# Patient Record
Sex: MALE | Race: WHITE | NOT HISPANIC OR LATINO | Employment: OTHER | URBAN - METROPOLITAN AREA
[De-identification: names, ages, dates, MRNs, and addresses within clinical notes are randomized per-mention and may not be internally consistent; named-entity substitution may affect disease eponyms.]

---

## 2017-05-31 ENCOUNTER — TELEPHONE (OUTPATIENT)
Dept: ORTHOPEDICS | Facility: CLINIC | Age: 72
End: 2017-05-31

## 2017-05-31 NOTE — TELEPHONE ENCOUNTER
----- Message from Anh Nelson sent at 5/31/2017  3:13 PM CDT -----  Contact: self  Patient states it is time for his Orthovisc injections and would like for you to order then call to make appt

## 2017-06-26 ENCOUNTER — OFFICE VISIT (OUTPATIENT)
Dept: ORTHOPEDICS | Facility: CLINIC | Age: 72
End: 2017-06-26
Payer: MEDICARE

## 2017-06-26 DIAGNOSIS — M17.0 PRIMARY OSTEOARTHRITIS OF BOTH KNEES: Primary | ICD-10-CM

## 2017-06-26 PROCEDURE — 99212 OFFICE O/P EST SF 10 MIN: CPT | Mod: PBBFAC,PN,25 | Performed by: ORTHOPAEDIC SURGERY

## 2017-06-26 PROCEDURE — 20610 DRAIN/INJ JOINT/BURSA W/O US: CPT | Mod: 50,PBBFAC,PN | Performed by: ORTHOPAEDIC SURGERY

## 2017-06-26 PROCEDURE — 99499 UNLISTED E&M SERVICE: CPT | Mod: S$PBB,,, | Performed by: ORTHOPAEDIC SURGERY

## 2017-06-26 PROCEDURE — 99999 PR PBB SHADOW E&M-EST. PATIENT-LVL II: CPT | Mod: PBBFAC,,, | Performed by: ORTHOPAEDIC SURGERY

## 2017-06-26 RX ADMIN — Medication 30 MG: at 10:06

## 2017-06-26 NOTE — PROCEDURES
Large Joint Aspiration/Injection  Date/Time: 6/26/2017 10:27 AM  Performed by: YUMIKO UGALDE  Authorized by: YUMIKO UGALDE     Consent Done?:  Yes (Verbal)  Indications:  Pain  Procedure site marked: Yes    Timeout: Prior to procedure the correct patient, procedure, and site was verified      Location:  Knee  Site:  R knee and L knee  Prep: Patient was prepped and draped in usual sterile fashion    Needle size:  20 G  Approach:  Anterolateral  Medications:  30 mg sodium hyaluronate (viscosup) 30 mg/2 mL; 30 mg sodium hyaluronate (viscosup) 30 mg/2 mL  Patient tolerance:  Patient tolerated the procedure well with no immediate complications

## 2017-06-26 NOTE — PROGRESS NOTES
This note was created using Dragon dictation software. The program will occasionally misinterpret certain words or phrases.   Past medical history, surgical history, medications, allergies, family history and social history are all reviewed and in their proper sections.     Chief complaint: Bilateral knee pain    History: This is a 71-year-old male with a long history of bilateral knee pain.  Patient has had periods treatment options in the past.  He had a Synvisc series, but had a pseudo-septic reaction.  He also had an Orthovisc series with good success. He rates the pain as a 0/10.  Left is mildly worse than the right.  Symptoms are worsening and moderate.  Pain is worse with walking running or prolonged sitting    Present:  Here to start a new round of Orthovisc.    Physical examination:   Vital signs are entered in their proper section.   This is a well-developed, well-nourished patient in no acute distress. They are alert and oriented and cooperative to examination. Pt walks without an antalgic gait.     Examination of bilateral knees shows no rashes or erythema. There are no masses ecchymosis or effusion. Patient has full range of motion from 0-140°. Patient is nontender to palpation over lateral joint line and moderately tender to palpation over the medial joint line.  Knee is stable to varus and valgus stress. 5 out of 5 motor strength. Palpable distal pulses. Intact light touch sensation.  Mild Patellofemoral crepitus.  Mild varus knee deformity.    X-rays: 4 views of bilateral knees are reviewed which show severe medial joint space narrowing of the left and moderate to severe medial joint space narrowing on the right with patellofemoral spurring bilaterally    Assessment: Bilateral knee arthritis    Plan: Injected both knees with Orthovisc 1 of 3.  Follow-up in one week to continue.

## 2017-07-06 ENCOUNTER — OFFICE VISIT (OUTPATIENT)
Dept: ORTHOPEDICS | Facility: CLINIC | Age: 72
End: 2017-07-06
Payer: MEDICARE

## 2017-07-06 DIAGNOSIS — M17.0 PRIMARY OSTEOARTHRITIS OF BOTH KNEES: Primary | ICD-10-CM

## 2017-07-06 PROCEDURE — 99212 OFFICE O/P EST SF 10 MIN: CPT | Mod: PBBFAC,PN | Performed by: ORTHOPAEDIC SURGERY

## 2017-07-06 PROCEDURE — 99499 UNLISTED E&M SERVICE: CPT | Mod: S$PBB,,, | Performed by: ORTHOPAEDIC SURGERY

## 2017-07-06 PROCEDURE — 99999 PR PBB SHADOW E&M-EST. PATIENT-LVL II: CPT | Mod: PBBFAC,,, | Performed by: ORTHOPAEDIC SURGERY

## 2017-07-06 PROCEDURE — 20610 DRAIN/INJ JOINT/BURSA W/O US: CPT | Mod: 50,PBBFAC,PN | Performed by: ORTHOPAEDIC SURGERY

## 2017-07-06 RX ADMIN — Medication 30 MG: at 02:07

## 2017-07-06 NOTE — PROCEDURES
Large Joint Aspiration/Injection  Date/Time: 7/6/2017 2:23 PM  Performed by: YUMIKO UGALDE  Authorized by: YUMIKO GUALDE     Consent Done?:  Yes (Verbal)  Indications:  Pain  Procedure site marked: Yes    Timeout: Prior to procedure the correct patient, procedure, and site was verified      Location:  Knee  Site:  R knee and L knee  Prep: Patient was prepped and draped in usual sterile fashion    Needle size:  20 G  Approach:  Anterolateral  Medications:  30 mg sodium hyaluronate (viscosup) 30 mg/2 mL; 30 mg sodium hyaluronate (viscosup) 30 mg/2 mL  Patient tolerance:  Patient tolerated the procedure well with no immediate complications

## 2017-07-06 NOTE — PROGRESS NOTES
This note was created using Dragon dictation software. The program will occasionally misinterpret certain words or phrases.   Past medical history, surgical history, medications, allergies, family history and social history are all reviewed and in their proper sections.     Chief complaint: Bilateral knee pain    History: This is a 71-year-old male with a long history of bilateral knee pain.  Patient has had periods treatment options in the past.  He had a Synvisc series, but had a pseudo-septic reaction.  He also had an Orthovisc series with good success. He rates the pain as a 0/10.  Left is mildly worse than the right.  Symptoms are worsening and moderate.  Pain is worse with walking running or prolonged sitting    Present:  Here to get Orthovisc.    Physical examination:   Vital signs are entered in their proper section.   This is a well-developed, well-nourished patient in no acute distress. They are alert and oriented and cooperative to examination. Pt walks without an antalgic gait.     Examination of bilateral knees shows no rashes or erythema. There are no masses ecchymosis or effusion. Patient has full range of motion from 0-140°. Patient is nontender to palpation over lateral joint line and moderately tender to palpation over the medial joint line.  Knee is stable to varus and valgus stress. 5 out of 5 motor strength. Palpable distal pulses. Intact light touch sensation.  Mild Patellofemoral crepitus.  Mild varus knee deformity.    X-rays: 4 views of bilateral knees are reviewed which show severe medial joint space narrowing of the left and moderate to severe medial joint space narrowing on the right with patellofemoral spurring bilaterally    Assessment: Bilateral knee arthritis    Plan: Injected both knees with Orthovisc 2 of 3.  Follow-up in one week to continue.

## 2017-07-10 ENCOUNTER — TELEPHONE (OUTPATIENT)
Dept: ORTHOPEDICS | Facility: CLINIC | Age: 72
End: 2017-07-10

## 2017-07-10 NOTE — TELEPHONE ENCOUNTER
----- Message from Roxanna Chandler sent at 7/10/2017  8:11 AM CDT -----  Contact: Manpreet  Patient needs to reschedule injection today. His pump is out on his steering wheel. Would like to move to Thursday July 13 around 3 pm. Call cell [phone] thanks!

## 2017-07-13 ENCOUNTER — OFFICE VISIT (OUTPATIENT)
Dept: ORTHOPEDICS | Facility: CLINIC | Age: 72
End: 2017-07-13
Payer: MEDICARE

## 2017-07-13 DIAGNOSIS — M17.0 PRIMARY OSTEOARTHRITIS OF BOTH KNEES: Primary | ICD-10-CM

## 2017-07-13 PROCEDURE — 99499 UNLISTED E&M SERVICE: CPT | Mod: S$PBB,,, | Performed by: ORTHOPAEDIC SURGERY

## 2017-07-13 PROCEDURE — 99999 PR PBB SHADOW E&M-EST. PATIENT-LVL II: CPT | Mod: PBBFAC,,, | Performed by: ORTHOPAEDIC SURGERY

## 2017-07-13 PROCEDURE — 20610 DRAIN/INJ JOINT/BURSA W/O US: CPT | Mod: 50,PBBFAC,PN | Performed by: ORTHOPAEDIC SURGERY

## 2017-07-13 PROCEDURE — 99212 OFFICE O/P EST SF 10 MIN: CPT | Mod: PBBFAC,PN,25 | Performed by: ORTHOPAEDIC SURGERY

## 2017-07-13 RX ADMIN — Medication 30 MG: at 03:07

## 2017-07-15 NOTE — PROCEDURES
Large Joint Aspiration/Injection  Date/Time: 7/13/2017 3:30 PM  Performed by: YUMIKO UGALDE  Authorized by: YUMIKO UGALDE     Consent Done?:  Yes (Verbal)  Indications:  Pain  Procedure site marked: Yes    Timeout: Prior to procedure the correct patient, procedure, and site was verified      Location:  Knee  Site:  R knee and L knee  Prep: Patient was prepped and draped in usual sterile fashion    Needle size:  20 G  Approach:  Anterolateral  Medications:  30 mg sodium hyaluronate (viscosup) 30 mg/2 mL; 30 mg sodium hyaluronate (viscosup) 30 mg/2 mL  Patient tolerance:  Patient tolerated the procedure well with no immediate complications

## 2017-07-15 NOTE — PROGRESS NOTES
This note was created using Dragon dictation software. The program will occasionally misinterpret certain words or phrases.   Past medical history, surgical history, medications, allergies, family history and social history are all reviewed and in their proper sections.     Chief complaint: Bilateral knee pain    History: This is a 71-year-old male with a long history of bilateral knee pain.  Patient has had periods treatment options in the past.  He had a Synvisc series, but had a pseudo-septic reaction.  He also had an Orthovisc series with good success. He rates the pain as a 0/10.  Left is mildly worse than the right.  Symptoms are worsening and moderate.  Pain is worse with walking running or prolonged sitting    Present:  Here to get Orthovisc.    Physical examination:   Vital signs are entered in their proper section.   This is a well-developed, well-nourished patient in no acute distress. They are alert and oriented and cooperative to examination. Pt walks without an antalgic gait.     Examination of bilateral knees shows no rashes or erythema. There are no masses ecchymosis or effusion. Patient has full range of motion from 0-140°. Patient is nontender to palpation over lateral joint line and moderately tender to palpation over the medial joint line.  Knee is stable to varus and valgus stress. 5 out of 5 motor strength. Palpable distal pulses. Intact light touch sensation.  Mild Patellofemoral crepitus.  Mild varus knee deformity.    X-rays: 4 views of bilateral knees are reviewed which show severe medial joint space narrowing of the left and moderate to severe medial joint space narrowing on the right with patellofemoral spurring bilaterally    Assessment: Bilateral knee arthritis    Plan: Injected both knees with Orthovisc 3 of 3.  Follow-up in 6 months.

## 2018-01-08 ENCOUNTER — TELEPHONE (OUTPATIENT)
Dept: ORTHOPEDICS | Facility: CLINIC | Age: 73
End: 2018-01-08

## 2018-01-08 NOTE — TELEPHONE ENCOUNTER
Called and advised patient that I will get the Orthovisc ordered for him and call him once received. He verbalized understanding.

## 2018-01-08 NOTE — TELEPHONE ENCOUNTER
----- Message from Shayy Mclain sent at 1/8/2018  4:13 PM CST -----  Contact: Self/[phone]  Pt is requesting a call back re: ordering the Orthovisc.

## 2018-01-29 ENCOUNTER — OFFICE VISIT (OUTPATIENT)
Dept: ORTHOPEDICS | Facility: CLINIC | Age: 73
End: 2018-01-29
Payer: MEDICARE

## 2018-01-29 DIAGNOSIS — M17.0 PRIMARY OSTEOARTHRITIS OF BOTH KNEES: Primary | ICD-10-CM

## 2018-01-29 PROCEDURE — 99499 UNLISTED E&M SERVICE: CPT | Mod: S$PBB,,, | Performed by: ORTHOPAEDIC SURGERY

## 2018-01-29 PROCEDURE — 99999 PR PBB SHADOW E&M-EST. PATIENT-LVL II: CPT | Mod: PBBFAC,,, | Performed by: ORTHOPAEDIC SURGERY

## 2018-01-29 PROCEDURE — 20610 DRAIN/INJ JOINT/BURSA W/O US: CPT | Mod: 50,PBBFAC,PN | Performed by: ORTHOPAEDIC SURGERY

## 2018-01-29 PROCEDURE — 99212 OFFICE O/P EST SF 10 MIN: CPT | Mod: PBBFAC,PN | Performed by: ORTHOPAEDIC SURGERY

## 2018-01-29 RX ADMIN — Medication 30 MG: at 09:01

## 2018-01-29 NOTE — PROCEDURES
Large Joint Aspiration/Injection  Date/Time: 1/29/2018 9:06 AM  Performed by: YUMIKO UGALDE  Authorized by: YUMIKO UGALDE     Consent Done?:  Yes (Verbal)  Indications:  Pain  Procedure site marked: Yes    Timeout: Prior to procedure the correct patient, procedure, and site was verified      Location:  Knee  Site:  R knee and L knee  Prep: Patient was prepped and draped in usual sterile fashion    Needle size:  20 G  Approach:  Anterolateral  Medications:  30 mg sodium hyaluronate (viscosup) 30 mg/2 mL; 30 mg sodium hyaluronate (viscosup) 30 mg/2 mL  Patient tolerance:  Patient tolerated the procedure well with no immediate complications

## 2018-01-29 NOTE — PROGRESS NOTES
This note was created using Dragon dictation software. The program will occasionally misinterpret certain words or phrases.   Past medical history, surgical history, medications, allergies, family history and social history are all reviewed and in their proper sections.     Chief complaint: Bilateral knee pain    History: This is a 71-year-old male with a long history of bilateral knee pain.  Patient has had periods treatment options in the past.  He had a Synvisc series, but had a pseudo-septic reaction.  He also had an Orthovisc series with good success. He rates the pain as a 0/10.  Left is mildly worse than the right.  Symptoms are worsening and moderate.  Pain is worse with walking running or prolonged sitting    Present:  Here to get Orthovisc.    Physical examination:   Vital signs are entered in their proper section.   This is a well-developed, well-nourished patient in no acute distress. They are alert and oriented and cooperative to examination. Pt walks without an antalgic gait.     Examination of bilateral knees shows no rashes or erythema. There are no masses ecchymosis or effusion. Patient has full range of motion from 0-140°. Patient is nontender to palpation over lateral joint line and moderately tender to palpation over the medial joint line.  Knee is stable to varus and valgus stress. 5 out of 5 motor strength. Palpable distal pulses. Intact light touch sensation.  Mild Patellofemoral crepitus.  Mild varus knee deformity.    X-rays: 4 views of bilateral knees are reviewed which show severe medial joint space narrowing of the left and moderate to severe medial joint space narrowing on the right with patellofemoral spurring bilaterally    Assessment: Bilateral knee arthritis    Plan: Injected both knees with Orthovisc 1 of 3.  Follow-up in one week.

## 2018-02-05 ENCOUNTER — OFFICE VISIT (OUTPATIENT)
Dept: ORTHOPEDICS | Facility: CLINIC | Age: 73
End: 2018-02-05
Payer: MEDICARE

## 2018-02-05 DIAGNOSIS — M17.0 PRIMARY OSTEOARTHRITIS OF BOTH KNEES: Primary | ICD-10-CM

## 2018-02-05 PROCEDURE — 99212 OFFICE O/P EST SF 10 MIN: CPT | Mod: PBBFAC,PN,25 | Performed by: ORTHOPAEDIC SURGERY

## 2018-02-05 PROCEDURE — 99499 UNLISTED E&M SERVICE: CPT | Mod: S$PBB,,, | Performed by: ORTHOPAEDIC SURGERY

## 2018-02-05 PROCEDURE — 20610 DRAIN/INJ JOINT/BURSA W/O US: CPT | Mod: 50,PBBFAC,PN | Performed by: ORTHOPAEDIC SURGERY

## 2018-02-05 PROCEDURE — 99999 PR PBB SHADOW E&M-EST. PATIENT-LVL II: CPT | Mod: PBBFAC,,, | Performed by: ORTHOPAEDIC SURGERY

## 2018-02-05 RX ADMIN — Medication 30 MG: at 12:02

## 2018-02-05 NOTE — PROCEDURES
Large Joint Aspiration/Injection  Date/Time: 2/5/2018 12:15 PM  Performed by: YUMIKO UGALDE  Authorized by: YUMIKO UGALDE     Consent Done?:  Yes (Verbal)  Indications:  Pain  Procedure site marked: Yes    Timeout: Prior to procedure the correct patient, procedure, and site was verified      Location:  Knee  Site:  R knee and L knee  Prep: Patient was prepped and draped in usual sterile fashion    Needle size:  20 G  Approach:  Anterolateral  Medications:  30 mg sodium hyaluronate (viscosup) 30 mg/2 mL; 30 mg sodium hyaluronate (viscosup) 30 mg/2 mL  Patient tolerance:  Patient tolerated the procedure well with no immediate complications

## 2018-02-12 ENCOUNTER — OFFICE VISIT (OUTPATIENT)
Dept: ORTHOPEDICS | Facility: CLINIC | Age: 73
End: 2018-02-12
Payer: MEDICARE

## 2018-02-12 DIAGNOSIS — M17.0 PRIMARY OSTEOARTHRITIS OF BOTH KNEES: Primary | ICD-10-CM

## 2018-02-12 PROCEDURE — 99499 UNLISTED E&M SERVICE: CPT | Mod: S$PBB,,, | Performed by: ORTHOPAEDIC SURGERY

## 2018-02-12 PROCEDURE — 99999 PR PBB SHADOW E&M-EST. PATIENT-LVL II: CPT | Mod: PBBFAC,,, | Performed by: ORTHOPAEDIC SURGERY

## 2018-02-12 PROCEDURE — 20610 DRAIN/INJ JOINT/BURSA W/O US: CPT | Mod: 50,PBBFAC,PN | Performed by: ORTHOPAEDIC SURGERY

## 2018-02-12 PROCEDURE — 99212 OFFICE O/P EST SF 10 MIN: CPT | Mod: PBBFAC,PN | Performed by: ORTHOPAEDIC SURGERY

## 2018-02-12 RX ADMIN — Medication 30 MG: at 08:02

## 2018-02-12 NOTE — PROGRESS NOTES
This note was created using Dragon dictation software. The program will occasionally misinterpret certain words or phrases.   Past medical history, surgical history, medications, allergies, family history and social history are all reviewed and in their proper sections.     Chief complaint: Bilateral knee pain    History: This is a 72-year-old male with a long history of bilateral knee pain.  Patient has had periods treatment options in the past.  He had a Synvisc series, but had a pseudo-septic reaction.  He also had an Orthovisc series with good success. He rates the pain as a 0/10.  Left is mildly worse than the right.  Symptoms are worsening and moderate.  Pain is worse with walking running or prolonged sitting    Present:  Here to get Orthovisc.    Physical examination:   Vital signs are entered in their proper section.   This is a well-developed, well-nourished patient in no acute distress. They are alert and oriented and cooperative to examination. Pt walks without an antalgic gait.     Examination of bilateral knees shows no rashes or erythema. There are no masses ecchymosis or effusion. Patient has full range of motion from 0-140°. Patient is nontender to palpation over lateral joint line and moderately tender to palpation over the medial joint line.  Knee is stable to varus and valgus stress. 5 out of 5 motor strength. Palpable distal pulses. Intact light touch sensation.  Mild Patellofemoral crepitus.  Mild varus knee deformity.    X-rays: 4 views of bilateral knees are reviewed which show severe medial joint space narrowing of the left and moderate to severe medial joint space narrowing on the right with patellofemoral spurring bilaterally    Assessment: Bilateral knee arthritis    Plan: Injected both knees with Orthovisc 3  of 3.  Follow-up in 6 months

## 2018-02-12 NOTE — PROCEDURES
Large Joint Aspiration/Injection  Date/Time: 2/12/2018 8:44 AM  Performed by: YUMIKO UGALDE  Authorized by: YUMIKO UGALDE     Consent Done?:  Yes (Verbal)  Indications:  Pain  Procedure site marked: Yes    Timeout: Prior to procedure the correct patient, procedure, and site was verified      Location:  Knee  Site:  L knee and R knee  Prep: Patient was prepped and draped in usual sterile fashion    Needle size:  20 G  Approach:  Anterolateral  Medications:  30 mg sodium hyaluronate (viscosup) 30 mg/2 mL; 30 mg sodium hyaluronate (viscosup) 30 mg/2 mL  Patient tolerance:  Patient tolerated the procedure well with no immediate complications

## 2018-07-20 ENCOUNTER — TELEPHONE (OUTPATIENT)
Dept: ORTHOPEDICS | Facility: CLINIC | Age: 73
End: 2018-07-20

## 2018-07-20 NOTE — TELEPHONE ENCOUNTER
Called pt and advised we do have injections in clinic ready for his appt on 8/13/18. Pt verbalized understanding.

## 2018-07-20 NOTE — TELEPHONE ENCOUNTER
----- Message from Rose Donnelly MA sent at 7/20/2018 11:53 AM CDT -----  Contact: Self  Patient wants to make sure his orthovisc is ordered for his August 13th appointment

## 2018-07-24 DIAGNOSIS — M17.0 BILATERAL PRIMARY OSTEOARTHRITIS OF KNEE: Primary | ICD-10-CM

## 2018-08-13 ENCOUNTER — OFFICE VISIT (OUTPATIENT)
Dept: ORTHOPEDICS | Facility: CLINIC | Age: 73
End: 2018-08-13
Payer: MEDICARE

## 2018-08-13 DIAGNOSIS — M17.0 PRIMARY OSTEOARTHRITIS OF BOTH KNEES: Primary | ICD-10-CM

## 2018-08-13 PROCEDURE — 99999 PR PBB SHADOW E&M-EST. PATIENT-LVL III: CPT | Mod: PBBFAC,,, | Performed by: ORTHOPAEDIC SURGERY

## 2018-08-13 PROCEDURE — 99213 OFFICE O/P EST LOW 20 MIN: CPT | Mod: 25,S$PBB,, | Performed by: ORTHOPAEDIC SURGERY

## 2018-08-13 PROCEDURE — 99213 OFFICE O/P EST LOW 20 MIN: CPT | Mod: PBBFAC,PN,25 | Performed by: ORTHOPAEDIC SURGERY

## 2018-08-13 PROCEDURE — 20610 DRAIN/INJ JOINT/BURSA W/O US: CPT | Mod: 50,PBBFAC,PN | Performed by: ORTHOPAEDIC SURGERY

## 2018-08-13 RX ADMIN — Medication 15 MG: at 08:08

## 2018-08-13 NOTE — PROCEDURES
Large Joint Aspiration/Injection: R knee, L knee  Date/Time: 8/13/2018 8:13 AM  Performed by: Max Garcia MD  Authorized by: Max Garcia MD     Consent Done?:  Yes (Verbal)  Indications:  Pain  Procedure site marked: Yes    Timeout: Prior to procedure the correct patient, procedure, and site was verified      Location:  Knee  Site:  R knee and L knee  Prep: Patient was prepped and draped in usual sterile fashion    Needle size:  20 G  Approach:  Anterolateral  Medications:  15 mg sodium hyaluronate (orthovisc) 30 mg/2 mL; 15 mg sodium hyaluronate (orthovisc) 30 mg/2 mL  Patient tolerance:  Patient tolerated the procedure well with no immediate complications

## 2018-08-13 NOTE — PROGRESS NOTES
This note was created using Dragon dictation software. The program will occasionally misinterpret certain words or phrases.   Past medical history, surgical history, medications, allergies, family history and social history are all reviewed and in their proper sections.     Chief complaint: Bilateral knee pain    History: This is a 72-year-old male with a long history of bilateral knee pain.  Patient has had periods treatment options in the past.  He had a Synvisc series, but had a pseudo-septic reaction.  He also had an Orthovisc series with good success. He rates the pain as a 0/10.  Left is mildly worse than the right.  Symptoms are worsening and moderate.  Pain is worse with walking running or prolonged sitting    Present:  Here to get Orthovisc.  Each series seems to lasts less than last.  Only got about 5 months from the last 1.  Rates his pain today is a 2/10.    Physical examination:   Vital signs are entered in their proper section.   This is a well-developed, well-nourished patient in no acute distress. They are alert and oriented and cooperative to examination. Pt walks without an antalgic gait.     Examination of bilateral knees shows no rashes or erythema. There are no masses ecchymosis or effusion. Patient has full range of motion from 0-140°. Patient is nontender to palpation over lateral joint line and moderately tender to palpation over the medial joint line.  Knee is stable to varus and valgus stress. 5 out of 5 motor strength. Palpable distal pulses. Intact light touch sensation.  Mild Patellofemoral crepitus.  Mild varus knee deformity.    X-rays: 4 views of bilateral knees are reviewed which show severe medial joint space narrowing of the left and moderate to severe medial joint space narrowing on the right with patellofemoral spurring bilaterally    Assessment: Bilateral knee arthritis    Plan: Injected both knees with Orthovisc 1  of 3.  Follow-up next week.

## 2018-08-20 ENCOUNTER — OFFICE VISIT (OUTPATIENT)
Dept: ORTHOPEDICS | Facility: CLINIC | Age: 73
End: 2018-08-20
Payer: MEDICARE

## 2018-08-20 VITALS — WEIGHT: 241 LBS | BODY MASS INDEX: 34.5 KG/M2 | HEIGHT: 70 IN

## 2018-08-20 DIAGNOSIS — M17.0 PRIMARY OSTEOARTHRITIS OF BOTH KNEES: Primary | ICD-10-CM

## 2018-08-20 PROCEDURE — 99999 PR PBB SHADOW E&M-EST. PATIENT-LVL II: CPT | Mod: PBBFAC,,, | Performed by: ORTHOPAEDIC SURGERY

## 2018-08-20 PROCEDURE — 99212 OFFICE O/P EST SF 10 MIN: CPT | Mod: PBBFAC,PN | Performed by: ORTHOPAEDIC SURGERY

## 2018-08-20 PROCEDURE — 99499 UNLISTED E&M SERVICE: CPT | Mod: S$PBB,,, | Performed by: ORTHOPAEDIC SURGERY

## 2018-08-20 PROCEDURE — 20610 DRAIN/INJ JOINT/BURSA W/O US: CPT | Mod: 50,PBBFAC,PN | Performed by: ORTHOPAEDIC SURGERY

## 2018-08-20 RX ADMIN — Medication 15 MG: at 10:08

## 2018-08-20 NOTE — PROGRESS NOTES
This note was created using Dragon dictation software. The program will occasionally misinterpret certain words or phrases.   Past medical history, surgical history, medications, allergies, family history and social history are all reviewed and in their proper sections.     Chief complaint: Bilateral knee pain    History: This is a 72-year-old male with a long history of bilateral knee pain.  Patient has had periods treatment options in the past.  He had a Synvisc series, but had a pseudo-septic reaction.  He also had an Orthovisc series with good success. He rates the pain as a 0/10.  Left is mildly worse than the right.  Symptoms are worsening and moderate.  Pain is worse with walking running or prolonged sitting    Present:  Here to get Orthovisc.  Each series seems to lasts less than last.  Only got about 5 months from the last 1.  Rates his pain today is a 2/10.    Physical examination:   Vital signs are entered in their proper section.   This is a well-developed, well-nourished patient in no acute distress. They are alert and oriented and cooperative to examination. Pt walks without an antalgic gait.     Examination of bilateral knees shows no rashes or erythema. There are no masses ecchymosis or effusion. Patient has full range of motion from 0-140°. Patient is nontender to palpation over lateral joint line and moderately tender to palpation over the medial joint line.  Knee is stable to varus and valgus stress. 5 out of 5 motor strength. Palpable distal pulses. Intact light touch sensation.  Mild Patellofemoral crepitus.  Mild varus knee deformity.    X-rays: 4 views of bilateral knees are reviewed which show severe medial joint space narrowing of the left and moderate to severe medial joint space narrowing on the right with patellofemoral spurring bilaterally    Assessment: Bilateral knee arthritis    Plan: Injected both knees with Orthovisc 2  of 3.  Follow-up next week.

## 2018-08-20 NOTE — PROCEDURES
Large Joint Aspiration/Injection: R knee, L knee  Date/Time: 8/20/2018 10:33 AM  Performed by: Max Garcia MD  Authorized by: Max Garcia MD     Consent Done?:  Yes (Verbal)  Indications:  Pain  Procedure site marked: Yes    Timeout: Prior to procedure the correct patient, procedure, and site was verified      Location:  Knee  Site:  R knee and L knee  Prep: Patient was prepped and draped in usual sterile fashion    Needle size:  20 G  Approach:  Anterolateral  Medications:  15 mg sodium hyaluronate (orthovisc) 30 mg/2 mL; 15 mg sodium hyaluronate (orthovisc) 30 mg/2 mL  Patient tolerance:  Patient tolerated the procedure well with no immediate complications

## 2018-08-27 ENCOUNTER — OFFICE VISIT (OUTPATIENT)
Dept: ORTHOPEDICS | Facility: CLINIC | Age: 73
End: 2018-08-27
Payer: MEDICARE

## 2018-08-27 VITALS — WEIGHT: 241 LBS | HEIGHT: 70 IN | BODY MASS INDEX: 34.5 KG/M2

## 2018-08-27 DIAGNOSIS — M17.0 PRIMARY OSTEOARTHRITIS OF BOTH KNEES: Primary | ICD-10-CM

## 2018-08-27 PROCEDURE — 99999 PR PBB SHADOW E&M-EST. PATIENT-LVL II: CPT | Mod: PBBFAC,,, | Performed by: ORTHOPAEDIC SURGERY

## 2018-08-27 PROCEDURE — 20610 DRAIN/INJ JOINT/BURSA W/O US: CPT | Mod: 50,PBBFAC,PN | Performed by: ORTHOPAEDIC SURGERY

## 2018-08-27 PROCEDURE — 99212 OFFICE O/P EST SF 10 MIN: CPT | Mod: PBBFAC,PN | Performed by: ORTHOPAEDIC SURGERY

## 2018-08-27 PROCEDURE — 99499 UNLISTED E&M SERVICE: CPT | Mod: S$PBB,,, | Performed by: ORTHOPAEDIC SURGERY

## 2018-08-27 RX ADMIN — Medication 15 MG: at 09:08

## 2018-08-27 NOTE — PROCEDURES
Large Joint Aspiration/Injection: R knee, L knee  Date/Time: 8/27/2018 9:52 AM  Performed by: Max Garcia MD  Authorized by: Max Garcia MD     Consent Done?:  Yes (Verbal)  Indications:  Pain  Procedure site marked: Yes    Timeout: Prior to procedure the correct patient, procedure, and site was verified      Location:  Knee  Site:  R knee and L knee  Prep: Patient was prepped and draped in usual sterile fashion    Needle size:  20 G  Approach:  Anterolateral  Medications:  15 mg sodium hyaluronate (orthovisc) 30 mg/2 mL; 15 mg sodium hyaluronate (orthovisc) 30 mg/2 mL  Patient tolerance:  Patient tolerated the procedure well with no immediate complications

## 2018-08-27 NOTE — PROGRESS NOTES
This note was created using Dragon dictation software. The program will occasionally misinterpret certain words or phrases.   Past medical history, surgical history, medications, allergies, family history and social history are all reviewed and in their proper sections.     Chief complaint: Bilateral knee pain    History: This is a 72-year-old male with a long history of bilateral knee pain.  Patient has had periods treatment options in the past.  He had a Synvisc series, but had a pseudo-septic reaction.  He also had an Orthovisc series with good success. He rates the pain as a 0/10.  Left is mildly worse than the right.  Symptoms are worsening and moderate.  Pain is worse with walking running or prolonged sitting    Present:  Here to get Orthovisc.  Each series seems to lasts less than last.  Only got about 5 months from the last 1.  Rates his pain today is a 2/10.    Physical examination:   Vital signs are entered in their proper section.   This is a well-developed, well-nourished patient in no acute distress. They are alert and oriented and cooperative to examination. Pt walks without an antalgic gait.     Examination of bilateral knees shows no rashes or erythema. There are no masses ecchymosis or effusion. Patient has full range of motion from 0-140°. Patient is nontender to palpation over lateral joint line and moderately tender to palpation over the medial joint line.  Knee is stable to varus and valgus stress. 5 out of 5 motor strength. Palpable distal pulses. Intact light touch sensation.  Mild Patellofemoral crepitus.  Mild varus knee deformity.    X-rays: 4 views of bilateral knees are reviewed which show severe medial joint space narrowing of the left and moderate to severe medial joint space narrowing on the right with patellofemoral spurring bilaterally    Assessment: Bilateral knee arthritis    Plan: Injected both knees with Orthovisc 3  of 3.  Follow-up as needed.

## 2019-01-31 ENCOUNTER — TELEPHONE (OUTPATIENT)
Dept: ORTHOPEDICS | Facility: CLINIC | Age: 74
End: 2019-01-31

## 2019-01-31 NOTE — TELEPHONE ENCOUNTER
----- Message from Eddi Dial sent at 1/31/2019  9:00 AM CST -----  Type: Needs Medical Advice    Who Called:  Patient    Best Call Back Number: [phone]  Additional Information: Caller states that he would like a callback regarding his Ortho This injection

## 2019-02-22 ENCOUNTER — TELEPHONE (OUTPATIENT)
Dept: ORTHOPEDICS | Facility: CLINIC | Age: 74
End: 2019-02-22

## 2019-02-22 NOTE — TELEPHONE ENCOUNTER
----- Message from Lucrecia Rodrigez MA sent at 2/22/2019  1:36 PM CST -----  Contact: paty   Needs to schedule when we get the injection fluid  Allergic to one kind   Call back

## 2019-02-22 NOTE — TELEPHONE ENCOUNTER
----- Message from Lucrecia Rodrigez MA sent at 2/22/2019  4:13 PM CST -----  Contact: paty   Missed your call   Regarding ordering and scheduling injection soon   Call back

## 2019-02-22 NOTE — TELEPHONE ENCOUNTER
Patient contacted. Informed that medication for injection(s) will be ordered. Please send message through Patient Portal when medication has arrived, as his phone voicemail is not working at this time. Elba Mckinney LPN

## 2019-02-22 NOTE — TELEPHONE ENCOUNTER
Called pt and left message advising we will order gel injections and call to schedule his appointment once medication is received. Advised to return call with any questions.

## 2019-03-13 ENCOUNTER — TELEPHONE (OUTPATIENT)
Dept: ORTHOPEDICS | Facility: CLINIC | Age: 74
End: 2019-03-13

## 2019-03-13 NOTE — TELEPHONE ENCOUNTER
Called pt and scheduled appointment to receive injections in knees per request. Pt verbalized understanding.

## 2019-03-13 NOTE — TELEPHONE ENCOUNTER
----- Message from Lucrecia Rodrigez MA sent at 3/13/2019  9:47 AM CDT -----  Contact: paty   Wants to know if medication for knee injection as come in.   Needs to schedule   Call back

## 2019-03-21 ENCOUNTER — OFFICE VISIT (OUTPATIENT)
Dept: ORTHOPEDICS | Facility: CLINIC | Age: 74
End: 2019-03-21
Payer: MEDICARE

## 2019-03-21 DIAGNOSIS — M17.0 PRIMARY OSTEOARTHRITIS OF BOTH KNEES: Primary | ICD-10-CM

## 2019-03-21 PROCEDURE — 20610 LARGE JOINT ASPIRATION/INJECTION: R KNEE, L KNEE: ICD-10-PCS | Mod: 50,S$PBB,, | Performed by: ORTHOPAEDIC SURGERY

## 2019-03-21 PROCEDURE — 99499 UNLISTED E&M SERVICE: CPT | Mod: S$PBB,,, | Performed by: ORTHOPAEDIC SURGERY

## 2019-03-21 PROCEDURE — 20610 DRAIN/INJ JOINT/BURSA W/O US: CPT | Mod: 50,PBBFAC,PN | Performed by: ORTHOPAEDIC SURGERY

## 2019-03-21 PROCEDURE — 99212 OFFICE O/P EST SF 10 MIN: CPT | Mod: PBBFAC,PN,25 | Performed by: ORTHOPAEDIC SURGERY

## 2019-03-21 PROCEDURE — 99999 PR PBB SHADOW E&M-EST. PATIENT-LVL II: CPT | Mod: PBBFAC,,, | Performed by: ORTHOPAEDIC SURGERY

## 2019-03-21 PROCEDURE — 99999 PR PBB SHADOW E&M-EST. PATIENT-LVL II: ICD-10-PCS | Mod: PBBFAC,,, | Performed by: ORTHOPAEDIC SURGERY

## 2019-03-21 PROCEDURE — 99499 NO LOS: ICD-10-PCS | Mod: S$PBB,,, | Performed by: ORTHOPAEDIC SURGERY

## 2019-03-21 RX ADMIN — Medication 30 MG: at 09:03

## 2019-03-21 NOTE — PROGRESS NOTES
Past Medical History:   Diagnosis Date    Environmental allergies     Hypertension        Past Surgical History:   Procedure Laterality Date    HERNIA REPAIR      INGUNAL     VASECTOMY      WISDOM TOOTH EXTRACTION         Current Outpatient Medications   Medication Sig    azelastine (ASTELIN) 137 mcg nasal spray 2 sprays (274 mcg total) by Nasal route 2 (two) times daily.    fluocinolone (DERMA-SMOOTHE) 0.01 % external oil Apply topically 3 (three) times daily. Apply to damp scalp at bedtime, wash off qam, avoid chronic use.    montelukast (SINGULAIR) 10 mg tablet TAKE 1 TABLET DAILY    testosterone (ANDROGEL) 1 % (50 mg/5 gram) GlPk Apply topically once daily.    triamcinolone (NASACORT) 55 mcg nasal inhaler 2 sprays by Nasal route once daily.    triamcinolone acetonide 0.1% (KENALOG) 0.1 % cream aaa bid x 2 weeks then prn, avoid chronic use    VIAGRA 100 mg tablet TAKE 1 TABLET AS NEEDED FOR ERECTILE DYSFUNCTION    losartan-hydrochlorothiazide 100-12.5 mg (HYZAAR) 100-12.5 mg Tab Take 1 tablet by mouth once daily.     No current facility-administered medications for this visit.        Review of patient's allergies indicates:   Allergen Reactions    Codeine Hives    Morphine Hives       Family History   Problem Relation Age of Onset    Dementia Mother     Cancer Father         skin    Heart disease Father     Allergic rhinitis Neg Hx     Allergies Neg Hx     Angioedema Neg Hx     Asthma Neg Hx     Atopy Neg Hx     Eczema Neg Hx     Immunodeficiency Neg Hx     Rhinitis Neg Hx     Urticaria Neg Hx        Social History     Socioeconomic History    Marital status:      Spouse name: Not on file    Number of children: Not on file    Years of education: Not on file    Highest education level: Not on file   Social Needs    Financial resource strain: Not on file    Food insecurity - worry: Not on file    Food insecurity - inability: Not on file    Transportation needs - medical:  Not on file    Transportation needs - non-medical: Not on file   Occupational History    Not on file   Tobacco Use    Smoking status: Former Smoker     Packs/day: 1.00     Years: 10.00     Pack years: 10.00     Last attempt to quit: 4/3/1974     Years since quittin.9    Smokeless tobacco: Never Used   Substance and Sexual Activity    Alcohol use: No    Drug use: No    Sexual activity: Not on file   Other Topics Concern    Not on file   Social History Narrative    Not on file       Chief Complaint:   Chief Complaint   Patient presents with    Knee Pain     bilateral knee-orthovisc 1/3        History of present illness:  73-year-old male with a chronic bilateral knee arthritis.  He has tried cortisone injections previously.  He has had hyaluronic acid series in the past.  Patient has had a reaction with both Synvisc and Euflexxa.  We have been doing Orthovisc on him for a few years now with good success.  Knee pain has returned.  Last series was 6 months ago.      Review of Systems:    Constitution: Negative for chills, fever, and sweats.  Negative for unexplained weight loss.    HENT:  Negative for headaches and blurry vision.    Cardiovascular:Negative for chest pain or irregular heart beat. Negative for hypertension.    Respiratory:  Negative for cough and shortness of breath.    Gastrointestinal: Negative for abdominal pain, heartburn, melena, nausea, and vomitting.    Genitourinary:  Negative bladder incontinence and dysuria.    Musculoskeletal:  See HPI    Neurological: Negative for numbness.    Psychiatric/Behavioral: Negative for depression.  The patient is not nervous/anxious.      Endocrine: Negative for polyuria    Hematologic/Lymphatic: Negative for bleeding problem.  Does not bruise/bleed easily.    Skin: Negative for poor would healing and rash      Physical Examination:    Vital Signs:  There were no vitals filed for this visit.    There is no height or weight on file to calculate  BMI.    This a well-developed, well nourished patient in no acute distress.  They are alert and oriented and cooperative to examination.  Pt. walks without an antalgic gait.      Examination of bilateral knees shows no rashes or erythema. There are no masses ecchymosis or effusion. Patient has full range of motion from 0-130°. Patient is nontender to palpation over lateral joint line and nontender to palpation over the medial joint line. Patient has a - Lachman exam, - anterior drawer exam, and - posterior drawer exam. - Juliana's exam. Knee is stable to varus and valgus stress. 5 out of 5 motor strength. Palpable distal pulses. Intact light touch sensation. Negative Patellofemoral crepitus      X-rays:  None today     Assessment::  Bilateral knee arthritis    Plan:  I injected both knees with Orthovisc 1 of 3.  Follow up next week.    This note was created using [employer] voice recognition software that occasionally misinterpreted phrases or words.    Consult note is delivered via Epic messaging service.

## 2019-03-21 NOTE — PROCEDURES
Large Joint Aspiration/Injection: R knee, L knee  Date/Time: 3/21/2019 9:06 AM  Performed by: Max Garcia MD  Authorized by: Max Garcia MD     Consent Done?:  Yes (Verbal)  Indications:  Pain  Procedure site marked: Yes    Timeout: Prior to procedure the correct patient, procedure, and site was verified      Location:  Knee  Site:  R knee and L knee  Prep: Patient was prepped and draped in usual sterile fashion    Needle size:  20 G  Approach:  Anterolateral  Medications:  30 mg sodium hyaluronate (orthovisc) 30 mg/2 mL; 30 mg sodium hyaluronate (orthovisc) 30 mg/2 mL  Patient tolerance:  Patient tolerated the procedure well with no immediate complications

## 2019-04-04 ENCOUNTER — OFFICE VISIT (OUTPATIENT)
Dept: ORTHOPEDICS | Facility: CLINIC | Age: 74
End: 2019-04-04
Payer: MEDICARE

## 2019-04-04 DIAGNOSIS — M17.0 PRIMARY OSTEOARTHRITIS OF BOTH KNEES: Primary | ICD-10-CM

## 2019-04-04 PROCEDURE — 99499 NO LOS: ICD-10-PCS | Mod: S$PBB,,, | Performed by: ORTHOPAEDIC SURGERY

## 2019-04-04 PROCEDURE — 20610 LARGE JOINT ASPIRATION/INJECTION: L KNEE, R KNEE: ICD-10-PCS | Mod: 50,S$PBB,, | Performed by: ORTHOPAEDIC SURGERY

## 2019-04-04 PROCEDURE — 99999 PR PBB SHADOW E&M-EST. PATIENT-LVL II: CPT | Mod: PBBFAC,,, | Performed by: ORTHOPAEDIC SURGERY

## 2019-04-04 PROCEDURE — 20610 DRAIN/INJ JOINT/BURSA W/O US: CPT | Mod: 50,PBBFAC,PN | Performed by: ORTHOPAEDIC SURGERY

## 2019-04-04 PROCEDURE — 99212 OFFICE O/P EST SF 10 MIN: CPT | Mod: PBBFAC,PN,25 | Performed by: ORTHOPAEDIC SURGERY

## 2019-04-04 PROCEDURE — 99499 UNLISTED E&M SERVICE: CPT | Mod: S$PBB,,, | Performed by: ORTHOPAEDIC SURGERY

## 2019-04-04 PROCEDURE — 99999 PR PBB SHADOW E&M-EST. PATIENT-LVL II: ICD-10-PCS | Mod: PBBFAC,,, | Performed by: ORTHOPAEDIC SURGERY

## 2019-04-04 RX ADMIN — Medication 30 MG: at 09:04

## 2019-04-04 NOTE — PROCEDURES
Large Joint Aspiration/Injection: L knee, R knee  Date/Time: 4/4/2019 9:18 AM  Performed by: Max Garcia MD  Authorized by: Max Garcia MD     Consent Done?:  Yes (Verbal)  Indications:  Pain  Procedure site marked: Yes    Timeout: Prior to procedure the correct patient, procedure, and site was verified      Location:  Knee  Site:  L knee and R knee  Prep: Patient was prepped and draped in usual sterile fashion    Needle size:  20 G  Approach:  Anterolateral  Medications:  30 mg sodium hyaluronate (orthovisc) 30 mg/2 mL; 30 mg sodium hyaluronate (orthovisc) 30 mg/2 mL  Patient tolerance:  Patient tolerated the procedure well with no immediate complications

## 2019-04-04 NOTE — PROGRESS NOTES
Past Medical History:   Diagnosis Date    Environmental allergies     Hypertension        Past Surgical History:   Procedure Laterality Date    HERNIA REPAIR      INGUNAL     VASECTOMY      WISDOM TOOTH EXTRACTION         Current Outpatient Medications   Medication Sig    azelastine (ASTELIN) 137 mcg nasal spray 2 sprays (274 mcg total) by Nasal route 2 (two) times daily.    fluocinolone (DERMA-SMOOTHE) 0.01 % external oil Apply topically 3 (three) times daily. Apply to damp scalp at bedtime, wash off qam, avoid chronic use.    montelukast (SINGULAIR) 10 mg tablet TAKE 1 TABLET DAILY    testosterone (ANDROGEL) 1 % (50 mg/5 gram) GlPk Apply topically once daily.    triamcinolone (NASACORT) 55 mcg nasal inhaler 2 sprays by Nasal route once daily.    triamcinolone acetonide 0.1% (KENALOG) 0.1 % cream aaa bid x 2 weeks then prn, avoid chronic use    VIAGRA 100 mg tablet TAKE 1 TABLET AS NEEDED FOR ERECTILE DYSFUNCTION    losartan-hydrochlorothiazide 100-12.5 mg (HYZAAR) 100-12.5 mg Tab Take 1 tablet by mouth once daily.     No current facility-administered medications for this visit.        Review of patient's allergies indicates:   Allergen Reactions    Codeine Hives    Morphine Hives       Family History   Problem Relation Age of Onset    Dementia Mother     Cancer Father         skin    Heart disease Father     Allergic rhinitis Neg Hx     Allergies Neg Hx     Angioedema Neg Hx     Asthma Neg Hx     Atopy Neg Hx     Eczema Neg Hx     Immunodeficiency Neg Hx     Rhinitis Neg Hx     Urticaria Neg Hx        Social History     Socioeconomic History    Marital status:      Spouse name: Not on file    Number of children: Not on file    Years of education: Not on file    Highest education level: Not on file   Occupational History    Not on file   Social Needs    Financial resource strain: Not on file    Food insecurity:     Worry: Not on file     Inability: Not on file     Transportation needs:     Medical: Not on file     Non-medical: Not on file   Tobacco Use    Smoking status: Former Smoker     Packs/day: 1.00     Years: 10.00     Pack years: 10.00     Last attempt to quit: 4/3/1974     Years since quittin.0    Smokeless tobacco: Never Used   Substance and Sexual Activity    Alcohol use: No    Drug use: No    Sexual activity: Not on file   Lifestyle    Physical activity:     Days per week: Not on file     Minutes per session: Not on file    Stress: Not on file   Relationships    Social connections:     Talks on phone: Not on file     Gets together: Not on file     Attends Judaism service: Not on file     Active member of club or organization: Not on file     Attends meetings of clubs or organizations: Not on file     Relationship status: Not on file   Other Topics Concern    Not on file   Social History Narrative    Not on file       Chief Complaint:   Chief Complaint   Patient presents with    Knee Pain     bilateral knee-orthovisc 2/3        History of present illness:  73-year-old male with a chronic bilateral knee arthritis.  He has tried cortisone injections previously.  He has had hyaluronic acid series in the past.  Patient has had a reaction with both Synvisc and Euflexxa.  We have been doing Orthovisc on him for a few years now with good success.  Knee pain has returned.  Last series was 6 months ago.      Review of Systems:    Constitution: Negative for chills, fever, and sweats.  Negative for unexplained weight loss.    HENT:  Negative for headaches and blurry vision.    Cardiovascular:Negative for chest pain or irregular heart beat. Negative for hypertension.    Respiratory:  Negative for cough and shortness of breath.    Gastrointestinal: Negative for abdominal pain, heartburn, melena, nausea, and vomitting.    Genitourinary:  Negative bladder incontinence and dysuria.    Musculoskeletal:  See HPI    Neurological: Negative for  numbness.    Psychiatric/Behavioral: Negative for depression.  The patient is not nervous/anxious.      Endocrine: Negative for polyuria    Hematologic/Lymphatic: Negative for bleeding problem.  Does not bruise/bleed easily.    Skin: Negative for poor would healing and rash      Physical Examination:    Vital Signs:  There were no vitals filed for this visit.    There is no height or weight on file to calculate BMI.    This a well-developed, well nourished patient in no acute distress.  They are alert and oriented and cooperative to examination.  Pt. walks without an antalgic gait.      Examination of bilateral knees shows no rashes or erythema. There are no masses ecchymosis or effusion. Patient has full range of motion from 0-130°. Patient is nontender to palpation over lateral joint line and nontender to palpation over the medial joint line. Patient has a - Lachman exam, - anterior drawer exam, and - posterior drawer exam. - Juliana's exam. Knee is stable to varus and valgus stress. 5 out of 5 motor strength. Palpable distal pulses. Intact light touch sensation. Negative Patellofemoral crepitus      X-rays:  None today     Assessment::  Bilateral knee arthritis    Plan:  I injected both knees with Orthovisc 2 of 3.  Follow up next week.    This note was created using [employer] voice recognition software that occasionally misinterpreted phrases or words.    Consult note is delivered via Epic messaging service.

## 2019-04-11 ENCOUNTER — OFFICE VISIT (OUTPATIENT)
Dept: ORTHOPEDICS | Facility: CLINIC | Age: 74
End: 2019-04-11
Payer: MEDICARE

## 2019-04-11 DIAGNOSIS — M17.0 PRIMARY OSTEOARTHRITIS OF BOTH KNEES: Primary | ICD-10-CM

## 2019-04-11 PROCEDURE — 99999 PR PBB SHADOW E&M-EST. PATIENT-LVL II: ICD-10-PCS | Mod: PBBFAC,,, | Performed by: ORTHOPAEDIC SURGERY

## 2019-04-11 PROCEDURE — 20610 DRAIN/INJ JOINT/BURSA W/O US: CPT | Mod: 50,PBBFAC,PN | Performed by: ORTHOPAEDIC SURGERY

## 2019-04-11 PROCEDURE — 20610 LARGE JOINT ASPIRATION/INJECTION: R KNEE, L KNEE: ICD-10-PCS | Mod: 50,S$PBB,, | Performed by: ORTHOPAEDIC SURGERY

## 2019-04-11 PROCEDURE — 99499 NO LOS: ICD-10-PCS | Mod: S$PBB,,, | Performed by: ORTHOPAEDIC SURGERY

## 2019-04-11 PROCEDURE — 99212 OFFICE O/P EST SF 10 MIN: CPT | Mod: PBBFAC,PN,25 | Performed by: ORTHOPAEDIC SURGERY

## 2019-04-11 PROCEDURE — 99999 PR PBB SHADOW E&M-EST. PATIENT-LVL II: CPT | Mod: PBBFAC,,, | Performed by: ORTHOPAEDIC SURGERY

## 2019-04-11 PROCEDURE — 99499 UNLISTED E&M SERVICE: CPT | Mod: S$PBB,,, | Performed by: ORTHOPAEDIC SURGERY

## 2019-04-11 RX ADMIN — Medication 30 MG: at 11:04

## 2019-04-11 NOTE — PROCEDURES
Large Joint Aspiration/Injection: R knee, L knee  Date/Time: 4/11/2019 11:43 AM  Performed by: Max Garcia MD  Authorized by: Max Garcia MD     Consent Done?:  Yes (Verbal)  Indications:  Pain  Procedure site marked: Yes    Timeout: Prior to procedure the correct patient, procedure, and site was verified      Location:  Knee  Site:  R knee and L knee  Prep: Patient was prepped and draped in usual sterile fashion    Needle size:  20 G  Approach:  Anterolateral  Medications:  30 mg sodium hyaluronate (orthovisc) 30 mg/2 mL; 30 mg sodium hyaluronate (orthovisc) 30 mg/2 mL  Patient tolerance:  Patient tolerated the procedure well with no immediate complications

## 2019-04-11 NOTE — PROGRESS NOTES
Past Medical History:   Diagnosis Date    Environmental allergies     Hypertension        Past Surgical History:   Procedure Laterality Date    HERNIA REPAIR      INGUNAL     VASECTOMY      WISDOM TOOTH EXTRACTION         Current Outpatient Medications   Medication Sig    azelastine (ASTELIN) 137 mcg nasal spray 2 sprays (274 mcg total) by Nasal route 2 (two) times daily.    fluocinolone (DERMA-SMOOTHE) 0.01 % external oil Apply topically 3 (three) times daily. Apply to damp scalp at bedtime, wash off qam, avoid chronic use.    montelukast (SINGULAIR) 10 mg tablet TAKE 1 TABLET DAILY    testosterone (ANDROGEL) 1 % (50 mg/5 gram) GlPk Apply topically once daily.    triamcinolone (NASACORT) 55 mcg nasal inhaler 2 sprays by Nasal route once daily.    triamcinolone acetonide 0.1% (KENALOG) 0.1 % cream aaa bid x 2 weeks then prn, avoid chronic use    VIAGRA 100 mg tablet TAKE 1 TABLET AS NEEDED FOR ERECTILE DYSFUNCTION    losartan-hydrochlorothiazide 100-12.5 mg (HYZAAR) 100-12.5 mg Tab Take 1 tablet by mouth once daily.     No current facility-administered medications for this visit.        Review of patient's allergies indicates:   Allergen Reactions    Codeine Hives    Morphine Hives       Family History   Problem Relation Age of Onset    Dementia Mother     Cancer Father         skin    Heart disease Father     Allergic rhinitis Neg Hx     Allergies Neg Hx     Angioedema Neg Hx     Asthma Neg Hx     Atopy Neg Hx     Eczema Neg Hx     Immunodeficiency Neg Hx     Rhinitis Neg Hx     Urticaria Neg Hx        Social History     Socioeconomic History    Marital status:      Spouse name: Not on file    Number of children: Not on file    Years of education: Not on file    Highest education level: Not on file   Occupational History    Not on file   Social Needs    Financial resource strain: Not on file    Food insecurity:     Worry: Not on file     Inability: Not on file     Transportation needs:     Medical: Not on file     Non-medical: Not on file   Tobacco Use    Smoking status: Former Smoker     Packs/day: 1.00     Years: 10.00     Pack years: 10.00     Last attempt to quit: 4/3/1974     Years since quittin.0    Smokeless tobacco: Never Used   Substance and Sexual Activity    Alcohol use: No    Drug use: No    Sexual activity: Not on file   Lifestyle    Physical activity:     Days per week: Not on file     Minutes per session: Not on file    Stress: Not on file   Relationships    Social connections:     Talks on phone: Not on file     Gets together: Not on file     Attends Mandaeism service: Not on file     Active member of club or organization: Not on file     Attends meetings of clubs or organizations: Not on file     Relationship status: Not on file   Other Topics Concern    Not on file   Social History Narrative    Not on file       Chief Complaint:   Chief Complaint   Patient presents with    Knee Pain     bilateral Orthovisc 3/3        History of present illness:  73-year-old male with a chronic bilateral knee arthritis.  He has tried cortisone injections previously.  He has had hyaluronic acid series in the past.  Patient has had a reaction with both Synvisc and Euflexxa.  We have been doing Orthovisc on him for a few years now with good success.  Knee pain has returned.  Last series was 6 months ago.      Review of Systems:    Constitution: Negative for chills, fever, and sweats.  Negative for unexplained weight loss.    HENT:  Negative for headaches and blurry vision.    Cardiovascular:Negative for chest pain or irregular heart beat. Negative for hypertension.    Respiratory:  Negative for cough and shortness of breath.    Gastrointestinal: Negative for abdominal pain, heartburn, melena, nausea, and vomitting.    Genitourinary:  Negative bladder incontinence and dysuria.    Musculoskeletal:  See HPI    Neurological: Negative for  numbness.    Psychiatric/Behavioral: Negative for depression.  The patient is not nervous/anxious.      Endocrine: Negative for polyuria    Hematologic/Lymphatic: Negative for bleeding problem.  Does not bruise/bleed easily.    Skin: Negative for poor would healing and rash      Physical Examination:    Vital Signs:  There were no vitals filed for this visit.    There is no height or weight on file to calculate BMI.    This a well-developed, well nourished patient in no acute distress.  They are alert and oriented and cooperative to examination.  Pt. walks without an antalgic gait.      Examination of bilateral knees shows no rashes or erythema. There are no masses ecchymosis or effusion. Patient has full range of motion from 0-130°. Patient is nontender to palpation over lateral joint line and nontender to palpation over the medial joint line. Patient has a - Lachman exam, - anterior drawer exam, and - posterior drawer exam. - Juliana's exam. Knee is stable to varus and valgus stress. 5 out of 5 motor strength. Palpable distal pulses. Intact light touch sensation. Negative Patellofemoral crepitus      X-rays:  None today     Assessment::  Bilateral knee arthritis    Plan:  I injected both knees with Orthovisc 3 of 3.  Follow up in 6 months.    This note was created using [employer] voice recognition software that occasionally misinterpreted phrases or words.    Consult note is delivered via Epic messaging service.

## 2019-07-18 ENCOUNTER — CLINICAL SUPPORT (OUTPATIENT)
Dept: UROLOGY | Facility: CLINIC | Age: 74
End: 2019-07-18
Payer: MEDICARE

## 2019-07-18 ENCOUNTER — APPOINTMENT (OUTPATIENT)
Dept: LAB | Facility: HOSPITAL | Age: 74
End: 2019-07-18
Attending: UROLOGY
Payer: MEDICARE

## 2019-07-18 ENCOUNTER — OFFICE VISIT (OUTPATIENT)
Dept: UROLOGY | Facility: CLINIC | Age: 74
End: 2019-07-18
Payer: MEDICARE

## 2019-07-18 VITALS
DIASTOLIC BLOOD PRESSURE: 75 MMHG | HEIGHT: 70 IN | SYSTOLIC BLOOD PRESSURE: 110 MMHG | BODY MASS INDEX: 34.99 KG/M2 | WEIGHT: 244.38 LBS | HEART RATE: 87 BPM

## 2019-07-18 DIAGNOSIS — N39.0 URINARY TRACT INFECTION WITHOUT HEMATURIA, SITE UNSPECIFIED: Primary | ICD-10-CM

## 2019-07-18 DIAGNOSIS — E29.1 HYPOGONADISM IN MALE: ICD-10-CM

## 2019-07-18 DIAGNOSIS — R33.9 URINARY RETENTION: ICD-10-CM

## 2019-07-18 DIAGNOSIS — N40.0 BENIGN PROSTATIC HYPERPLASIA, UNSPECIFIED WHETHER LOWER URINARY TRACT SYMPTOMS PRESENT: ICD-10-CM

## 2019-07-18 DIAGNOSIS — R33.9 URINARY RETENTION: Primary | ICD-10-CM

## 2019-07-18 DIAGNOSIS — N41.1 CHRONIC PROSTATITIS: ICD-10-CM

## 2019-07-18 DIAGNOSIS — Z12.5 ENCOUNTER FOR SCREENING FOR MALIGNANT NEOPLASM OF PROSTATE: ICD-10-CM

## 2019-07-18 LAB
BACTERIA #/AREA URNS HPF: ABNORMAL /HPF
BILIRUB SERPL-MCNC: ABNORMAL MG/DL
BLOOD URINE, POC: ABNORMAL
COLOR, POC UA: YELLOW
GLUCOSE UR QL STRIP: ABNORMAL
KETONES UR QL STRIP: ABNORMAL
LEUKOCYTE ESTERASE URINE, POC: ABNORMAL
MICROSCOPIC COMMENT: ABNORMAL
NITRITE, POC UA: ABNORMAL
PH, POC UA: 6.5
PROTEIN, POC: ABNORMAL
SPECIFIC GRAVITY, POC UA: 1.01
UROBILINOGEN, POC UA: ABNORMAL
WBC #/AREA URNS HPF: 1 /HPF (ref 0–5)

## 2019-07-18 PROCEDURE — 81000 URINALYSIS NONAUTO W/SCOPE: CPT

## 2019-07-18 PROCEDURE — 99205 PR OFFICE/OUTPT VISIT, NEW, LEVL V, 60-74 MIN: ICD-10-PCS | Mod: S$PBB,,, | Performed by: UROLOGY

## 2019-07-18 PROCEDURE — 51741 PR UROFLOWMETRY, COMPLEX: ICD-10-PCS | Mod: 26,S$PBB,, | Performed by: UROLOGY

## 2019-07-18 PROCEDURE — 87086 URINE CULTURE/COLONY COUNT: CPT

## 2019-07-18 PROCEDURE — 51741 ELECTRO-UROFLOWMETRY FIRST: CPT | Mod: 26,S$PBB,, | Performed by: UROLOGY

## 2019-07-18 PROCEDURE — 99215 OFFICE O/P EST HI 40 MIN: CPT | Mod: PBBFAC,25,PN | Performed by: UROLOGY

## 2019-07-18 PROCEDURE — 99205 OFFICE O/P NEW HI 60 MIN: CPT | Mod: S$PBB,,, | Performed by: UROLOGY

## 2019-07-18 PROCEDURE — 99999 PR PBB SHADOW E&M-EST. PATIENT-LVL V: ICD-10-PCS | Mod: PBBFAC,,, | Performed by: UROLOGY

## 2019-07-18 PROCEDURE — 87077 CULTURE AEROBIC IDENTIFY: CPT

## 2019-07-18 PROCEDURE — 51741 ELECTRO-UROFLOWMETRY FIRST: CPT | Mod: PBBFAC,PN | Performed by: UROLOGY

## 2019-07-18 PROCEDURE — 81002 URINALYSIS NONAUTO W/O SCOPE: CPT | Mod: PBBFAC,PN | Performed by: UROLOGY

## 2019-07-18 PROCEDURE — 81001 URINALYSIS AUTO W/SCOPE: CPT | Mod: PBBFAC,PN | Performed by: UROLOGY

## 2019-07-18 PROCEDURE — 87186 SC STD MICRODIL/AGAR DIL: CPT

## 2019-07-18 PROCEDURE — 99999 PR PBB SHADOW E&M-EST. PATIENT-LVL V: CPT | Mod: PBBFAC,,, | Performed by: UROLOGY

## 2019-07-18 PROCEDURE — 87088 URINE BACTERIA CULTURE: CPT

## 2019-07-18 RX ORDER — TADALAFIL 5 MG/1
5 TABLET ORAL DAILY
Qty: 90 TABLET | Refills: 3 | Status: SHIPPED | OUTPATIENT
Start: 2019-07-18 | End: 2019-07-18 | Stop reason: SDUPTHER

## 2019-07-18 RX ORDER — TESTOSTERONE 16.2 MG/G
GEL TRANSDERMAL
COMMUNITY
Start: 2019-05-30 | End: 2024-03-28

## 2019-07-18 RX ORDER — EZETIMIBE 10 MG/1
TABLET ORAL
COMMUNITY
Start: 2019-07-11 | End: 2020-03-09

## 2019-07-18 RX ORDER — FLUTICASONE PROPIONATE 50 MCG
SPRAY, SUSPENSION (ML) NASAL
COMMUNITY
Start: 2019-05-29

## 2019-07-18 RX ORDER — TADALAFIL 5 MG/1
5 TABLET ORAL DAILY
Qty: 90 TABLET | Refills: 3 | Status: SHIPPED | OUTPATIENT
Start: 2019-07-18 | End: 2020-08-06

## 2019-07-18 RX ORDER — GENTAMICIN SULFATE 40 MG/ML
80 INJECTION, SOLUTION INTRAMUSCULAR; INTRAVENOUS
Status: DISCONTINUED | OUTPATIENT
Start: 2019-07-18 | End: 2019-08-08 | Stop reason: CLARIF

## 2019-07-18 RX ORDER — TADALAFIL 5 MG/1
TABLET ORAL
COMMUNITY
Start: 2018-09-01 | End: 2019-07-18

## 2019-07-18 RX ORDER — DICLOFENAC SODIUM 10 MG/G
GEL TOPICAL
COMMUNITY
Start: 2018-08-08

## 2019-07-18 RX ORDER — LIDOCAINE HYDROCHLORIDE 20 MG/ML
JELLY TOPICAL ONCE
Status: CANCELLED | OUTPATIENT
Start: 2019-07-18 | End: 2019-07-18

## 2019-07-18 NOTE — PROGRESS NOTES
Uroflow results (date: 07/18/19) on  :   Voiding time: 14.9s,   Flow time: 12.6s,   TTP flow: 1.0s,   Peak flowrate: 6.2 mL/s,   Average flowrate: 2.9mL/s,   Intervals: 3,    Voided volume: 37 mL,    Pvr by bladder scan: 63 ml.   Pattern of curve: to be determined by physician.

## 2019-07-18 NOTE — PATIENT INSTRUCTIONS
Failed alfuzosin and flomax  Uroflow and pvr today  On testosterone 1.62% 3 pumps a day for ED, which can worsen bph - continue to see pcp for this  Refilled cialis 5mg daily through [employer] online and printed rx   Ok to d/c daily cialis when wanting to use viagra for ED.   H/o malrotated kidney (left)-ctu   Psa, st cr and ctu , ua and culture around July 31  cysto, trus on same day on aug 12.  gent80 mg IM  Send urine for culture today     Mr. Kerr has a long history of BPH, recurrent UTI, recurrent prostatitis and history of urinary retention.  He has had about 6-8 UTIs in the past 2 years alone.  I do not have the results of the cultures but he is symptomatic.  I suspect this is all due to enlarged prostate.  Although his prostate is only 30 g or less on exam he could have some anatomy that prevents him from emptying completely.  He previously tried out few Zosyn and tamsulosin with success but side effects prevented him from being able to take.  He is on Cialis 5 mg daily and states this helps but again continues to have urinary tract infections.  Today his residual is 55. .  I also would like to schedule him for CT urogram.  He has a history of a left malrotated kidney and if it intermittently drained poorly then this could also cause UTIs, however he denies any left flank pain. I would like to schedule him for cystoscopy and transrectal ultrasound in anticipation of up treatment for his prostate.     Today on rectal exam his prostate is only 30 g, but it is boggy although not tender on exam.  Also the right side is firm.  His last PSA was in 2017 and was the same as it has been for many years.  I would like to get a PSA but want to hold off until he finishes his antibiotics as PSA can be elevated if he has active infection.  He will return on July 31st for a PSA a stat creatinine, a CT you, the urinalysis and culture in anticipation of his cystoscopy and transrectal ultrasound which is scheduled for August  12.    If he develops a UTI between July 31st on August 12 he will have to get that checked was in block see and be treated for that prior to his procedure.    In the meantime I am refilling his Cialis 5 mg daily.  He is going to return for a flow study test today.    He is going to continue to have treatment by his primary care for his low testosterone.

## 2019-07-18 NOTE — PROGRESS NOTES
[facility] Note  Staff: MD Karrie  Referring: TONY Meadows    My chart: active    Chief Complaint: Establish Care; Urinary Tract Infection; and Erectile Dysfunction      Subjective:        HPI: Manpreet Kerr is a 73 y.o. male presents with hypogonadism and ED     Initially seen by me in 2015 for low T and elevated PSA, and reports undergoing a prostate biopsy and US in the past with Dr. Romero in [place] in 2008.     He was living in the [facility] for a year and moved back to the  Sept 2013. He was     6/2016 - developed retention. Pvr: 400. had tried flomax in past but caused bad dreams, flu and fatigue however it helped.   3/2018 he developed retention, pvr: 500, 700cc drained. Tried alfuzosin, made him dizzy, however it helped.  Started on cialis 5mg daily for bph and ED and he has improvement on this.  Voiding trial after had pvr 21cc on fill and pull.     Also wants vas reversal. 25 yo wife. [facility] and [facility] success 60%. On list of [facility]otomy at [facility].     On T therapy. Good energy level. Takes extra cialis when he needs to have an erection however viagra works better.     Sx of uti 7/15 and was started on cipro.  6-8 uti's in the last 2 years. Sx include burning, frequency, urethral itching, pressure in bladder, nocturia x2 (normally 1x a night). LN in left hip ache.  Never had a cystoscopy or transrectal ultrasound.       ua void (circ): tr leuk, pvr: 55cc - currently on cipro 3rd day  Urine history: -  10/16/18 No cx, void: neg, pvr: 0 , mycoplasma and urea neg   9/1/18  Multiple org, void: sml leuk, 30-50 wbc  3/8/18  Pvr: 21 (fill a nd pull)   3/2018  Pvr by I&O: 700cc   10/29/17 No cx, void:  Neg  6/2/16  E.coli  6/7/16  Pvr: 11cc  5/31/16 pvr by I&O:  460cc   2/24/15 No cx, void:neg  1/7/15  E.coli res to amp, cipro, lev, tetra, void: neg  10/9/14 No cx, void: neg  4/9/14  No c,x void: neg  3/15/10 Ng, void:  neg  11/6/08 Multiple org    psa  history: MGF - had prostate cancer at a.84  12/15/17 0.458  2016  0.31  2015  0.38  2014  0.5  1/16/12 0.56   1/5/11  0.36  1/6/10  0.30  2008  Negative prostate biopsy     Testosterone history: currently on 1.62% 3 pumps a day  12/15/17 400, psa 0.458 on 1.62% 3 pumps a day   3/26/15 161, 15.1/442.2, psa 0.38 on androgel 2 pumps a day  10/16/14 180  4/11/14 354  2012  androgel 2 pumps a day   1/15/19 177      REVIEW OF SYSTEMS:  General ROS: no fevers, no chills  Psychological ROS: no depression  Endocrine ROS: no heat or cold  Respiratory ROS: no SOB  Cardiovascular ROS: no CP  Gastrointestinal ROS: no abdominal pain, no constipation, no diarrhea, no BRBPR  Musculoskeletal ROS: no muscle pain  Neurological ROS: no headaches  Dermatological ROS: no rashes  HEENT: +glasses, + sinus   ROS: per HPI     PMHx:  Past Medical History:   Diagnosis Date    Allergy     Arthritis     Environmental allergies     Hypertension     Urinary tract infection    hypogonadism  Erectile dysfunction  Kidney stones: No    PSHx:  Past Surgical History:   Procedure Laterality Date    HERNIA REPAIR      INGUNAL     VASECTOMY      WISDOM TOOTH EXTRACTION     left IHr    Stents/Valves/Foreign Bodies: No  Cardiac Evaluation:   Colonoscopy: yes, twice, last one 9 years ago - due for one    Fam Hx:  MGF - had prostate cancer at a.84  No kidney stones    Soc Hx:  No tobacco.    No alcohol  Lives in [place]   :yes  Children: 3  Occupation:retired environmental science and navy officer    Allergies:  Codeine; Morphine; and Sulfa (sulfonamide antibiotics)   Sulfa     Urologic Medications:   Anticoagulation: Yes - asa 81mg daily    Objective:     Vitals:    07/18/19 1107   BP: 110/75   Pulse: 87     General:WDWN in NAD  Eyes: PERRLA, normal conjunctiva  Respiratory: no increased work on breathing, clear to auscultation  Cardiovascular: regular rate and rhythm. No obvious extremity edema.  GI: palpation of masses. No tenderness. No  hepatosplenomegaly to palpation.  Musculoskeletal: normal range of motion of bilateral upper extremities. Normal muscle strength and tone.  Skin: no obvious rashes or lesions. No tightening of skin noted.  Neurologic: CN grossly normal. Normal sensation.   Psychiatric: awake, alert and oriented x 3. Mood and affect normal. Cooperative.     07/18/2019  Inspection of anus and perineum normal  No scrotal rashes, cysts or lesions  Epididymis normal in size, no tenderness  Testes normal and size, equal size bilaterally, no masses  Urethral meatus normal without discharge  Penis is circumcised  DARRYL: 30, firm on right, boggy no nodules,no tenderness. SV not palpable. Normal sphincter tone. Nohemhorroids.  No bilateral inguinal hernias noted   LIH scar        LABS REVIEW:  BMP  Lab Results   Component Value Date     03/06/2015    K 4.3 03/06/2015     03/06/2015    CO2 27 03/06/2015    BUN 19 03/06/2015    CREATININE 0.9 03/06/2015    CALCIUM 9.4 03/06/2015    ANIONGAP 8 03/06/2015    ESTGFRAFRICA >60.0 03/06/2015    EGFRNONAA >60.0 03/06/2015     Lab Results   Component Value Date    WBC 6.16 03/06/2015    HGB 15.1 03/06/2015    HCT 44.2 03/06/2015    MCV 93 03/06/2015     03/06/2015       PATHOLOGY REVIEW:  none    RADIOGRAPHIC REVIEW:  ctrss 9/1/18  RML clacified granuloma  Left kd partially rotated  inocomplete emptying of bladder  Mild urinary bladder wall thickening         Assessment:       1. Urinary tract infection without hematuria, site unspecified    2. Benign prostatic hyperplasia, unspecified whether lower urinary tract symptoms present    3. Chronic prostatitis    4. Urinary retention    5. Hypogonadism in male          Plan:         Failed alfuzosin and flomax  Uroflow and pvr today  On testosterone 1.62% 3 pumps a day for ED, which can worsen bph - continue to see pcp for this  Refilled cialis 5mg daily through [employer] online and printed rx   Ok to d/c daily cialis when wanting to use  viagra for ED.   H/o malrotated kidney (left)-ctu   Psa, st cr and ctu , ua, ua microscopic and culture around July 31  cysto, trus on same day on aug 12.  gent80 mg IM  Send urine for culture today     Mr. Kerr has a long history of BPH, recurrent UTI, recurrent prostatitis and history of urinary retention.  He has had about 6-8 UTIs in the past 2 years alone.  I do not have the results of the cultures but he is symptomatic.  I suspect this is all due to enlarged prostate.  Although his prostate is only 30 g or less on exam he could have some anatomy that prevents him from emptying completely.  He previously tried out few Zosyn and tamsulosin with success but side effects prevented him from being able to take.  He is on Cialis 5 mg daily and states this helps but again continues to have urinary tract infections.  Today his residual is 55. .  I also would like to schedule him for CT urogram.  He has a history of a left malrotated kidney and if it intermittently drained poorly then this could also cause UTIs, however he denies any left flank pain. I would like to schedule him for cystoscopy and transrectal ultrasound in anticipation of up treatment for his prostate.     Today on rectal exam his prostate is only 30 g, but it is boggy although not tender on exam.  Also the right side is firm.  His last PSA was in 2017 and was the same as it has been for many years.  I would like to get a PSA but want to hold off until he finishes his antibiotics as PSA can be elevated if he has active infection.  He will return on July 31st for a PSA a stat creatinine, a CT you, the urinalysis and culture in anticipation of his cystoscopy and transrectal ultrasound which is scheduled for August 12.    If he develops a UTI between July 31st on August 12 he will have to get that checked was in block see and be treated for that prior to his procedure.    In the meantime I am refilling his Cialis 5 mg daily.  He is going to return for a  flow study test today.    He is going to continue to have treatment by his primary care for his low testosterone.    Latia Yoon MD

## 2019-07-18 NOTE — H&P (VIEW-ONLY)
[facility] Note  Staff: MD Karrie  Referring: TONY Meadows    My chart: active    Chief Complaint: Establish Care; Urinary Tract Infection; and Erectile Dysfunction      Subjective:        HPI: Manpreet Kerr is a 73 y.o. male presents with hypogonadism and ED     Initially seen by me in 2015 for low T and elevated PSA, and reports undergoing a prostate biopsy and US in the past with Dr. Romero in [place] in 2008.     He was living in the [facility] for a year and moved back to the  Sept 2013. He was     6/2016 - developed retention. Pvr: 400. had tried flomax in past but caused bad dreams, flu and fatigue however it helped.   3/2018 he developed retention, pvr: 500, 700cc drained. Tried alfuzosin, made him dizzy, however it helped.  Started on cialis 5mg daily for bph and ED and he has improvement on this.  Voiding trial after had pvr 21cc on fill and pull.     Also wants vas reversal. 25 yo wife. [facility] and reversal success 60%. On list of vas[facility]otomy at [facility].     On T therapy. Good energy level. Takes extra cialis when he needs to have an erection however viagra works better.     Sx of uti 7/15 and was started on cipro.  6-8 uti's in the last 2 years. Sx include burning, frequency, urethral itching, pressure in bladder, nocturia x2 (normally 1x a night). LN in left hip ache.  Never had a cystoscopy or transrectal ultrasound.       ua void (circ): tr leuk, pvr: 55cc - currently on cipro 3rd day  Urine history: -  10/16/18 No cx, void: neg, pvr: 0 , mycoplasma and urea neg   9/1/18  Multiple org, void: sml leuk, 30-50 wbc  3/8/18  Pvr: 21 (fill a nd pull)   3/2018  Pvr by I&O: 700cc   10/29/17 No cx, void:  Neg  6/2/16  E.coli  6/7/16  Pvr: 11cc  5/31/16 pvr by I&O:  460cc   2/24/15 No cx, void:neg  1/7/15  E.coli res to amp, cipro, lev, tetra, void: neg  10/9/14 No cx, void: neg  4/9/14  No c,x void: neg  3/15/10 Ng, void:  neg  11/6/08 Multiple org    psa  history: MGF - had prostate cancer at a.84  12/15/17 0.458  2016  0.31  2015  0.38  2014  0.5  1/16/12 0.56   1/5/11  0.36  1/6/10  0.30  2008  Negative prostate biopsy     Testosterone history: currently on 1.62% 3 pumps a day  12/15/17 400, psa 0.458 on 1.62% 3 pumps a day   3/26/15 161, 15.1/442.2, psa 0.38 on androgel 2 pumps a day  10/16/14 180  4/11/14 354  2012  androgel 2 pumps a day   1/15/19 177      REVIEW OF SYSTEMS:  General ROS: no fevers, no chills  Psychological ROS: no depression  Endocrine ROS: no heat or cold  Respiratory ROS: no SOB  Cardiovascular ROS: no CP  Gastrointestinal ROS: no abdominal pain, no constipation, no diarrhea, no BRBPR  Musculoskeletal ROS: no muscle pain  Neurological ROS: no headaches  Dermatological ROS: no rashes  HEENT: +glasses, + sinus   ROS: per HPI     PMHx:  Past Medical History:   Diagnosis Date    Allergy     Arthritis     Environmental allergies     Hypertension     Urinary tract infection    hypogonadism  Erectile dysfunction  Kidney stones: No    PSHx:  Past Surgical History:   Procedure Laterality Date    HERNIA REPAIR      INGUNAL     VASECTOMY      WISDOM TOOTH EXTRACTION     left IHr    Stents/Valves/Foreign Bodies: No  Cardiac Evaluation:   Colonoscopy: yes, twice, last one 9 years ago - due for one    Fam Hx:  MGF - had prostate cancer at a.84  No kidney stones    Soc Hx:  No tobacco.    No alcohol  Lives in [place]   :yes  Children: 3  Occupation:retired environmental science and navy officer    Allergies:  Codeine; Morphine; and Sulfa (sulfonamide antibiotics)   Sulfa     Urologic Medications:   Anticoagulation: Yes - asa 81mg daily    Objective:     Vitals:    07/18/19 1107   BP: 110/75   Pulse: 87     General:WDWN in NAD  Eyes: PERRLA, normal conjunctiva  Respiratory: no increased work on breathing, clear to auscultation  Cardiovascular: regular rate and rhythm. No obvious extremity edema.  GI: palpation of masses. No tenderness. No  hepatosplenomegaly to palpation.  Musculoskeletal: normal range of motion of bilateral upper extremities. Normal muscle strength and tone.  Skin: no obvious rashes or lesions. No tightening of skin noted.  Neurologic: CN grossly normal. Normal sensation.   Psychiatric: awake, alert and oriented x 3. Mood and affect normal. Cooperative.     07/18/2019  Inspection of anus and perineum normal  No scrotal rashes, cysts or lesions  Epididymis normal in size, no tenderness  Testes normal and size, equal size bilaterally, no masses  Urethral meatus normal without discharge  Penis is circumcised  DARRYL: 30, firm on right, boggy no nodules,no tenderness. SV not palpable. Normal sphincter tone. Nohemhorroids.  No bilateral inguinal hernias noted   LIH scar        LABS REVIEW:  BMP  Lab Results   Component Value Date     03/06/2015    K 4.3 03/06/2015     03/06/2015    CO2 27 03/06/2015    BUN 19 03/06/2015    CREATININE 0.9 03/06/2015    CALCIUM 9.4 03/06/2015    ANIONGAP 8 03/06/2015    ESTGFRAFRICA >60.0 03/06/2015    EGFRNONAA >60.0 03/06/2015     Lab Results   Component Value Date    WBC 6.16 03/06/2015    HGB 15.1 03/06/2015    HCT 44.2 03/06/2015    MCV 93 03/06/2015     03/06/2015       PATHOLOGY REVIEW:  none    RADIOGRAPHIC REVIEW:  ctrss 9/1/18  RML clacified granuloma  Left kd partially rotated  inocomplete emptying of bladder  Mild urinary bladder wall thickening         Assessment:       1. Urinary tract infection without hematuria, site unspecified    2. Benign prostatic hyperplasia, unspecified whether lower urinary tract symptoms present    3. Chronic prostatitis    4. Urinary retention    5. Hypogonadism in male          Plan:         Failed alfuzosin and flomax  Uroflow and pvr today  On testosterone 1.62% 3 pumps a day for ED, which can worsen bph - continue to see pcp for this  Refilled cialis 5mg daily through [employer] online and printed rx   Ok to d/c daily cialis when wanting to use  viagra for ED.   H/o malrotated kidney (left)-ctu   Psa, st cr and ctu , ua, ua microscopic and culture around July 31  cysto, trus on same day on aug 12.  gent80 mg IM  Send urine for culture today     Mr. Kerr has a long history of BPH, recurrent UTI, recurrent prostatitis and history of urinary retention.  He has had about 6-8 UTIs in the past 2 years alone.  I do not have the results of the cultures but he is symptomatic.  I suspect this is all due to enlarged prostate.  Although his prostate is only 30 g or less on exam he could have some anatomy that prevents him from emptying completely.  He previously tried out few Zosyn and tamsulosin with success but side effects prevented him from being able to take.  He is on Cialis 5 mg daily and states this helps but again continues to have urinary tract infections.  Today his residual is 55. .  I also would like to schedule him for CT urogram.  He has a history of a left malrotated kidney and if it intermittently drained poorly then this could also cause UTIs, however he denies any left flank pain. I would like to schedule him for cystoscopy and transrectal ultrasound in anticipation of up treatment for his prostate.     Today on rectal exam his prostate is only 30 g, but it is boggy although not tender on exam.  Also the right side is firm.  His last PSA was in 2017 and was the same as it has been for many years.  I would like to get a PSA but want to hold off until he finishes his antibiotics as PSA can be elevated if he has active infection.  He will return on July 31st for a PSA a stat creatinine, a CT you, the urinalysis and culture in anticipation of his cystoscopy and transrectal ultrasound which is scheduled for August 12.    If he develops a UTI between July 31st on August 12 he will have to get that checked was in block see and be treated for that prior to his procedure.    In the meantime I am refilling his Cialis 5 mg daily.  He is going to return for a  flow study test today.    He is going to continue to have treatment by his primary care for his low testosterone.    Latia Yoon MD

## 2019-07-19 NOTE — PROGRESS NOTES
Uroflow results (date: 07/18/19) on  :   Voiding time: 14.9s,   Flow time: 12.6s,   TTP flow: 1.0s,   Peak flowrate: 6.2 mL/s,   Average flowrate: 2.9mL/s,   Intervals: 3,    Voided volume: 37 mL,    Pvr by bladder scan: 63 ml.   Pattern of curve: bell, triple void- small voided amount with elevated residual       Uroflow results reviewed and interpreted by me ()

## 2019-07-21 LAB — BACTERIA UR CULT: ABNORMAL

## 2019-07-21 RX ORDER — DOXYCYCLINE 100 MG/1
100 CAPSULE ORAL 2 TIMES DAILY
Qty: 14 CAPSULE | Refills: 0 | Status: SHIPPED | OUTPATIENT
Start: 2019-07-21 | End: 2019-07-28

## 2019-07-29 ENCOUNTER — PATIENT MESSAGE (OUTPATIENT)
Dept: SURGERY | Facility: AMBULARY SURGERY CENTER | Age: 74
End: 2019-07-29

## 2019-07-30 ENCOUNTER — OFFICE VISIT (OUTPATIENT)
Dept: UROLOGY | Facility: CLINIC | Age: 74
End: 2019-07-30
Payer: MEDICARE

## 2019-07-30 ENCOUNTER — APPOINTMENT (OUTPATIENT)
Dept: LAB | Facility: HOSPITAL | Age: 74
End: 2019-07-30
Attending: NURSE PRACTITIONER
Payer: MEDICARE

## 2019-07-30 VITALS
RESPIRATION RATE: 18 BRPM | HEIGHT: 70 IN | SYSTOLIC BLOOD PRESSURE: 118 MMHG | BODY MASS INDEX: 34.82 KG/M2 | DIASTOLIC BLOOD PRESSURE: 78 MMHG | HEART RATE: 91 BPM | WEIGHT: 243.19 LBS | TEMPERATURE: 98 F

## 2019-07-30 DIAGNOSIS — B37.41 CANDIDAL URETHRITIS: ICD-10-CM

## 2019-07-30 DIAGNOSIS — N39.0 URINARY TRACT INFECTION WITH HEMATURIA, SITE UNSPECIFIED: Primary | ICD-10-CM

## 2019-07-30 DIAGNOSIS — N40.1 BENIGN PROSTATIC HYPERPLASIA WITH LOWER URINARY TRACT SYMPTOMS, SYMPTOM DETAILS UNSPECIFIED: ICD-10-CM

## 2019-07-30 DIAGNOSIS — R31.9 URINARY TRACT INFECTION WITH HEMATURIA, SITE UNSPECIFIED: Primary | ICD-10-CM

## 2019-07-30 DIAGNOSIS — N39.0 RECURRENT UTI (URINARY TRACT INFECTION): ICD-10-CM

## 2019-07-30 LAB
BILIRUB SERPL-MCNC: NORMAL MG/DL
BILIRUB UR QL STRIP: NEGATIVE
BLOOD URINE, POC: NORMAL
CLARITY UR: CLEAR
COLOR UR: YELLOW
COLOR, POC UA: YELLOW
GLUCOSE UR QL STRIP: NEGATIVE
GLUCOSE UR QL STRIP: NORMAL
HGB UR QL STRIP: NEGATIVE
KETONES UR QL STRIP: NEGATIVE
KETONES UR QL STRIP: NORMAL
LEUKOCYTE ESTERASE UR QL STRIP: NEGATIVE
LEUKOCYTE ESTERASE URINE, POC: NORMAL
NITRITE UR QL STRIP: NEGATIVE
NITRITE, POC UA: NORMAL
PH UR STRIP: 6 [PH] (ref 5–8)
PH, POC UA: 5.5
POC RESIDUAL URINE VOLUME: 31 ML (ref 0–100)
PROT UR QL STRIP: NEGATIVE
PROTEIN, POC: NORMAL
SP GR UR STRIP: 1.02 (ref 1–1.03)
SPECIFIC GRAVITY, POC UA: 1.02
URN SPEC COLLECT METH UR: NORMAL
UROBILINOGEN UR STRIP-ACNC: NEGATIVE EU/DL
UROBILINOGEN, POC UA: 0.2

## 2019-07-30 PROCEDURE — 81003 URINALYSIS AUTO W/O SCOPE: CPT

## 2019-07-30 PROCEDURE — 99214 OFFICE O/P EST MOD 30 MIN: CPT | Mod: S$PBB,,, | Performed by: NURSE PRACTITIONER

## 2019-07-30 PROCEDURE — 81002 URINALYSIS NONAUTO W/O SCOPE: CPT | Mod: PBBFAC,PN | Performed by: NURSE PRACTITIONER

## 2019-07-30 PROCEDURE — 99999 PR PBB SHADOW E&M-EST. PATIENT-LVL V: ICD-10-PCS | Mod: PBBFAC,,, | Performed by: NURSE PRACTITIONER

## 2019-07-30 PROCEDURE — 99214 PR OFFICE/OUTPT VISIT, EST, LEVL IV, 30-39 MIN: ICD-10-PCS | Mod: S$PBB,,, | Performed by: NURSE PRACTITIONER

## 2019-07-30 PROCEDURE — 99215 OFFICE O/P EST HI 40 MIN: CPT | Mod: PBBFAC,PN | Performed by: NURSE PRACTITIONER

## 2019-07-30 PROCEDURE — 51798 US URINE CAPACITY MEASURE: CPT | Mod: PBBFAC,PN | Performed by: NURSE PRACTITIONER

## 2019-07-30 PROCEDURE — 87107 FUNGI IDENTIFICATION MOLD: CPT

## 2019-07-30 PROCEDURE — 87102 FUNGUS ISOLATION CULTURE: CPT

## 2019-07-30 PROCEDURE — 99999 PR PBB SHADOW E&M-EST. PATIENT-LVL V: CPT | Mod: PBBFAC,,, | Performed by: NURSE PRACTITIONER

## 2019-07-30 PROCEDURE — 87086 URINE CULTURE/COLONY COUNT: CPT

## 2019-07-30 RX ORDER — FLUCONAZOLE 150 MG/1
150 TABLET ORAL DAILY
Qty: 3 TABLET | Refills: 0 | Status: SHIPPED | OUTPATIENT
Start: 2019-07-30 | End: 2019-08-02

## 2019-07-30 RX ORDER — LOSARTAN POTASSIUM 100 MG/1
TABLET ORAL
COMMUNITY
Start: 2019-07-21

## 2019-07-30 NOTE — PROGRESS NOTES
[facility] Note  Staff: FUNMI Avila    PCP: None on File    Chief Complaint: UTI, possible yeast infection    Subjective:        HPI: Manpreet Kerr is a 73 y.o. male presents today requesting recheck of his urine.  Recently was treated for UTI, and is now completed antibx regimen from last ov with MD.  NO current LUTS voiced except possible yeast infection that he obtained from his wife.  He has been using Lotrimin cream for this to tip of meatus which has improved his symptoms at this time.    The pt was last seen by Dr. Latia Yoon on 07/18/2019 for hypogonadism and ED issues.    Mr. Kerr has a long history of BPH, recurrent UTI, recurrent prostatitis and history of urinary retention.  He has had about 6-8 UTIs in the past 2 years alone.  We do not have the results of the cultures but he is symptomatic but we suspect this is all due to enlarged prostate.  Although his prostate is only 30 g or less on exam he could have some anatomy that prevents him from emptying completely.  He previously tried out few Zosyn and tamsulosin with success but side effects prevented him from being able to take.  He is on Cialis 5 mg daily and states this helps but again continues to have urinary tract infections.    He has a history of a left malrotated kidney and if it intermittently drained poorly then this could also cause UTIs, however he denies any left flank pain.    REVIEW OF SYSTEMS:  A comprehensive 10 system review was performed and is negative except as noted above in HPI    PMHx:  Past Medical History:   Diagnosis Date    Allergy     Arthritis     BPH with obstruction/lower urinary tract symptoms     Environmental allergies     H/O prostatitis     History of urinary retention     Hypertension     Urinary tract infection      PSHx:  Past Surgical History:   Procedure Laterality Date    HERNIA REPAIR      INGUNAL     VASECTOMY      WISDOM TOOTH EXTRACTION        Allergies:  Codeine; Morphine; and Sulfa (sulfonamide antibiotics)    Medications: reviewed   Objective:     Vitals:    07/30/19 1314   BP: 118/78   Pulse: 91   Resp: 18   Temp: 98.3 °F (36.8 °C)     General:WDWN in NAD  Eyes: PERRLA, normal conjunctiva  Respiratory: no increased work on breathing, clear to auscultation  Cardiovascular: regular rate and rhythm. No obvious extremity edema.  GI: palpation of masses. No tenderness. No hepatosplenomegaly to palpation.  Musculoskeletal: normal range of motion of bilateral upper extremities. Normal muscle strength and tone.  Skin: no obvious rashes or lesions. No tightening of skin noted.  Neurologic: CN grossly normal. Normal sensation.   Psychiatric: awake, alert and oriented x 3. Mood and affect normal. Cooperative.    PVR by bladder scan performed by MA today:  31 mL.    LABS REVIEW:  UA today:  Color:Clear, Yellow  Spec. Grav.  1.020  PH  5.5  Negative for leukocytes, nitrates, protein, glucose, ketones, urobili, bili, and blood.    Cr:   Lab Results   Component Value Date    CREATININE 0.9 03/06/2015     Assessment:       1. Urinary tract infection with hematuria, site unspecified    2. Benign prostatic hyperplasia with lower urinary tract symptoms, symptom details unspecified    3. Candidal urethritis    4. Recurrent UTI (urinary tract infection)          Plan:   Recurrent UTI, yeast infection:    UA, Urine culture, Culture Fungus from urine to be performed  Pt will continue using OTC Lotrimin cream for candida and I have prescribed Diflucan 150 mg x 3 doses for this pt.    F/u as scheduled by Dr. Yoon.    [facility]: ACTIVE    Pepper Hensley, MARIC

## 2019-07-30 NOTE — PATIENT INSTRUCTIONS
Candida Skin Infection (Adult)  Candida is type of yeast. It grows naturally on the skin and in the mouth. If it grows out of control, it can cause an infection. Candida can cause infections in the genital area, skin folds, in the mouth, and under the breasts. Anyone can get this infection. It is more common in a person with a weak immune system, such as from diabetes, HIV, or cancer. Its also more common in someone who has been on antibiotic therapy. And its more common people who are overweight or who have incontinence. Wearing tight-fitting clothing and taking part in activities with lots of skin-to-skin contact can also put you at risk.  Candida causes the skin to become bright red and inflamed. The border of the infected part of the skin is often raised. The infection causes pain and itching. Sometimes the skin peels and bleeds. In the mouth, candida is called thrush, and may cause white thickened areas.  A Candida rash is most often treated with an antifungal cream or ointment. The rash will clear a few days after starting the medicine. Infections that dont go away may need a prescription medicine. In rare cases, a bacterial infection can also occur.  Home care  Your healthcare provider will recommend an antifungal cream or ointment for the rash. He or she may also prescribe a medicine for the itch. Follow all instructions for using these medicines. Dont use cornstarch powder. Cornstarch can cause the Candida infection to get worse.  General care:  · Keep your skin clean by washing the area twice a day.  · Use the cream as directed until your rash is gone. Once the skin has healed, keep it dry to prevent another infection.   · If you are overweight, talk with your healthcare provider about a plan to lose excess weight.  · Avoid clothes that fit tightly.  Follow-up care  Follow up with your healthcare provider, or as advised. Your rash will clear in 7 to 14 days. Call your healthcare provider if the rash  is not gone after 14 days.  When to seek medical advice  Call your healthcare provider right away if any of these occur:  · Pain or redness that gets worse or spreads  · Fluid coming from the skin  · Yellow crusts on the skin  · Fever of 100.4°F (38°C) or higher, or as directed by your healthcare provider  Date Last Reviewed: 9/1/2016 © 2000-2017 [employer]. [address] [place] [address], [place] [address]. All rights reserved. This information is not intended as a substitute for professional medical care. Always follow your healthcare professional's instructions.        Jock Itch (Tinea Cruris, General)  Jock itch (tinea cruris) is a red, itchy rash in the groin caused by a fungal infection. It occurs in skin folds where it is warm and moist. It commonly starts as a small, red, itchy patch that grows larger. The patch is usually in the shape of a round ring, 1 to 2 inches wide. It may cause the skin to flake. It may also spread to the scrotum or the skin that covers your testicles. This infection is treated with skin creams or oral medicine.  Home care  · If you were prescribed a cream, use it exactly as directed. You can buy some antifungal creams without a prescription.  · It may take a week before the fungus starts to go away. It can take about 2 to 3 weeks to completely clear. To stop the rash from coming back, keep using the medicine until the rash is all gone.  · Wash the area at least once a day with soap and water. Pat dry and apply medicine.   · Wear loose-fitting underwear to let your skin breathe. Change your underwear daily.  · Once the rash is gone, keep the area clean and dry to prevent reinfection. If recurrence is a problem, use a medicated antifungal powder daily. This is available over the counter.  Prevention  The following tips may help prevent jock itch:  · Don't share clothes, towels, or sports gear with others unless they have been washed.  · Change your underwear daily.  · Keep skin  clean and dry, especially after showering or swimming.  · Lose weight.  · Do not wear tight underwear.  · Treat athlete's foot if it occurs.  Follow-up care  Follow up with your healthcare provider, or as advised. Call your provider if the rash is not starting to improve after 10 days of treatment, or if the rash continues to spread.  When to seek medical advice  Call your healthcare provider right away if any of these occur:  · Increasing pain in the rash area  · Redness that spreads around the rash  · Fluid draining from the rash  · Rash returns soon after treatment  · Fever of 100.4°F (38°C) or higher, or as directed by your provider  Date Last Reviewed: 8/1/2016  © 6122-8675 [employer]. [address] [place] [address], [place] [address]. All rights reserved. This information is not intended as a substitute for professional medical care. Always follow your healthcare professional's instructions.

## 2019-07-31 ENCOUNTER — HOSPITAL ENCOUNTER (OUTPATIENT)
Dept: RADIOLOGY | Facility: HOSPITAL | Age: 74
Discharge: HOME | End: 2019-07-31
Attending: UROLOGY
Payer: MEDICARE

## 2019-07-31 DIAGNOSIS — N41.1 CHRONIC PROSTATITIS: ICD-10-CM

## 2019-07-31 DIAGNOSIS — R33.9 URINARY RETENTION: ICD-10-CM

## 2019-07-31 DIAGNOSIS — E29.1 HYPOGONADISM IN MALE: ICD-10-CM

## 2019-07-31 DIAGNOSIS — N39.0 URINARY TRACT INFECTION WITHOUT HEMATURIA, SITE UNSPECIFIED: ICD-10-CM

## 2019-07-31 DIAGNOSIS — N40.0 BENIGN PROSTATIC HYPERPLASIA, UNSPECIFIED WHETHER LOWER URINARY TRACT SYMPTOMS PRESENT: ICD-10-CM

## 2019-07-31 PROCEDURE — 25500020 PHARM REV CODE 255: Performed by: UROLOGY

## 2019-07-31 PROCEDURE — 74178 CT UROGRAM ABD PELVIS W WO: ICD-10-PCS | Mod: 26,,, | Performed by: RADIOLOGY

## 2019-07-31 PROCEDURE — 74178 CT ABD&PLV WO CNTR FLWD CNTR: CPT | Mod: TC

## 2019-07-31 PROCEDURE — 74178 CT ABD&PLV WO CNTR FLWD CNTR: CPT | Mod: 26,,, | Performed by: RADIOLOGY

## 2019-07-31 RX ADMIN — IOHEXOL 125 ML: 350 INJECTION, SOLUTION INTRAVENOUS at 09:07

## 2019-08-01 LAB — BACTERIA UR CULT: NO GROWTH

## 2019-08-06 ENCOUNTER — OFFICE VISIT (OUTPATIENT)
Dept: DERMATOLOGY | Facility: CLINIC | Age: 74
End: 2019-08-06
Payer: MEDICARE

## 2019-08-06 ENCOUNTER — TELEPHONE (OUTPATIENT)
Dept: UROLOGY | Facility: CLINIC | Age: 74
End: 2019-08-06

## 2019-08-06 VITALS — BODY MASS INDEX: 34.8 KG/M2 | WEIGHT: 242.5 LBS

## 2019-08-06 DIAGNOSIS — B37.2 SKIN YEAST INFECTION: ICD-10-CM

## 2019-08-06 DIAGNOSIS — L30.4 INTERTRIGO: Primary | ICD-10-CM

## 2019-08-06 DIAGNOSIS — L82.1 SK (SEBORRHEIC KERATOSIS): ICD-10-CM

## 2019-08-06 PROCEDURE — 99202 OFFICE O/P NEW SF 15 MIN: CPT | Mod: S$PBB,,, | Performed by: DERMATOLOGY

## 2019-08-06 PROCEDURE — 99999 PR PBB SHADOW E&M-EST. PATIENT-LVL III: CPT | Mod: PBBFAC,,, | Performed by: DERMATOLOGY

## 2019-08-06 PROCEDURE — 99999 PR PBB SHADOW E&M-EST. PATIENT-LVL III: ICD-10-PCS | Mod: PBBFAC,,, | Performed by: DERMATOLOGY

## 2019-08-06 PROCEDURE — 99202 PR OFFICE/OUTPT VISIT, NEW, LEVL II, 15-29 MIN: ICD-10-PCS | Mod: S$PBB,,, | Performed by: DERMATOLOGY

## 2019-08-06 PROCEDURE — 99213 OFFICE O/P EST LOW 20 MIN: CPT | Mod: PBBFAC,PO | Performed by: DERMATOLOGY

## 2019-08-06 NOTE — PATIENT INSTRUCTIONS
Apply the triamcinolone to groin and testicle 2 x's a day for 5 days straight.  If no better in that time, start back with the OTC lotrimin and contact office either via phone or message on portal for further recommendations.      For preventative treatment--apply cornstarch to groin daily for absorption of sweat.

## 2019-08-06 NOTE — TELEPHONE ENCOUNTER
----- Message from Blair Wharton sent at 8/6/2019  9:21 AM CDT -----  Contact: Patient  Type:  Patient Returning Call    Who Called:  Patient  Who Left Message for Patient:  Jessica  Does the patient know what this is regarding?:  Visit questiongs  Best Call Back Number:  [phone]

## 2019-08-06 NOTE — TELEPHONE ENCOUNTER
----- Message from Brook Glover sent at 8/6/2019  9:14 AM CDT -----  Contact: self  Patient requesting to speak to Jessica regarding patient states Dr. Yoon put comments in his records        Please call to advice [phone] (home)

## 2019-08-06 NOTE — PROGRESS NOTES
Patient last seen by Jose Reyes in 2015 for contact dermatitis. Intermittent episodes treated with triamcinolone and works with a few days of application.  \  Currently has rash to groin but denies itch.  Using an OTC clotrimazole for about 8 days.  Remains very red.  Reports wife has yeast infection. Patient is currently on oral antibiotic for urinary infection.      Subjective:       Patient ID:  Manpreet Kerr is a 73 y.o. male who presents for   Chief Complaint   Patient presents with    Rash     groin area     HPI    Review of Systems   Constitutional: Negative for fever and chills.   HENT: Negative for sore throat and mouth sores.    Eyes: Negative for eye watering.   Respiratory: Negative for cough.    Gastrointestinal: Negative for abdominal pain.   Musculoskeletal: Negative for joint swelling and arthralgias.   Skin: Positive for rash and wears hat. Negative for itching, dry skin, daily sunscreen use and activity-related sunscreen use.   Neurological: Negative for numbness.   Hematologic/Lymphatic: Does not bruise/bleed easily.        Objective:    Physical Exam   Constitutional: He appears well-developed and well-nourished.   Eyes: No conjunctival no injection.   Neurological: He is alert and oriented to person, place, and time.   Psychiatric: He has a normal mood and affect.   Skin:   Areas Examined (abnormalities noted in diagram):   Scalp / Hair Palpated and Inspected  Head / Face Inspection Performed  Neck Inspection Performed  Genitals / Buttocks / Groin Inspection Performed  RUE Inspected  LUE Inspection Performed              Diagram Legend     Erythematous scaling macule/papule c/w actinic keratosis       Vascular papule c/w angioma      Pigmented verrucoid papule/plaque c/w seborrheic keratosis      Yellow umbilicated papule c/w sebaceous hyperplasia      Irregularly shaped tan macule c/w lentigo     1-2 mm smooth white papules consistent with Milia      Movable subcutaneous cyst with  punctum c/w epidermal inclusion cyst      Subcutaneous movable cyst c/w pilar cyst      Firm pink to brown papule c/w dermatofibroma      Pedunculated fleshy papule(s) c/w skin tag(s)      Evenly pigmented macule c/w junctional nevus     Mildly variegated pigmented, slightly irregular-bordered macule c/w mildly atypical nevus      Flesh colored to evenly pigmented papule c/w intradermal nevus       Pink pearly papule/plaque c/w basal cell carcinoma      Erythematous hyperkeratotic cursted plaque c/w SCC      Surgical scar with no sign of skin cancer recurrence      Open and closed comedones      Inflammatory papules and pustules      Verrucoid papule consistent consistent with wart     Erythematous eczematous patches and plaques     Dystrophic onycholytic nail with subungual debris c/w onychomycosis     Umbilicated papule    Erythematous-base heme-crusted tan verrucoid plaque consistent with inflamed seborrheic keratosis     Erythematous Silvery Scaling Plaque c/w Psoriasis     See annotation      Assessment / Plan:        Intertrigo  Discussed with patient the etiology and pathogenesis of the disease or skin lesion(s) and possible treatments and aggravators.    Reviewed with patient different treatment options and associated risks.  Educated patient about keeping body folds free of sweat with routine wiping and regular cornstarch usage in addition to prescription therapy.  Pt's old tac 0.1 bid prn but at least 5 day trial.  Pt to call if not better.    SK (seborrheic keratosis)  Discussed with patient the benign nature of these lesions and that no treatment is indicated.      Skin yeast infection  Discussed with patient the etiology and pathogenesis of the disease or skin lesion(s) and possible treatments and aggravators.    Unlikely at this point.  Even with improvement with 8 days of otc lotrimin (per pt), erythema is significant today.           Follow up in about 2 weeks (around 8/20/2019), or if symptoms  worsen or fail to improve.

## 2019-08-09 ENCOUNTER — TELEPHONE (OUTPATIENT)
Dept: UROLOGY | Facility: CLINIC | Age: 74
End: 2019-08-09

## 2019-08-09 NOTE — TELEPHONE ENCOUNTER
Spoke with patient he has a cystoscopy and prostate ultrasound and has sore throat, no fever. Wants to know if he should continue with procedure on Monday. Please advise

## 2019-08-12 ENCOUNTER — TELEPHONE (OUTPATIENT)
Dept: UROLOGY | Facility: CLINIC | Age: 74
End: 2019-08-12

## 2019-08-12 ENCOUNTER — HOSPITAL ENCOUNTER (OUTPATIENT)
Facility: AMBULARY SURGERY CENTER | Age: 74
Discharge: HOME | End: 2019-08-12
Attending: UROLOGY | Admitting: UROLOGY
Payer: MEDICARE

## 2019-08-12 DIAGNOSIS — N39.0 URINARY TRACT INFECTION WITHOUT HEMATURIA, SITE UNSPECIFIED: ICD-10-CM

## 2019-08-12 DIAGNOSIS — N40.0 BENIGN PROSTATIC HYPERPLASIA, UNSPECIFIED WHETHER LOWER URINARY TRACT SYMPTOMS PRESENT: ICD-10-CM

## 2019-08-12 LAB
BACTERIA SPEC CULT: NORMAL
BILIRUB SERPL-MCNC: NORMAL MG/DL
BLOOD URINE, POC: NORMAL
CASTS: NORMAL
COLOR, POC UA: NORMAL
CRYSTALS: NORMAL
GLUCOSE UR QL STRIP: NORMAL
KETONES UR QL STRIP: NORMAL
LEUKOCYTE ESTERASE URINE, POC: NORMAL
NITRITE, POC UA: NORMAL
PH, POC UA: 5
PROTEIN, POC: NORMAL
RBC CELLS COUNTED: NORMAL
SPECIFIC GRAVITY, POC UA: 1.02
UROBILINOGEN, POC UA: NORMAL
WHITE BLOOD CELLS: NORMAL

## 2019-08-12 PROCEDURE — 52000 CYSTOURETHROSCOPY: CPT | Performed by: UROLOGY

## 2019-08-12 PROCEDURE — 76872 US TRANSRECTAL: CPT | Performed by: UROLOGY

## 2019-08-12 PROCEDURE — 76872 PR US TRANSRECTAL: ICD-10-PCS | Mod: 26,,, | Performed by: UROLOGY

## 2019-08-12 PROCEDURE — 76872 US TRANSRECTAL: CPT | Mod: 26,,, | Performed by: UROLOGY

## 2019-08-12 PROCEDURE — 52000 CYSTOURETHROSCOPY: CPT | Mod: ,,, | Performed by: UROLOGY

## 2019-08-12 PROCEDURE — 52000 PR CYSTOURETHROSCOPY: ICD-10-PCS | Mod: ,,, | Performed by: UROLOGY

## 2019-08-12 PROCEDURE — 87086 URINE CULTURE/COLONY COUNT: CPT

## 2019-08-12 RX ORDER — GENTAMICIN SULFATE 40 MG/ML
80 INJECTION, SOLUTION INTRAMUSCULAR; INTRAVENOUS ONCE
Status: COMPLETED | OUTPATIENT
Start: 2019-08-12 | End: 2019-08-12

## 2019-08-12 RX ORDER — LIDOCAINE HYDROCHLORIDE 20 MG/ML
JELLY TOPICAL ONCE
Status: COMPLETED | OUTPATIENT
Start: 2019-08-12 | End: 2019-08-12

## 2019-08-12 RX ORDER — WATER 1000 ML/1000ML
INJECTION, SOLUTION INTRAVENOUS
Status: DISCONTINUED | OUTPATIENT
Start: 2019-08-12 | End: 2019-08-12 | Stop reason: HOSPADM

## 2019-08-12 RX ORDER — LIDOCAINE HYDROCHLORIDE 20 MG/ML
JELLY TOPICAL
Status: DISCONTINUED | OUTPATIENT
Start: 2019-08-12 | End: 2019-08-12 | Stop reason: HOSPADM

## 2019-08-12 RX ADMIN — LIDOCAINE HYDROCHLORIDE 5 ML: 20 JELLY TOPICAL at 03:08

## 2019-08-12 RX ADMIN — GENTAMICIN SULFATE 80 MG: 40 INJECTION, SOLUTION INTRAMUSCULAR; INTRAVENOUS at 02:08

## 2019-08-12 NOTE — DISCHARGE INSTRUCTIONS
Assessment: Manpreet Kerr is a 73 y.o. male with BPH, recurrent UTIs for patient but none documented since 2016m  and history of elevated urine residuals.  Today cystoscopy and transrectal ultrasound showed bilateral lobe hypertrophy with no median lobe, 22 g prostate.  He cannot tolerate a full 1 blockers and is on Cialis and wants to see if he could proceed with the procedure.    Plan:  Follow up in 2 months with me to schedule urolift, his wife will be back from [facility]  Clearance from pcp for urolift- given prescription pad request with copy of h& and op note from today   Pt shown video of urolift  Finish cipro   Continue cialis 5mg daily for bph for now  Follow up urine culture from cystoscope  Pt to also see pcp about liver cyst - copy of ct urogram to be attached to request        Before leaving, please make sure you have all your personal belongings such as glasses, purses, wallets, keys, cell phones, jewelry, jackets etc   Transrectal Ultrasound   A transrectal ultrasound is an imaging test. It uses sound waves to create pictures of a mans prostate gland to determine its size.Your prostate gland is in front of your rectum and if too large may become inflamed and impede the flow of urine. For this test, a special probe (transducer) is placed directly into your rectum.     Before leaving, you may need to wait for a short time while the images are reviewed. In most cases, you can go back to your normal routine after the test.       © 8912-1969 [employer]. [address] [place] [address], [place] [address]. All rights reserved. This information is not intended as a substitute for professional medical care. Always follow your healthcare professional's instructions.           After the procedure    · Drink plenty of fluids.  · You may have burning or light bleeding when you urinate--this is normal.  · Medications may be prescribed to ease any discomfort or prevent infection. Take these as  directed.  · Call your doctor if you have heavy bleeding or blood clots, burning that lasts more than a day, a fever over 100°F  (38° C), or trouble urinating.

## 2019-08-12 NOTE — TELEPHONE ENCOUNTER
----- Message from Latia Yoon MD sent at 8/12/2019  3:59 PM CDT -----  Follow up in 2 months with me to schedule urolift

## 2019-08-12 NOTE — PLAN OF CARE
Pt  ready to go home , stable. Denies pain.Watched urolift video per DR Karrie ferrari. Ambulated to car accompanied.

## 2019-08-12 NOTE — DISCHARGE SUMMARY
[facility]  Urology  Discharge Note - Short Stay      Patient Name: Manpreet Kerr  MRN: [id number]  Discharge Date and Time:  08/12/2019 5:19 PM  Attending Physician: No att. providers found   Discharging Provider: Latia Yoon MD  Primary Care Physician: Primary Doctor No    Final Active Diagnoses:    Diagnosis Date Noted POA    Urinary tract infection without hematuria [N39.0] 08/12/2019 Yes      Problems Resolved During this Admission:       Final Diagnoses: Same as principal problem.    Hospital Course: Patient was admitted for an outpatient procedure and tolerated the procedure well with no complications.*    Procedure(s) (LRB):  TRANSRECTAL ULTRASOUND (N/A)  CYSTOSCOPY (N/A)     Indwelling Lines/Drains at time of discharge:   Lines/Drains/Airways          None          Discharged Condition: good    Disposition: home    Follow Up:      Patient Instructions:   No discharge procedures on file.    Medications:  Reconciled Home Medications:      Medication List      CONTINUE taking these medications    ANDROGEL 20.25 mg/1.25 gram (1.62 %) Glpm  Generic drug:  testosterone     azelastine 137 mcg (0.1 %) nasal spray  Commonly known as:  ASTELIN  2 sprays (274 mcg total) by Nasal route 2 (two) times daily.     diclofenac sodium 1 % Gel  Commonly known as:  VOLTAREN     ezetimibe 10 mg tablet  Commonly known as:  ZETIA     fluocinolone 0.01 % external oil  Commonly known as:  DERMA-SMOOTHE  Apply topically 3 (three) times daily. Apply to damp scalp at bedtime, wash off qam, avoid chronic use.     fluticasone propionate 50 mcg/actuation nasal spray  Commonly known as:  FLONASE     losartan 100 MG tablet  Commonly known as:  COZAAR     montelukast 10 mg tablet  Commonly known as:  SINGULAIR  TAKE 1 TABLET DAILY     ranitidine 150 MG tablet  Commonly known as:  ZANTAC     tadalafil 5 MG tablet  Commonly known as:  CIALIS  Take 1 tablet (5 mg total) by mouth once daily.     triamcinolone  acetonide 0.1% 0.1 % cream  Commonly known as:  KENALOG  aaa bid x 2 weeks then prn, avoid chronic use            Discharge Procedure Orders (must include Diet, Follow-up, Activity):  No discharge procedures on file.         Latia Yoon MD  Urology  [facility]

## 2019-08-12 NOTE — INTERVAL H&P NOTE
The patient has been examined and the H&P has been reviewed:    I concur with the findings and no changes have occurred since H&P was written.    Anesthesia/Surgery risks, benefits and alternative options discussed and understood by patient/family.          Active Hospital Problems    Diagnosis  POA    Urinary tract infection without hematuria [N39.0]  Yes      Resolved Hospital Problems   No resolved problems to display.     UF inaccurate due to low volume    CT urogram showed a very large 9 cm cyst in his liver.  He should discuss this with his primary care or a gastroenterologist.  Please find out who his primary care is so that we can send them the results. no obvious reason for recurrent urinary tract infections.  He appears to have a normal positioned left kidney.  It looks like he also has a benign small 1 cm adrenal lesion, this is an organ that sits above the kidney    Here today for cysto trus     Cont cialis  Cont testsosterone  psa 0.48 7/13/19

## 2019-08-12 NOTE — OP NOTE
[facility] Procedure Note  Date: 08/12/2019        TRANSRECTAL PROSTATIC ULTRASOUND and Cystoscopy     Procedures: Flexible cystourethroscopy and Transrectal Ultrasound     Pre Procedure Diagnosis:recurrent utis, elevated residuals, bph     Post Procedure Diagnosis: same, see below for findings     Surgeon: Latia Yoon MD     Indications: Manpreet Kerr is a 73 y.o. male with BPH. Current PSA is 0.48. Urine from today reviewed. H&P reviewed. The risks and benefits of both procedures were explained and consent was obtained.      Cytoscopic Specimen: urine from cystoscope sent for culture         TRANSRECTAL ULTRASOUND was performed   The transrectal ultrasound probe was placed in the rectum. Measurements were taken and recorded and the probe was removed.   The patient tolerated the procedures well without complication.     Cystoscopy was performed   2% lidocaine urojet was used for local analgesia.  The genitalia was prepped and draped in the sterile fashion with betadine.    The flexible scope was advanced into the urethra and into the bladder.  Bilateral ureteral orifice were evaluated and noted to be normal with clear efflux.  The bladder was completely surveyed in a systematic fashion and the cytoscope was retroflexed.  Cystoscopy findings as listed below.      Findings: (pictures were  uploaded into media)  He had moderate bilateral lobe hypertrophy with no intravesical median lobe.  No bladder tumors.  PVR by aspiration was about 60 cc.  Urine from the cystoscope was sent for urine culture/  TRUS volume: 22.2g  Prostate Ultrasound findings:  · Seminal vesicles/Ejaculatory Ducts: Normal  · Outline/Symmetry of Prostate: WNL  · Central Gland/Transition Zone: Well demarcated  · Peripheral Zone: WNL     Prostate Measurements:   · Height:  27.7 mm  · Width:  38.6 mm  · Length:  39.7 mm  · Volume: 22.2 cm3  · Intravesical protrusion: none  · PSAD: 0.02     Assessment: Manpreet Kerr  is a 73 y.o. male with BPH, recurrent UTIs for patient but none documented since 2016m  and history of elevated urine residuals.  Today cystoscopy and transrectal ultrasound showed bilateral lobe hypertrophy with no median lobe, 22 g prostate.  He cannot tolerate a full 1 blockers and is on Cialis and wants to see if he could proceed with the procedure.     Plan:  Follow up in 2 months with me to schedule urolift, his wife will be back from [facility]  Clearance from pcp for urolift- given prescription pad request with copy of h& and op note from today   Pt shown video of urolift  Finish cipro   Continue cialis 5mg daily for bph for now  Follow up urine culture from cystoscope  Pt to also see pcp about liver cyst - copy of ct urogram to be attached to request     Latia Yoon MD

## 2019-08-13 VITALS
TEMPERATURE: 98 F | BODY MASS INDEX: 34.82 KG/M2 | WEIGHT: 243.19 LBS | SYSTOLIC BLOOD PRESSURE: 124 MMHG | RESPIRATION RATE: 20 BRPM | HEART RATE: 89 BPM | OXYGEN SATURATION: 96 % | DIASTOLIC BLOOD PRESSURE: 84 MMHG | HEIGHT: 70 IN

## 2019-08-14 LAB — BACTERIA UR CULT: NO GROWTH

## 2019-09-05 LAB — FUNGUS SPEC CULT: NORMAL

## 2019-09-20 ENCOUNTER — TELEPHONE (OUTPATIENT)
Dept: ORTHOPEDICS | Facility: CLINIC | Age: 74
End: 2019-09-20

## 2019-09-20 DIAGNOSIS — M17.0 PRIMARY OSTEOARTHRITIS OF BOTH KNEES: Primary | ICD-10-CM

## 2019-09-20 NOTE — TELEPHONE ENCOUNTER
----- Message from Mohan Russ sent at 9/20/2019  4:08 PM CDT -----  Contact: patient  Type: Needs Medical Advice    Who Called:  patient  Symptoms (please be specific):    How long has patient had these symptoms:    Pharmacy name and phone #:    Best Call Back Number: [phone]  Additional Information: requesting a call back regarding injections ([facility]th[facility])

## 2019-10-15 ENCOUNTER — OFFICE VISIT (OUTPATIENT)
Dept: UROLOGY | Facility: CLINIC | Age: 74
End: 2019-10-15
Payer: MEDICARE

## 2019-10-15 VITALS
HEIGHT: 70 IN | SYSTOLIC BLOOD PRESSURE: 124 MMHG | WEIGHT: 246.5 LBS | BODY MASS INDEX: 35.29 KG/M2 | DIASTOLIC BLOOD PRESSURE: 82 MMHG | HEART RATE: 89 BPM

## 2019-10-15 DIAGNOSIS — N39.0 RECURRENT UTI: ICD-10-CM

## 2019-10-15 DIAGNOSIS — N40.0 BENIGN PROSTATIC HYPERPLASIA, UNSPECIFIED WHETHER LOWER URINARY TRACT SYMPTOMS PRESENT: Primary | ICD-10-CM

## 2019-10-15 DIAGNOSIS — R79.9 ABNORMAL FINDING OF BLOOD CHEMISTRY, UNSPECIFIED: ICD-10-CM

## 2019-10-15 LAB
BILIRUB SERPL-MCNC: ABNORMAL MG/DL
BLOOD URINE, POC: ABNORMAL
COLOR, POC UA: YELLOW
GLUCOSE UR QL STRIP: 100
KETONES UR QL STRIP: ABNORMAL
LEUKOCYTE ESTERASE URINE, POC: ABNORMAL
NITRITE, POC UA: ABNORMAL
PH, POC UA: 5.5
PROTEIN, POC: ABNORMAL
SPECIFIC GRAVITY, POC UA: 1.02
UROBILINOGEN, POC UA: ABNORMAL

## 2019-10-15 PROCEDURE — 81002 URINALYSIS NONAUTO W/O SCOPE: CPT | Mod: PBBFAC,PN | Performed by: UROLOGY

## 2019-10-15 PROCEDURE — 99214 PR OFFICE/OUTPT VISIT, EST, LEVL IV, 30-39 MIN: ICD-10-PCS | Mod: S$PBB,,, | Performed by: UROLOGY

## 2019-10-15 PROCEDURE — 99999 PR PBB SHADOW E&M-EST. PATIENT-LVL III: CPT | Mod: PBBFAC,,, | Performed by: UROLOGY

## 2019-10-15 PROCEDURE — 99999 PR PBB SHADOW E&M-EST. PATIENT-LVL III: ICD-10-PCS | Mod: PBBFAC,,, | Performed by: UROLOGY

## 2019-10-15 PROCEDURE — 99213 OFFICE O/P EST LOW 20 MIN: CPT | Mod: PBBFAC,PN | Performed by: UROLOGY

## 2019-10-15 PROCEDURE — 99214 OFFICE O/P EST MOD 30 MIN: CPT | Mod: S$PBB,,, | Performed by: UROLOGY

## 2019-10-15 RX ORDER — ROSUVASTATIN CALCIUM 10 MG/1
10 TABLET, FILM COATED ORAL DAILY
Status: ON HOLD | COMMUNITY
Start: 2019-09-20 | End: 2020-08-25

## 2019-10-15 RX ORDER — OMEPRAZOLE 10 MG/1
10 CAPSULE, DELAYED RELEASE ORAL DAILY
COMMUNITY

## 2019-10-15 NOTE — PROGRESS NOTES
[facility] Note  Staff: MD Karrie  Referring: TONY Meadows    My chart: active    Chief Complaint: Follow-up (2 mos)      Subjective:        HPI: Manpreet Kerr is a 73 y.o. male presents with hypogonadism and ED     Initially seen by me in 2015 for low T and elevated PSA, and reports undergoing a prostate biopsy and US in the past with Dr. Romero in [place] in 2008.     He was living in the [facility] for a year and moved back to the  Sept 2013. He was     6/2016 - developed retention. Pvr: 400. had tried flomax in past but caused bad dreams, flu and fatigue however it helped.   3/2018 he developed retention, pvr: 500, 700cc drained. Tried alfuzosin, made him dizzy, however it helped.  Started on cialis 5mg daily for bph and ED and he has improvement on this.  Voiding trial after had pvr 21cc on fill and pull.     Also wants vas reversal. 27 yo wife. Seen someone at [facility] and reversal success 60%. On list of vasovasotomy at [facility].     On T therapy. Good energy level. Takes extra cialis when he needs to have an erection however viagra works better.     Sx of uti 7/15 and was started on cipro.  6-8 uti's in the last 2 years. Sx include burning, frequency, urethral itching, pressure in bladder, nocturia x2 (normally 1x a night). LN in left hip ache.     Couldn't tolerate flomax 0.4mg. Cysto trus on 8/12/19 showed b lobe hypertrophy, no stricture. 22g prostate. No IV median lobe. Ctu showed no obvious cause for uti's. Put him on cialis 5mg daily. residuals have been 30-50cc.    Interval history:   Using cialis 5mg daily and he thinks he has a better stream.      ua void (circ): neg/100 glucose - he says he had an a1c. - sending for one today  Urine history:   8/12/19 Ng, void: n/a  7/30/19 No cx, void:neg,   7/18/19 No cx, void:  tr leuk - on cipro 3rd day  10/16/18 No cx, void: neg, , mycoplasma and urea neg   9/1/18  Multiple org, void: sml leuk, 30-50 wbc  3/8/18  Pvr: 21  (fill a nd pull)   3/2018  Pvr by I&O: 700cc   10/29/17 No cx, void:  Neg  6/2/16  E.coli  6/7/16  Pvr: 11cc  5/31/16 pvr by I&O:  460cc   2/24/15 No cx, void:neg  1/7/15  E.coli res to amp, cipro, lev, tetra, void: neg  10/9/14 No cx, void: neg  4/9/14  No c,x void: neg  3/15/10 Ng, void:  neg  11/6/08 Multiple org    psa history: MGF - had prostate cancer at a.84  10/15/19 pvr by scan: 0cc  8/12/19 Cysto trus 22.2g prostate with mod b lobe hypertrophy and no IV median lobe. pvr by aspiration: 60cc. Recommended urolift.   7/31/19 0.48  7/30/19 DARRYL: 30g, firm on right, pvr by scan: 30cc  7/18/19  pvr: 55cc. UF avg flow 2.9mL/s, voided volume: 37. Pvr: 63cc   10/16/18  pvr: 0  12/15/17 0.458  2016  0.31  2015  0.38  2014  0.5  1/16/12 0.56   1/5/11  0.36  1/6/10  0.30  2008  Negative prostate biopsy     Testosterone history: currently on 1.62% 3 pumps a day managed by pcp  12/15/17 400, psa 0.458 on 1.62% 3 pumps a day   3/26/15 161, 15.1/442.2, psa 0.38 on androgel 2 pumps a day  10/16/14 180  4/11/14 354  2012  androgel 2 pumps a day   1/15/19 177      REVIEW OF SYSTEMS:  General ROS: no fevers, no chills  Psychological ROS: no depression  Endocrine ROS: no heat or cold  Respiratory ROS: no SOB  Cardiovascular ROS: no CP  Gastrointestinal ROS: no abdominal pain, no constipation, no diarrhea, no BRBPR  Musculoskeletal ROS: no muscle pain  Neurological ROS: no headaches  Dermatological ROS: no rashes  HEENT: +glasses, + sinus   ROS: per HPI     PMHx:  Past Medical History:   Diagnosis Date    Allergy     Arthritis     BPH with obstruction/lower urinary tract symptoms     Environmental allergies     H/O prostatitis     History of urinary retention     Hypertension     Urinary tract infection    hypogonadism  Erectile dysfunction  Kidney stones: No    PSHx:  Past Surgical History:   Procedure Laterality Date    HERNIA REPAIR      INGUNAL     TRANSRECTAL ULTRASOUND EXAMINATION N/A 8/12/2019    Procedure:  TRANSRECTAL ULTRASOUND;  Surgeon: Latia Yoon MD;  Location: [facility] OR;  Service: Urology;  Laterality: N/A;    VASECTOMY      WISDOM TOOTH EXTRACTION     left IHr    Stents/Valves/Foreign Bodies: No  Cardiac Evaluation: . Cardiac cath 9/26/19  Intermediate stenosis of proximal LAD, referred for FFR. Uploaded to media 10/15/19  Colonoscopy: yes, twice, last one 9 years ago - due for one    Fam Hx:  MGF - had prostate cancer at a.84  No kidney stones    Soc Hx:  No tobacco.    No alcohol  Lives in [place]   :yes  Children: 3  Occupation:retired environmental science and navy officer    Allergies:  Codeine; Morphine; and Sulfa (sulfonamide antibiotics)   Sulfa     Urologic Medications:   Anticoagulation: Yes - asa 81mg daily    Objective:     Vitals:    10/15/19 1624   BP: 124/82   Pulse: 89     General:WDWN in NAD  Eyes: PERRLA, normal conjunctiva  Respiratory: no increased work on breathing, clear to auscultation  Cardiovascular: regular rate and rhythm. No obvious extremity edema.  GI: palpation of masses. No tenderness. No hepatosplenomegaly to palpation.  Musculoskeletal: normal range of motion of bilateral upper extremities. Normal muscle strength and tone.  Skin: no obvious rashes or lesions. No tightening of skin noted.  Neurologic: CN grossly normal. Normal sensation.   Psychiatric: awake, alert and oriented x 3. Mood and affect normal. Cooperative.     7/20/19  Inspection of anus and perineum normal  No scrotal rashes, cysts or lesions  Epididymis normal in size, no tenderness  Testes normal and size, equal size bilaterally, no masses  Urethral meatus normal without discharge  Penis is circumcised  DARRYL: 30, firm on right, boggy no nodules,no tenderness. SV not palpable. Normal sphincter tone. Nohemhorroids.  No bilateral inguinal hernias noted   [facility] scar        LABS REVIEW:  BMP  Lab Results   Component Value Date     03/06/2015    K 4.3 03/06/2015     03/06/2015     CO2 27 03/06/2015    BUN 19 03/06/2015    CREATININE 1.1 07/31/2019    CALCIUM 9.4 03/06/2015    ANIONGAP 8 03/06/2015    ESTGFRAFRICA >60 07/31/2019    EGFRNONAA >60 07/31/2019     Lab Results   Component Value Date    WBC 6.16 03/06/2015    HGB 15.1 03/06/2015    HCT 44.2 03/06/2015    MCV 93 03/06/2015     03/06/2015       PATHOLOGY REVIEW:  none    RADIOGRAPHIC REVIEW:  ctu 7/31/19- He appears to have a normal positioned left kidney.  It looks like he also has a benign small 1 cm adrenal lesion    The liver is of normal size and CT density.  Identified in the central right lobe is a 8.9 cm cyst.  This appears increased in size from the prior ultrasound when it measured 6.7 cm.  A 2nd small cyst measures 11 mm.  There are calcifications in the liver and spleen parenchyma consistent with prior granulomatous disease.  The gallbladder is of normal size without CT evidence of stone.  The pancreas is of normal contour and CT density without edema or mass.  The spleen is of normal size.    There is a 1.7 x 0.9 cm soft tissue density mass in the left adrenal bed a probable adenoma, less likely a lymph node.  This was not noted on the prior ultrasound.  Similar finding is not seen on the right.  The kidneys are of normal size contour and contrast enhancement without mass stone or hydronephrosis.  The ureters follow a normal course down to the bladder without dilation or stone.  The abdominal aorta contains atherosclerotic calcification without aneurysm.  Retroperitoneal adenopathy is not otherwise seen.    The stomach is of normal configuration.  Small bowel dilatation or air-fluid levels are not seen.  A normal appendix is seen.  There are several diverticuli noted in the sigmoid colon without CT evidence of diverticulitis.  No free fluid is noted.    The bladder is of normal contour without mass or asymmetry.  The prostate is not enlarged.  No free fluid is seen in the pelvis.    ctrss 9/1/18  RML clacified  granuloma  Left kd partially rotated  inocomplete emptying of bladder  Mild urinary bladder wall thickening         Assessment:       1. Benign prostatic hyperplasia, unspecified whether lower urinary tract symptoms present    2. Abnormal finding of blood chemistry, unspecified     3. Recurrent UTI          Plan:         Continue cialis 5mg daily  Return with a very full bladder for uroflow and pvr nurse visit  Check a1c with glucose in urine   He wants to see how he continues to do on cialis and if he feels like it's getting worse or slow flow despite cialis will send to my [facility] for urolift  Not large enough for TURP     Follow up in 6 months with pvr      Latia Yoon MD

## 2019-10-15 NOTE — PATIENT INSTRUCTIONS
Continue cialis 5mg daily  Return with a very full bladder for uroflow and pvr nurse visit  Check a1c with glucose in urine   He wants to see how he continues to do on cialis and if he feels like it's getting worse or slow flow despite cialis will send to [facility] for urolift  Not large enough for TURP   Follow up in 6 months Continue cialis 5mg daily  Return with a very full bladder for uroflow and pvr nurse visit  Check a1c with glucose in urine   He wants to see how he continues to do on cialis and if he feels like it's getting worse or slow flow despite cialis will send to [facility] for urolift  Not large enough for TURP     Follow up in 6 months with pvr

## 2019-10-15 NOTE — Clinical Note
Continue cialis 5mg dailyReturn with a very full bladder for uroflow and pvr nurse visitCheck a1c with glucose in urine He wants to see how he continues to do on cialis and if he feels like it's getting worse or slow flow despite cialis will send to my [facility] for uroliftNot large enough for TURP Follow up in 6 months with pvr

## 2019-10-21 ENCOUNTER — OFFICE VISIT (OUTPATIENT)
Dept: ORTHOPEDICS | Facility: CLINIC | Age: 74
End: 2019-10-21
Payer: MEDICARE

## 2019-10-21 ENCOUNTER — LAB VISIT (OUTPATIENT)
Dept: LAB | Facility: HOSPITAL | Age: 74
End: 2019-10-21
Attending: UROLOGY
Payer: MEDICARE

## 2019-10-21 ENCOUNTER — CLINICAL SUPPORT (OUTPATIENT)
Dept: UROLOGY | Facility: CLINIC | Age: 74
End: 2019-10-21
Payer: MEDICARE

## 2019-10-21 DIAGNOSIS — N40.0 BENIGN PROSTATIC HYPERPLASIA, UNSPECIFIED WHETHER LOWER URINARY TRACT SYMPTOMS PRESENT: ICD-10-CM

## 2019-10-21 DIAGNOSIS — N40.0 BENIGN PROSTATIC HYPERPLASIA, UNSPECIFIED WHETHER LOWER URINARY TRACT SYMPTOMS PRESENT: Primary | ICD-10-CM

## 2019-10-21 DIAGNOSIS — R79.9 ABNORMAL FINDING OF BLOOD CHEMISTRY, UNSPECIFIED: ICD-10-CM

## 2019-10-21 DIAGNOSIS — M17.0 PRIMARY OSTEOARTHRITIS OF BOTH KNEES: Primary | ICD-10-CM

## 2019-10-21 LAB
ESTIMATED AVG GLUCOSE: 114 MG/DL (ref 68–131)
HBA1C MFR BLD HPLC: 5.6 % (ref 4–5.6)

## 2019-10-21 PROCEDURE — 99499 NO LOS: ICD-10-PCS | Mod: S$PBB,,, | Performed by: UROLOGY

## 2019-10-21 PROCEDURE — 83036 HEMOGLOBIN GLYCOSYLATED A1C: CPT

## 2019-10-21 PROCEDURE — 99499 NO LOS: ICD-10-PCS | Mod: S$PBB,,, | Performed by: ORTHOPAEDIC SURGERY

## 2019-10-21 PROCEDURE — 51736 URINE FLOW MEASUREMENT: CPT | Mod: PBBFAC,PN | Performed by: UROLOGY

## 2019-10-21 PROCEDURE — 36415 COLL VENOUS BLD VENIPUNCTURE: CPT

## 2019-10-21 PROCEDURE — 99212 OFFICE O/P EST SF 10 MIN: CPT | Mod: PBBFAC,PN,25 | Performed by: ORTHOPAEDIC SURGERY

## 2019-10-21 PROCEDURE — 99999 PR PBB SHADOW E&M-EST. PATIENT-LVL II: CPT | Mod: PBBFAC,,, | Performed by: ORTHOPAEDIC SURGERY

## 2019-10-21 PROCEDURE — 51736 URINE FLOW MEASUREMENT: CPT | Mod: 26,S$PBB,, | Performed by: UROLOGY

## 2019-10-21 PROCEDURE — 51736 PR SIMPLE UROFLOWMETRY: ICD-10-PCS | Mod: 26,S$PBB,, | Performed by: UROLOGY

## 2019-10-21 PROCEDURE — 20610 DRAIN/INJ JOINT/BURSA W/O US: CPT | Mod: 50,PBBFAC,PN | Performed by: ORTHOPAEDIC SURGERY

## 2019-10-21 PROCEDURE — 99499 UNLISTED E&M SERVICE: CPT | Mod: S$PBB,,, | Performed by: UROLOGY

## 2019-10-21 PROCEDURE — 20610 LARGE JOINT ASPIRATION/INJECTION: R KNEE, L KNEE: ICD-10-PCS | Mod: 50,S$PBB,, | Performed by: ORTHOPAEDIC SURGERY

## 2019-10-21 PROCEDURE — 99999 PR PBB SHADOW E&M-EST. PATIENT-LVL II: ICD-10-PCS | Mod: PBBFAC,,, | Performed by: ORTHOPAEDIC SURGERY

## 2019-10-21 PROCEDURE — 99499 UNLISTED E&M SERVICE: CPT | Mod: S$PBB,,, | Performed by: ORTHOPAEDIC SURGERY

## 2019-10-21 RX ADMIN — Medication 15 MG: at 08:10

## 2019-10-21 NOTE — PROGRESS NOTES
Past Medical History:   Diagnosis Date    Allergy     Arthritis     BPH with obstruction/lower urinary tract symptoms     Environmental allergies     H/O prostatitis     History of urinary retention     Hypertension     Urinary tract infection        Past Surgical History:   Procedure Laterality Date    HERNIA REPAIR      INGUNAL     TRANSRECTAL ULTRASOUND EXAMINATION N/A 8/12/2019    Procedure: TRANSRECTAL ULTRASOUND;  Surgeon: Latia Yoon MD;  Location: [facility];  Service: Urology;  Laterality: N/A;    VASECTOMY      WISDOM TOOTH EXTRACTION         Current Outpatient Medications   Medication Sig    ANDROGEL 20.25 mg/1.25 gram (1.62 %) GlPm     azelastine (ASTELIN) 137 mcg nasal spray 2 sprays (274 mcg total) by Nasal route 2 (two) times daily.    CRESTOR 10 mg tablet     diclofenac sodium (VOLTAREN) 1 % Gel     ezetimibe (ZETIA) 10 mg tablet     fluocinolone (DERMA-SMOOTHE) 0.01 % external oil Apply topically 3 (three) times daily. Apply to damp scalp at bedtime, wash off qam, avoid chronic use.    fluticasone propionate (FLONASE) 50 mcg/actuation nasal spray     losartan (COZAAR) 100 MG tablet     montelukast (SINGULAIR) 10 mg tablet TAKE 1 TABLET DAILY    omeprazole (PRILOSEC) 10 MG capsule Take 10 mg by mouth once daily.    ranitidine (ZANTAC) 150 MG tablet     tadalafil (CIALIS) 5 MG tablet Take 1 tablet (5 mg total) by mouth once daily.    triamcinolone acetonide 0.1% (KENALOG) 0.1 % cream aaa bid x 2 weeks then prn, avoid chronic use     No current facility-administered medications for this visit.        Review of patient's allergies indicates:   Allergen Reactions    Codeine Hives    Morphine Hives    Sulfa (sulfonamide antibiotics) Hives and Itching       Family History   Problem Relation Age of Onset    Dementia Mother     Cancer Father         skin    Heart disease Father     Allergic rhinitis Neg Hx     Allergies Neg Hx     Angioedema Neg Hx     Asthma Neg  Hx     Atopy Neg Hx     Eczema Neg Hx     Immunodeficiency Neg Hx     Rhinitis Neg Hx     Urticaria Neg Hx        Social History     Socioeconomic History    Marital status:      Spouse name: Not on file    Number of children: Not on file    Years of education: Not on file    Highest education level: Not on file   Occupational History    Not on file   Social Needs    Financial resource strain: Not on file    Food insecurity:     Worry: Not on file     Inability: Not on file    Transportation needs:     Medical: Not on file     Non-medical: Not on file   Tobacco Use    Smoking status: Former Smoker     Packs/day: 1.00     Years: 10.00     Pack years: 10.00     Last attempt to quit: 4/3/1974     Years since quittin.5    Smokeless tobacco: Never Used   Substance and Sexual Activity    Alcohol use: No    Drug use: No    Sexual activity: Not on file   Lifestyle    Physical activity:     Days per week: Not on file     Minutes per session: Not on file    Stress: Not on file   Relationships    Social connections:     Talks on phone: Not on file     Gets together: Not on file     Attends [employer] service: Not on file     Active member of club or organization: Not on file     Attends meetings of clubs or organizations: Not on file     Relationship status: Not on file   Other Topics Concern    Not on file   Social History Narrative    Not on file       Chief Complaint:   Chief Complaint   Patient presents with    Knee Pain     B orthovisc 1/3       History of present illness:  73-year-old male with a chronic bilateral knee arthritis.  He has tried cortisone injections previously.  He has had hyaluronic acid series in the past.  Patient has had a reaction with both Synvisc and Euflexxa.  We have been doing Orthovisc on him for a few years now with good success.  Knee pain has returned.  Last series was 6 months ago.      Review of Systems:    Constitution: Negative for chills, fever, and  sweats.  Negative for unexplained weight loss.    HENT:  Negative for headaches and blurry vision.    Cardiovascular:Negative for chest pain or irregular heart beat. Negative for hypertension.    Respiratory:  Negative for cough and shortness of breath.    Gastrointestinal: Negative for abdominal pain, heartburn, melena, nausea, and vomitting.    Genitourinary:  Negative bladder incontinence and dysuria.    Musculoskeletal:  See HPI    Neurological: Negative for numbness.    Psychiatric/Behavioral: Negative for depression.  The patient is not nervous/anxious.      Endocrine: Negative for polyuria    Hematologic/Lymphatic: Negative for bleeding problem.  Does not bruise/bleed easily.    Skin: Negative for poor would healing and rash      Physical Examination:    Vital Signs:  There were no vitals filed for this visit.    There is no height or weight on file to calculate BMI.    This a well-developed, well nourished patient in no acute distress.  They are alert and oriented and cooperative to examination.  Pt. walks without an antalgic gait.      Examination of bilateral knees shows no rashes or erythema. There are no masses ecchymosis or effusion. Patient has full range of motion from 0-130°. Patient is nontender to palpation over lateral joint line and nontender to palpation over the medial joint line. Patient has a - Lachman exam, - anterior drawer exam, and - posterior drawer exam. - Juliana's exam. Knee is stable to varus and valgus stress. 5 out of 5 motor strength. Palpable distal pulses. Intact light touch sensation. Negative Patellofemoral crepitus      X-rays:  None today     Assessment::  Bilateral knee arthritis    Plan:  I injected both knees with Orthovisc 1 of 3.  Follow up in 1 week.    This note was created using [employer] voice recognition software that occasionally misinterpreted phrases or words.    Consult note is delivered via Epic messaging service.

## 2019-10-21 NOTE — PROCEDURES
Large Joint Aspiration/Injection: R knee, L knee  Date/Time: 10/21/2019 8:45 AM  Performed by: Max Garcia MD  Authorized by: Max Garcia MD     Consent Done?:  Yes (Verbal)  Indications:  Pain  Procedure site marked: Yes    Timeout: Prior to procedure the correct patient, procedure, and site was verified      Location:  Knee  Site:  R knee and L knee  Prep: Patient was prepped and draped in usual sterile fashion    Needle size:  20 G  Approach:  Anterolateral  Medications:  15 mg sodium hyaluronate (orthovisc) 30 mg/2 mL; 15 mg sodium hyaluronate (orthovisc) 30 mg/2 mL  Patient tolerance:  Patient tolerated the procedure well with no immediate complications

## 2019-10-21 NOTE — PROGRESS NOTES
Per  patient in clinic today for uroflow and pvr.    Uroflow results (date: 10/21/2019) on  :   Voiding time: 29.5s,   Flow time: 29.1s,   TTP flow: 9.4s,   Peak flowrate: 20.7 mL/s,   Average flowrate: 11.0mL/s,   Intervals: 2,    Voided volume: 320 mL,    Pvr by bladder scan: 56.   Pattern of curve: to be determined by physician.

## 2019-10-22 ENCOUNTER — TELEPHONE (OUTPATIENT)
Dept: UROLOGY | Facility: CLINIC | Age: 74
End: 2019-10-22

## 2019-10-22 NOTE — PROGRESS NOTES
Uroflow results (date: 10/21/2019) on cialis 5mg daily :   Voiding time: 29.5s,   Flow time: 29.1s,   TTP flow: 9.4s,   Peak flowrate: 20.7 mL/s,   Average flowrate: 11.0mL/s,   Intervals: 2,    Voided volume: 320 mL,    Pvr by bladder scan: 56.   Pattern of curve: bell curve, normal UDS      Uroflow results reviewed and interpreted by me ()

## 2019-10-22 NOTE — TELEPHONE ENCOUNTER
----- Message from Armida Donnelly sent at 10/22/2019  8:15 AM CDT -----  Contact: self  Placed call to pod, patient miss call from your office please call back at [phone] ([email or web address])

## 2019-10-23 ENCOUNTER — TELEPHONE (OUTPATIENT)
Dept: ORTHOPEDICS | Facility: CLINIC | Age: 74
End: 2019-10-23

## 2019-10-23 NOTE — TELEPHONE ENCOUNTER
----- Message from Manpreet Pacheco sent at 10/23/2019  3:09 PM CDT -----  Contact: pt  Needs Injections [facility], had first on 10 -21, [phone]

## 2019-10-23 NOTE — TELEPHONE ENCOUNTER
----- Message from Manpreet Pacheco sent at 10/23/2019  3:09 PM CDT -----  Contact: pt  Needs Injections [facility], had first on 10 -21, [phone]

## 2019-10-30 ENCOUNTER — OFFICE VISIT (OUTPATIENT)
Dept: ORTHOPEDICS | Facility: CLINIC | Age: 74
End: 2019-10-30
Payer: MEDICARE

## 2019-10-30 VITALS — WEIGHT: 246 LBS | BODY MASS INDEX: 35.22 KG/M2 | HEIGHT: 70 IN

## 2019-10-30 DIAGNOSIS — M17.0 PRIMARY OSTEOARTHRITIS OF BOTH KNEES: Primary | ICD-10-CM

## 2019-10-30 PROCEDURE — 20610 DRAIN/INJ JOINT/BURSA W/O US: CPT | Mod: 50,PBBFAC,PN | Performed by: ORTHOPAEDIC SURGERY

## 2019-10-30 PROCEDURE — 99499 UNLISTED E&M SERVICE: CPT | Mod: S$PBB,,, | Performed by: ORTHOPAEDIC SURGERY

## 2019-10-30 PROCEDURE — 99212 OFFICE O/P EST SF 10 MIN: CPT | Mod: PBBFAC,PN | Performed by: ORTHOPAEDIC SURGERY

## 2019-10-30 PROCEDURE — 99499 NO LOS: ICD-10-PCS | Mod: S$PBB,,, | Performed by: ORTHOPAEDIC SURGERY

## 2019-10-30 PROCEDURE — 99999 PR PBB SHADOW E&M-EST. PATIENT-LVL II: CPT | Mod: PBBFAC,,, | Performed by: ORTHOPAEDIC SURGERY

## 2019-10-30 PROCEDURE — 20610 LARGE JOINT ASPIRATION/INJECTION: R KNEE, L KNEE: ICD-10-PCS | Mod: 50,S$PBB,, | Performed by: ORTHOPAEDIC SURGERY

## 2019-10-30 PROCEDURE — 99999 PR PBB SHADOW E&M-EST. PATIENT-LVL II: ICD-10-PCS | Mod: PBBFAC,,, | Performed by: ORTHOPAEDIC SURGERY

## 2019-10-30 RX ADMIN — Medication 15 MG: at 11:10

## 2019-10-30 NOTE — PROGRESS NOTES
Past Medical History:   Diagnosis Date    Allergy     Arthritis     BPH with obstruction/lower urinary tract symptoms     Environmental allergies     H/O prostatitis     History of urinary retention     Hypertension     Urinary tract infection        Past Surgical History:   Procedure Laterality Date    HERNIA REPAIR      INGUNAL     TRANSRECTAL ULTRASOUND EXAMINATION N/A 8/12/2019    Procedure: TRANSRECTAL ULTRASOUND;  Surgeon: Latia Yoon MD;  Location: [facility];  Service: Urology;  Laterality: N/A;    VASECTOMY      WISDOM TOOTH EXTRACTION         Current Outpatient Medications   Medication Sig    ANDROGEL 20.25 mg/1.25 gram (1.62 %) GlPm     azelastine (ASTELIN) 137 mcg nasal spray 2 sprays (274 mcg total) by Nasal route 2 (two) times daily.    CRESTOR 10 mg tablet     diclofenac sodium (VOLTAREN) 1 % Gel     ezetimibe (ZETIA) 10 mg tablet     fluocinolone (DERMA-SMOOTHE) 0.01 % external oil Apply topically 3 (three) times daily. Apply to damp scalp at bedtime, wash off qam, avoid chronic use.    fluticasone propionate (FLONASE) 50 mcg/actuation nasal spray     losartan (COZAAR) 100 MG tablet     montelukast (SINGULAIR) 10 mg tablet TAKE 1 TABLET DAILY    omeprazole (PRILOSEC) 10 MG capsule Take 10 mg by mouth once daily.    ranitidine (ZANTAC) 150 MG tablet     tadalafil (CIALIS) 5 MG tablet Take 1 tablet (5 mg total) by mouth once daily.    triamcinolone acetonide 0.1% (KENALOG) 0.1 % cream aaa bid x 2 weeks then prn, avoid chronic use     No current facility-administered medications for this visit.        Review of patient's allergies indicates:   Allergen Reactions    Codeine Hives    Morphine Hives    Sulfa (sulfonamide antibiotics) Hives and Itching       Family History   Problem Relation Age of Onset    Dementia Mother     Cancer Father         skin    Heart disease Father     Allergic rhinitis Neg Hx     Allergies Neg Hx     Angioedema Neg Hx     Asthma Neg  Hx     Atopy Neg Hx     Eczema Neg Hx     Immunodeficiency Neg Hx     Rhinitis Neg Hx     Urticaria Neg Hx        Social History     Socioeconomic History    Marital status:      Spouse name: Not on file    Number of children: Not on file    Years of education: Not on file    Highest education level: Not on file   Occupational History    Not on file   Social Needs    Financial resource strain: Not on file    Food insecurity:     Worry: Not on file     Inability: Not on file    Transportation needs:     Medical: Not on file     Non-medical: Not on file   Tobacco Use    Smoking status: Former Smoker     Packs/day: 1.00     Years: 10.00     Pack years: 10.00     Last attempt to quit: 4/3/1974     Years since quittin.6    Smokeless tobacco: Never Used   Substance and Sexual Activity    Alcohol use: No    Drug use: No    Sexual activity: Not on file   Lifestyle    Physical activity:     Days per week: Not on file     Minutes per session: Not on file    Stress: Not on file   Relationships    Social connections:     Talks on phone: Not on file     Gets together: Not on file     Attends [employer] service: Not on file     Active member of club or organization: Not on file     Attends meetings of clubs or organizations: Not on file     Relationship status: Not on file   Other Topics Concern    Not on file   Social History Narrative    Not on file       Chief Complaint:   Chief Complaint   Patient presents with    Knee Pain     bilateral orthovisc 2/3        History of present illness:  74-year-old male with a chronic bilateral knee arthritis.  He has tried cortisone injections previously.  He has had hyaluronic acid series in the past.  Patient has had a reaction with both Synvisc and Euflexxa.  We have been doing Orthovisc on him for a few years now with good success.  Knee pain has returned.  Last series was 6 months ago.      Review of Systems:    Constitution: Negative for chills,  fever, and sweats.  Negative for unexplained weight loss.    HENT:  Negative for headaches and blurry vision.    Cardiovascular:Negative for chest pain or irregular heart beat. Negative for hypertension.    Respiratory:  Negative for cough and shortness of breath.    Gastrointestinal: Negative for abdominal pain, heartburn, melena, nausea, and vomitting.    Genitourinary:  Negative bladder incontinence and dysuria.    Musculoskeletal:  See HPI    Neurological: Negative for numbness.    Psychiatric/Behavioral: Negative for depression.  The patient is not nervous/anxious.      Endocrine: Negative for polyuria    Hematologic/Lymphatic: Negative for bleeding problem.  Does not bruise/bleed easily.    Skin: Negative for poor would healing and rash      Physical Examination:    Vital Signs:  There were no vitals filed for this visit.    Body mass index is 35.3 kg/m².    This a well-developed, well nourished patient in no acute distress.  They are alert and oriented and cooperative to examination.  Pt. walks without an antalgic gait.      Examination of bilateral knees shows no rashes or erythema. There are no masses ecchymosis or effusion. Patient has full range of motion from 0-130°. Patient is nontender to palpation over lateral joint line and nontender to palpation over the medial joint line. Patient has a - Lachman exam, - anterior drawer exam, and - posterior drawer exam. - Juliana's exam. Knee is stable to varus and valgus stress. 5 out of 5 motor strength. Palpable distal pulses. Intact light touch sensation. Negative Patellofemoral crepitus      X-rays:  None today     Assessment::  Bilateral knee arthritis    Plan:  I injected both knees with Orthovisc 2 of 3.  Follow up in 1 week.    This note was created using [employer] voice recognition software that occasionally misinterpreted phrases or words.    Consult note is delivered via Epic messaging service.

## 2019-10-30 NOTE — PROCEDURES
Large Joint Aspiration/Injection: R knee, L knee  Date/Time: 10/30/2019 11:15 AM  Performed by: Max Garcia MD  Authorized by: Max Garcia MD     Consent Done?:  Yes (Verbal)  Indications:  Pain  Procedure site marked: Yes    Timeout: Prior to procedure the correct patient, procedure, and site was verified      Location:  Knee  Site:  R knee and L knee  Prep: Patient was prepped and draped in usual sterile fashion    Needle size:  20 G  Approach:  Anterolateral  Medications:  15 mg sodium hyaluronate (orthovisc) 30 mg/2 mL; 15 mg sodium hyaluronate (orthovisc) 30 mg/2 mL  Patient tolerance:  Patient tolerated the procedure well with no immediate complications

## 2019-11-07 ENCOUNTER — OFFICE VISIT (OUTPATIENT)
Dept: ORTHOPEDICS | Facility: CLINIC | Age: 74
End: 2019-11-07
Payer: MEDICARE

## 2019-11-07 VITALS — BODY MASS INDEX: 35.22 KG/M2 | HEIGHT: 70 IN | WEIGHT: 246 LBS

## 2019-11-07 DIAGNOSIS — M17.0 PRIMARY OSTEOARTHRITIS OF BOTH KNEES: Primary | ICD-10-CM

## 2019-11-07 PROCEDURE — 99499 NO LOS: ICD-10-PCS | Mod: S$PBB,,, | Performed by: ORTHOPAEDIC SURGERY

## 2019-11-07 PROCEDURE — 99212 OFFICE O/P EST SF 10 MIN: CPT | Mod: PBBFAC,PN | Performed by: ORTHOPAEDIC SURGERY

## 2019-11-07 PROCEDURE — 20610 DRAIN/INJ JOINT/BURSA W/O US: CPT | Mod: 50,PBBFAC,PN | Performed by: ORTHOPAEDIC SURGERY

## 2019-11-07 PROCEDURE — 99499 UNLISTED E&M SERVICE: CPT | Mod: S$PBB,,, | Performed by: ORTHOPAEDIC SURGERY

## 2019-11-07 PROCEDURE — 99999 PR PBB SHADOW E&M-EST. PATIENT-LVL II: ICD-10-PCS | Mod: PBBFAC,,, | Performed by: ORTHOPAEDIC SURGERY

## 2019-11-07 PROCEDURE — 99999 PR PBB SHADOW E&M-EST. PATIENT-LVL II: CPT | Mod: PBBFAC,,, | Performed by: ORTHOPAEDIC SURGERY

## 2019-11-07 PROCEDURE — 20610 LARGE JOINT ASPIRATION/INJECTION: R KNEE, L KNEE: ICD-10-PCS | Mod: 50,S$PBB,, | Performed by: ORTHOPAEDIC SURGERY

## 2019-11-07 RX ADMIN — Medication 15 MG: at 10:11

## 2019-11-07 NOTE — PROCEDURES
Large Joint Aspiration/Injection: R knee, L knee  Date/Time: 11/7/2019 10:00 AM  Performed by: Max Garcia MD  Authorized by: Max Garcia MD     Consent Done?:  Yes (Verbal)  Indications:  Pain  Procedure site marked: Yes    Timeout: Prior to procedure the correct patient, procedure, and site was verified      Location:  Knee  Site:  R knee and L knee  Prep: Patient was prepped and draped in usual sterile fashion    Needle size:  20 G  Approach:  Anterolateral  Medications:  15 mg sodium hyaluronate (orthovisc) 30 mg/2 mL; 15 mg sodium hyaluronate (orthovisc) 30 mg/2 mL  Patient tolerance:  Patient tolerated the procedure well with no immediate complications

## 2019-11-07 NOTE — PROGRESS NOTES
Past Medical History:   Diagnosis Date    Allergy     Arthritis     BPH with obstruction/lower urinary tract symptoms     Environmental allergies     H/O prostatitis     History of urinary retention     Hypertension     Urinary tract infection        Past Surgical History:   Procedure Laterality Date    HERNIA REPAIR      INGUNAL     TRANSRECTAL ULTRASOUND EXAMINATION N/A 8/12/2019    Procedure: TRANSRECTAL ULTRASOUND;  Surgeon: Latia Yoon MD;  Location: [facility];  Service: Urology;  Laterality: N/A;    VASECTOMY      WISDOM TOOTH EXTRACTION         Current Outpatient Medications   Medication Sig    ANDROGEL 20.25 mg/1.25 gram (1.62 %) GlPm     azelastine (ASTELIN) 137 mcg nasal spray 2 sprays (274 mcg total) by Nasal route 2 (two) times daily.    CRESTOR 10 mg tablet     diclofenac sodium (VOLTAREN) 1 % Gel     ezetimibe (ZETIA) 10 mg tablet     fluocinolone (DERMA-SMOOTHE) 0.01 % external oil Apply topically 3 (three) times daily. Apply to damp scalp at bedtime, wash off qam, avoid chronic use.    fluticasone propionate (FLONASE) 50 mcg/actuation nasal spray     losartan (COZAAR) 100 MG tablet     montelukast (SINGULAIR) 10 mg tablet TAKE 1 TABLET DAILY    omeprazole (PRILOSEC) 10 MG capsule Take 10 mg by mouth once daily.    ranitidine (ZANTAC) 150 MG tablet     tadalafil (CIALIS) 5 MG tablet Take 1 tablet (5 mg total) by mouth once daily.    triamcinolone acetonide 0.1% (KENALOG) 0.1 % cream aaa bid x 2 weeks then prn, avoid chronic use     No current facility-administered medications for this visit.        Review of patient's allergies indicates:   Allergen Reactions    Codeine Hives    Morphine Hives    Sulfa (sulfonamide antibiotics) Hives and Itching       Family History   Problem Relation Age of Onset    Dementia Mother     Cancer Father         skin    Heart disease Father     Allergic rhinitis Neg Hx     Allergies Neg Hx     Angioedema Neg Hx     Asthma Neg  Hx     Atopy Neg Hx     Eczema Neg Hx     Immunodeficiency Neg Hx     Rhinitis Neg Hx     Urticaria Neg Hx        Social History     Socioeconomic History    Marital status:      Spouse name: Not on file    Number of children: Not on file    Years of education: Not on file    Highest education level: Not on file   Occupational History    Not on file   Social Needs    Financial resource strain: Not on file    Food insecurity:     Worry: Not on file     Inability: Not on file    Transportation needs:     Medical: Not on file     Non-medical: Not on file   Tobacco Use    Smoking status: Former Smoker     Packs/day: 1.00     Years: 10.00     Pack years: 10.00     Last attempt to quit: 4/3/1974     Years since quittin.6    Smokeless tobacco: Never Used   Substance and Sexual Activity    Alcohol use: No    Drug use: No    Sexual activity: Not on file   Lifestyle    Physical activity:     Days per week: Not on file     Minutes per session: Not on file    Stress: Not on file   Relationships    Social connections:     Talks on phone: Not on file     Gets together: Not on file     Attends [employer] service: Not on file     Active member of club or organization: Not on file     Attends meetings of clubs or organizations: Not on file     Relationship status: Not on file   Other Topics Concern    Not on file   Social History Narrative    Not on file       Chief Complaint:   Chief Complaint   Patient presents with    Knee Pain     bilateral knee-orthovisc 3/3        History of present illness:  74-year-old male with a chronic bilateral knee arthritis.  He has tried cortisone injections previously.  He has had hyaluronic acid series in the past.  Patient has had a reaction with both Synvisc and Euflexxa.  We have been doing Orthovisc on him for a few years now with good success.  Knee pain has returned.  Last series was 6 months ago.      Review of Systems:    Musculoskeletal:  See  HPI        Physical Examination:    Vital Signs:  There were no vitals filed for this visit.    Body mass index is 35.3 kg/m².    This a well-developed, well nourished patient in no acute distress.  They are alert and oriented and cooperative to examination.  Pt. walks without an antalgic gait.      Examination of bilateral knees shows no rashes or erythema. There are no masses ecchymosis or effusion. Patient has full range of motion from 0-130°. Patient is nontender to palpation over lateral joint line and nontender to palpation over the medial joint line.Knee is stable to varus and valgus stress. 5 out of 5 motor strength. Palpable distal pulses. Intact light touch sensation. Negative Patellofemoral crepitus      X-rays:  None      Assessment::  Bilateral knee arthritis    Plan:  I injected both knees with Orthovisc 3 of 3.  Follow up in 6 months.    This note was created using M Modal voice recognition software that occasionally misinterpreted phrases or words.    Consult note is delivered via Epic messaging service.

## 2020-01-24 DIAGNOSIS — M25.562 PAIN IN BOTH KNEES, UNSPECIFIED CHRONICITY: Primary | ICD-10-CM

## 2020-01-24 DIAGNOSIS — M25.561 PAIN IN BOTH KNEES, UNSPECIFIED CHRONICITY: Primary | ICD-10-CM

## 2020-01-27 ENCOUNTER — HOSPITAL ENCOUNTER (OUTPATIENT)
Dept: RADIOLOGY | Facility: HOSPITAL | Age: 75
Discharge: HOME | End: 2020-01-27
Attending: ORTHOPAEDIC SURGERY
Payer: MEDICARE

## 2020-01-27 ENCOUNTER — OFFICE VISIT (OUTPATIENT)
Dept: ORTHOPEDICS | Facility: CLINIC | Age: 75
End: 2020-01-27
Payer: MEDICARE

## 2020-01-27 VITALS
WEIGHT: 246 LBS | SYSTOLIC BLOOD PRESSURE: 134 MMHG | HEART RATE: 77 BPM | DIASTOLIC BLOOD PRESSURE: 77 MMHG | HEIGHT: 70 IN | BODY MASS INDEX: 35.22 KG/M2

## 2020-01-27 DIAGNOSIS — M25.561 PAIN IN BOTH KNEES, UNSPECIFIED CHRONICITY: ICD-10-CM

## 2020-01-27 DIAGNOSIS — M25.562 PAIN IN BOTH KNEES, UNSPECIFIED CHRONICITY: ICD-10-CM

## 2020-01-27 DIAGNOSIS — Z01.818 PRE-OP TESTING: ICD-10-CM

## 2020-01-27 DIAGNOSIS — M17.0 PRIMARY OSTEOARTHRITIS OF BOTH KNEES: Primary | ICD-10-CM

## 2020-01-27 PROCEDURE — 73564 X-RAY EXAM KNEE 4 OR MORE: CPT | Mod: TC,50,PN

## 2020-01-27 PROCEDURE — 99999 PR PBB SHADOW E&M-EST. PATIENT-LVL III: ICD-10-PCS | Mod: PBBFAC,,, | Performed by: ORTHOPAEDIC SURGERY

## 2020-01-27 PROCEDURE — 73564 XR KNEE ORTHO BILAT WITH FLEXION: ICD-10-PCS | Mod: 26,50,, | Performed by: RADIOLOGY

## 2020-01-27 PROCEDURE — 1159F MED LIST DOCD IN RCRD: CPT | Mod: ,,, | Performed by: ORTHOPAEDIC SURGERY

## 2020-01-27 PROCEDURE — 99999 PR PBB SHADOW E&M-EST. PATIENT-LVL III: CPT | Mod: PBBFAC,,, | Performed by: ORTHOPAEDIC SURGERY

## 2020-01-27 PROCEDURE — 1126F AMNT PAIN NOTED NONE PRSNT: CPT | Mod: ,,, | Performed by: ORTHOPAEDIC SURGERY

## 2020-01-27 PROCEDURE — 1126F PR PAIN SEVERITY QUANTIFIED, NO PAIN PRESENT: ICD-10-PCS | Mod: ,,, | Performed by: ORTHOPAEDIC SURGERY

## 2020-01-27 PROCEDURE — 73564 X-RAY EXAM KNEE 4 OR MORE: CPT | Mod: 26,50,, | Performed by: RADIOLOGY

## 2020-01-27 PROCEDURE — 1159F PR MEDICATION LIST DOCUMENTED IN MEDICAL RECORD: ICD-10-PCS | Mod: ,,, | Performed by: ORTHOPAEDIC SURGERY

## 2020-01-27 PROCEDURE — 99213 OFFICE O/P EST LOW 20 MIN: CPT | Mod: PBBFAC,25,PN | Performed by: ORTHOPAEDIC SURGERY

## 2020-01-27 PROCEDURE — 99214 PR OFFICE/OUTPT VISIT, EST, LEVL IV, 30-39 MIN: ICD-10-PCS | Mod: S$PBB,,, | Performed by: ORTHOPAEDIC SURGERY

## 2020-01-27 PROCEDURE — 99214 OFFICE O/P EST MOD 30 MIN: CPT | Mod: S$PBB,,, | Performed by: ORTHOPAEDIC SURGERY

## 2020-01-27 NOTE — PROGRESS NOTES
Past Medical History:   Diagnosis Date    Allergy     Arthritis     BPH with obstruction/lower urinary tract symptoms     Environmental allergies     H/O prostatitis     History of urinary retention     Hypertension     Urinary tract infection        Past Surgical History:   Procedure Laterality Date    HERNIA REPAIR      INGUNAL     TRANSRECTAL ULTRASOUND EXAMINATION N/A 8/12/2019    Procedure: TRANSRECTAL ULTRASOUND;  Surgeon: Latia Yoon MD;  Location: [facility];  Service: Urology;  Laterality: N/A;    VASECTOMY      WISDOM TOOTH EXTRACTION         Current Outpatient Medications   Medication Sig    ANDROGEL 20.25 mg/1.25 gram (1.62 %) GlPm     azelastine (ASTELIN) 137 mcg nasal spray 2 sprays (274 mcg total) by Nasal route 2 (two) times daily.    CRESTOR 10 mg tablet     diclofenac sodium (VOLTAREN) 1 % Gel     ezetimibe (ZETIA) 10 mg tablet     fluocinolone (DERMA-SMOOTHE) 0.01 % external oil Apply topically 3 (three) times daily. Apply to damp scalp at bedtime, wash off qam, avoid chronic use.    fluticasone propionate (FLONASE) 50 mcg/actuation nasal spray     losartan (COZAAR) 100 MG tablet     montelukast (SINGULAIR) 10 mg tablet TAKE 1 TABLET DAILY    omeprazole (PRILOSEC) 10 MG capsule Take 10 mg by mouth once daily.    ranitidine (ZANTAC) 150 MG tablet     tadalafil (CIALIS) 5 MG tablet Take 1 tablet (5 mg total) by mouth once daily.    triamcinolone acetonide 0.1% (KENALOG) 0.1 % cream aaa bid x 2 weeks then prn, avoid chronic use     No current facility-administered medications for this visit.        Review of patient's allergies indicates:   Allergen Reactions    Codeine Hives    Morphine Hives    Sulfa (sulfonamide antibiotics) Hives and Itching       Family History   Problem Relation Age of Onset    Dementia Mother     Cancer Father         skin    Heart disease Father     Allergic rhinitis Neg Hx     Allergies Neg Hx     Angioedema Neg Hx     Asthma Neg  Hx     Atopy Neg Hx     Eczema Neg Hx     Immunodeficiency Neg Hx     Rhinitis Neg Hx     Urticaria Neg Hx        Social History     Socioeconomic History    Marital status:      Spouse name: Not on file    Number of children: Not on file    Years of education: Not on file    Highest education level: Not on file   Occupational History    Not on file   Social Needs    Financial resource strain: Not on file    Food insecurity:     Worry: Not on file     Inability: Not on file    Transportation needs:     Medical: Not on file     Non-medical: Not on file   Tobacco Use    Smoking status: Former Smoker     Packs/day: 1.00     Years: 10.00     Pack years: 10.00     Last attempt to quit: 4/3/1974     Years since quittin.8    Smokeless tobacco: Never Used   Substance and Sexual Activity    Alcohol use: No    Drug use: No    Sexual activity: Not on file   Lifestyle    Physical activity:     Days per week: Not on file     Minutes per session: Not on file    Stress: Not on file   Relationships    Social connections:     Talks on phone: Not on file     Gets together: Not on file     Attends Cheondo[employer] service: Not on file     Active member of club or organization: Not on file     Attends meetings of clubs or organizations: Not on file     Relationship status: Not on file   Other Topics Concern    Not on file   Social History Narrative    Not on file       Chief Complaint:   Chief Complaint   Patient presents with    Knee Pain     bilateral knee pain       History of present illness:  74-year-old male with a chronic bilateral knee arthritis.  He has tried cortisone injections previously.  He has had hyaluronic acid series in the past.  Patient has had a reaction with both Synvisc and Euflexxa.  We have been doing Orthovisc on him for a few years now with good success.  Knee pain has returned.  The last injections did not help as much as previously.  Left knee feels like it wants to give out at  times. Has trouble going up and down steps now.      Review of Systems:    Musculoskeletal:  See HPI        Physical Examination:    Vital Signs:    Vitals:    01/27/20 0936   BP: 134/77   Pulse: 77       Body mass index is 35.3 kg/m².    This a well-developed, well nourished patient in no acute distress.  They are alert and oriented and cooperative to examination.  Pt. walks without an antalgic gait.      Examination of bilateral knees shows no rashes or erythema. There are no masses ecchymosis or effusion. Patient has full range of motion from 0-130°. Patient is nontender to palpation over lateral joint line and nontender to palpation over the medial joint line.Knee is stable to varus and valgus stress. 5 out of 5 motor strength. Palpable distal pulses. Intact light touch sensation. Negative Patellofemoral crepitus    Heart is regular rate without obvious murmurs   Normal respiratory effort without audible wheezing  Abdomen is soft and nontender       X-rays:  X-rays of both knees are ordered and reviewed which show severe medial joint space narrowing of both knees with the left slightly worse     Assessment::  Bilateral knee arthritis    Plan:  I had a long discussion with him and his wife about knee replacement surgery. We talked about the pros and cons of partial versus total knee replacement.  Patient would like to proceed with left total knee arthroplasty. Risks, benefits, and alternatives to the procedure were explained to the patient including but not limited to damage to nerves, arteries, blood vessels, bones, tendons, ligaments, stiffness, instability, infection, DVT, PE, as well as general anesthetic complications including seizure, stroke, heart attack and even death. The patient understood these risks and wished to proceed and signed the informed consent.       This note was created using Stroho voice recognition software that occasionally misinterpreted phrases or words.    Consult note is delivered  via Epic messaging service.

## 2020-01-28 RX ORDER — MUPIROCIN 20 MG/G
OINTMENT TOPICAL
Status: CANCELLED | OUTPATIENT
Start: 2020-01-28

## 2020-01-28 RX ORDER — CEFAZOLIN SODIUM 2 G/50ML
2 SOLUTION INTRAVENOUS
Status: CANCELLED | OUTPATIENT
Start: 2020-01-28

## 2020-02-06 ENCOUNTER — OFFICE VISIT (OUTPATIENT)
Dept: UROLOGY | Facility: CLINIC | Age: 75
End: 2020-02-06
Payer: MEDICARE

## 2020-02-06 VITALS
HEART RATE: 86 BPM | WEIGHT: 246.69 LBS | SYSTOLIC BLOOD PRESSURE: 140 MMHG | BODY MASS INDEX: 35.32 KG/M2 | DIASTOLIC BLOOD PRESSURE: 82 MMHG | HEIGHT: 70 IN

## 2020-02-06 DIAGNOSIS — N52.9 ERECTILE DYSFUNCTION, UNSPECIFIED ERECTILE DYSFUNCTION TYPE: ICD-10-CM

## 2020-02-06 DIAGNOSIS — N40.0 BENIGN PROSTATIC HYPERPLASIA, UNSPECIFIED WHETHER LOWER URINARY TRACT SYMPTOMS PRESENT: Primary | ICD-10-CM

## 2020-02-06 LAB
BILIRUB SERPL-MCNC: ABNORMAL MG/DL
BLOOD URINE, POC: ABNORMAL
COLOR, POC UA: YELLOW
GLUCOSE UR QL STRIP: ABNORMAL
KETONES UR QL STRIP: ABNORMAL
LEUKOCYTE ESTERASE URINE, POC: ABNORMAL
NITRITE, POC UA: ABNORMAL
PH, POC UA: 5
PROTEIN, POC: ABNORMAL
SPECIFIC GRAVITY, POC UA: 1.02
UROBILINOGEN, POC UA: ABNORMAL

## 2020-02-06 PROCEDURE — 99215 OFFICE O/P EST HI 40 MIN: CPT | Mod: S$PBB,,, | Performed by: UROLOGY

## 2020-02-06 PROCEDURE — 99999 PR PBB SHADOW E&M-EST. PATIENT-LVL IV: CPT | Mod: PBBFAC,,, | Performed by: UROLOGY

## 2020-02-06 PROCEDURE — 81002 URINALYSIS NONAUTO W/O SCOPE: CPT | Mod: PBBFAC,PN | Performed by: UROLOGY

## 2020-02-06 PROCEDURE — 99214 OFFICE O/P EST MOD 30 MIN: CPT | Mod: PBBFAC,PN | Performed by: UROLOGY

## 2020-02-06 PROCEDURE — 99999 PR PBB SHADOW E&M-EST. PATIENT-LVL IV: ICD-10-PCS | Mod: PBBFAC,,, | Performed by: UROLOGY

## 2020-02-06 PROCEDURE — 99215 PR OFFICE/OUTPT VISIT, EST, LEVL V, 40-54 MIN: ICD-10-PCS | Mod: S$PBB,,, | Performed by: UROLOGY

## 2020-02-06 NOTE — PATIENT INSTRUCTIONS
I explained to patient that I do not think that he should take more than 5mg a day. I also let him know if he has questions to ask them to me directly via phone or mychart and not self medicate or increase dose based on web search or on call urologist.     Discontinue the tadalafil 5mg once a day since he's not sure if it helped  rtc in 2 weeks for a uroflow and pvr (with a very full bladder)  If flow much slower on uroflow or if pt notices a diffference then he can either:  1. Restart tadalafil 5mg daily and take max of 20mg total prior to intercourse no more than 1x in 48 hours-72 hours (off label)  2. Or we can discuss prostate procedure and then just use tadalafil or viagra as needed at max doses for ED.    He should not be using 10mg daily.      RTC with my NP in a month and to see what the final plan is based on above.     [id number]  Activation Code: [id number]

## 2020-02-06 NOTE — PROGRESS NOTES
[facility] Note  Staff: MD Karrie  Referring: TONY Meadows    My chart: active    Chief Complaint: No chief complaint on file.      Subjective:        HPI: Manpreet Kerr is a 74 y.o. male presents with hypogonadism and ED     Initially seen by me in 2015 for low T and elevated PSA, and reports undergoing a prostate biopsy and US in the past with Dr. Romero in [place] in 2008.     He was living in the [facility] for a year and moved back to the  Sept 2013. He was     6/2016 - developed retention. Pvr: 400. had tried flomax in past but caused bad dreams, flu and fatigue however it helped.   3/2018 he developed retention, pvr: 500, 700cc drained. Tried alfuzosin, made him dizzy, however it helped.  Started on cialis 5mg daily for bph and ED and he has improvement on this.  Voiding trial after had pvr 21cc on fill and pull.     Also wants vas reversal. 27 yo wife. [facility]en someone at [place] and reversal success 60%. On list of [facility] at [facility].     On T therapy. Good energy level. Takes extra cialis when he needs to have an erection however viagra works better.     Sx of uti 7/15 and was started on cipro.  6-8 uti's in the last 2 years. Sx include burning, frequency, urethral itching, pressure in bladder, nocturia x2 (normally 1x a night). LN in left hip ache.     Couldn't tolerate flomax 0.4mg. Cysto trus on 8/12/19 showed b lobe hypertrophy, no stricture. 22g prostate. No IV median lobe. Ctu showed no obvious cause for uti's. Put him on cialis 5mg daily. residuals have been 30-50cc and he says he felt he had a better stream    Interval history:   10/15/19 UF on tadalafil 5mg daily : avg flow 11, voided vol: 320, pvr by scan: 56  ua had gluc at last visit, none today   Lab Results   Component Value Date    HGBA1C 5.6 10/21/2019     Has not had a uti in 6 months.  Very sens to pepper/paprika - went to UC and was given pyridium but has only had to use once, he has been carefully  "reviewing labels  pvr by scan today: 62  On tadalafil 5mg daily but he spoke with an "online on call urologist"  with a [place] [facility] and took 10mg once a day and says this helped with his ED. He also did web searching and increased his dose to 20mg as needed.    ua void: tr leuk- no sx of uti.   Urine history:   10/15/19 neg/100 glucose  8/12/19 Ng, void: n/a  7/30/19 No cx, void:neg,   7/18/19 No cx, void:  tr leuk - on cipro 3rd day  10/16/18 No cx, void: neg, , mycoplasma and urea neg   9/1/18  Multiple org, void: sml leuk, 30-50 wbc  3/8/18  Pvr: 21 (fill a nd pull)   3/2018  Pvr by I&O: 700cc   10/29/17 No cx, void:  Neg  6/2/16  E.coli  6/7/16  Pvr: 11cc  5/31/16 pvr by I&O:  460cc   2/24/15 No cx, void:neg  1/7/15  E.coli res to amp, cipro, lev, tetra, void: neg  10/9/14 No cx, void: neg  4/9/14  No c,x void: neg  3/15/10 Ng, void:  neg  11/6/08 Multiple org    psa history: MGF - had prostate cancer at a.84  2/6/20  pvr by scan: 62  10/15/19 UF: avg flow 11, voided vol: 320, pvr by scan: 56  8/12/19 Cysto trus 22.2g prostate with mod b lobe hypertrophy and no IV median lobe. pvr by aspiration: 60cc. Recommended urolift.   7/31/19 0.48  7/30/19 DARRYL: 30g, firm on right, pvr by scan: 30cc  7/18/19  pvr: 55cc. UF avg flow 2.9mL/s, voided volume: 37. Pvr: 63cc   10/16/18  pvr: 0  12/15/17 0.458  2016  0.31  2015  0.38  2014  0.5  1/16/12 0.56   1/5/11  0.36  1/6/10  0.30  2008  Negative prostate biopsy     Testosterone history: currently on 1.62% 3 pumps a day managed by pcp  12/15/17 400, psa 0.458 on 1.62% 3 pumps a day   3/26/15 161, 15.1/442.2, psa 0.38 on androgel 2 pumps a day  10/16/14 180  4/11/14 354  2012  androgel 2 pumps a day   1/15/19 177      REVIEW OF SYSTEMS:  General ROS: no fevers, no chills  Psychological ROS: no depression  Endocrine ROS: no heat or cold  Respiratory ROS: no SOB  Cardiovascular ROS: no CP  Gastrointestinal ROS: no abdominal pain, no constipation, no diarrhea, no " BRBPR  Musculoskeletal ROS: no muscle pain  Neurological ROS: no headaches  Dermatological ROS: no rashes  HEENT: +glasses, + sinus   ROS: per HPI     PMHx:  Past Medical History:   Diagnosis Date    Allergy     Arthritis     BPH with obstruction/lower urinary tract symptoms     Environmental allergies     H/O prostatitis     History of urinary retention     Hypertension     Urinary tract infection    hypogonadism  Erectile dysfunction  Kidney stones: No    PSHx:  Past Surgical History:   Procedure Laterality Date    HERNIA REPAIR      INGUNAL     TRANSRECTAL ULTRASOUND EXAMINATION N/A 8/12/2019    Procedure: TRANSRECTAL ULTRASOUND;  Surgeon: Latia Yoon MD;  Location: [facility];  Service: Urology;  Laterality: N/A;    VASECTOMY      WISDOM TOOTH EXTRACTION     left IHr    Stents/Valves/Foreign Bodies: No  Cardiac Evaluation: . Cardiac cath 9/26/19  Intermediate stenosis of proximal LAD, referred for FFR. Uploaded to media 10/15/19  Colonoscopy: yes, twice, last one 9 years ago - due for one    Fam Hx:  MGF - had prostate cancer at a.84  No kidney stones    Soc Hx:  No tobacco.    No alcohol  Lives in [place]   :yes  Children: 3  Occupation:retired environmental science and navy officer    Allergies:  Codeine; Morphine; and Sulfa (sulfonamide antibiotics)   Sulfa     Urologic Medications:   Anticoagulation: Yes - asa 81mg daily    Objective:     Vitals:    02/06/20 1538   BP: (!) 140/82   Pulse: 86     General:WDWN in NAD  Eyes: PERRLA, normal conjunctiva  Respiratory: no increased work on breathing, clear to auscultation  Cardiovascular: regular rate and rhythm. No obvious extremity edema.  GI: palpation of masses. No tenderness. No hepatosplenomegaly to palpation.  Musculoskeletal: normal range of motion of bilateral upper extremities. Normal muscle strength and tone.  Skin: no obvious rashes or lesions. No tightening of skin noted.  Neurologic: CN grossly normal. Normal  sensation.   Psychiatric: awake, alert and oriented x 3. Mood and affect normal. Cooperative.     7/20/19  Inspection of anus and perineum normal  No scrotal rashes, cysts or lesions  Epididymis normal in size, no tenderness  Testes normal and size, equal size bilaterally, no masses  Urethral meatus normal without discharge  Penis is circumcised  DARRYL: 30, firm on right, boggy no nodules,no tenderness. SV not palpable. Normal sphincter tone. Nohemhorroids.  No bilateral inguinal hernias noted   LIH scar        LABS REVIEW:  BMP  Lab Results   Component Value Date     03/06/2015    K 4.3 03/06/2015     03/06/2015    CO2 27 03/06/2015    BUN 19 03/06/2015    CREATININE 1.1 07/31/2019    CALCIUM 9.4 03/06/2015    ANIONGAP 8 03/06/2015    ESTGFRAFRICA >60 07/31/2019    EGFRNONAA >60 07/31/2019     Lab Results   Component Value Date    WBC 6.16 03/06/2015    HGB 15.1 03/06/2015    HCT 44.2 03/06/2015    MCV 93 03/06/2015     03/06/2015       PATHOLOGY REVIEW:  none    RADIOGRAPHIC REVIEW:  ctu 7/31/19- He appears to have a normal positioned left kidney.  It looks like he also has a benign small 1 cm adrenal lesion    The liver is of normal size and CT density.  Identified in the central right lobe is a 8.9 cm cyst.  This appears increased in size from the prior ultrasound when it measured 6.7 cm.  A 2nd small cyst measures 11 mm.  There are calcifications in the liver and spleen parenchyma consistent with prior granulomatous disease.  The gallbladder is of normal size without CT evidence of stone.  The pancreas is of normal contour and CT density without edema or mass.  The spleen is of normal size.    There is a 1.7 x 0.9 cm soft tissue density mass in the left adrenal bed a probable adenoma, less likely a lymph node.  This was not noted on the prior ultrasound.  Similar finding is not seen on the right.  The kidneys are of normal size contour and contrast enhancement without mass stone or  hydronephrosis.  The ureters follow a normal course down to the bladder without dilation or stone.  The abdominal aorta contains atherosclerotic calcification without aneurysm.  Retroperitoneal adenopathy is not otherwise seen.    The stomach is of normal configuration.  Small bowel dilatation or air-fluid levels are not seen.  A normal appendix is seen.  There are several diverticuli noted in the sigmoid colon without CT evidence of diverticulitis.  No free fluid is noted.    The bladder is of normal contour without mass or asymmetry.  The prostate is not enlarged.  No free fluid is seen in the pelvis.    ctrss 9/1/18  RML clacified granuloma  Left kd partially rotated  inocomplete emptying of bladder  Mild urinary bladder wall thickening         Assessment:       1. Benign prostatic hyperplasia, unspecified whether lower urinary tract symptoms present    2. Erectile dysfunction, unspecified erectile dysfunction type          Plan:         I explained to patient that I do not think that he should take more than 5mg a day. I also let him know if he has questions to ask them to me directly via phone or mychart and not self medicate or increase dose based on web search or on call urologist.     Discontinue the tadalafil 5mg once a day since he's not sure if it helped  rtc in 2 weeks for a uroflow and pvr (with a very full bladder)  If flow much slower on uroflow or if pt notices a diffference then he can either:  1. Restart tadalafil 5mg daily and take max of 20mg total prior to intercourse no more than 1x in 48 hours-72 hours (off label)  2. Or we can discuss prostate procedure and then just use tadalafil or viagra as needed at max doses for ED.    He should not be using 10mg daily.      RTC with my NP in a month and to see what the final plan is based on above.     Latia Yoon MD

## 2020-02-20 ENCOUNTER — CLINICAL SUPPORT (OUTPATIENT)
Dept: UROLOGY | Facility: CLINIC | Age: 75
End: 2020-02-20
Payer: MEDICARE

## 2020-02-20 DIAGNOSIS — N40.0 BENIGN PROSTATIC HYPERPLASIA, UNSPECIFIED WHETHER LOWER URINARY TRACT SYMPTOMS PRESENT: Primary | ICD-10-CM

## 2020-02-20 PROCEDURE — 51741 ELECTRO-UROFLOWMETRY FIRST: CPT | Mod: 26,S$PBB,, | Performed by: UROLOGY

## 2020-02-20 PROCEDURE — 51741 PR UROFLOWMETRY, COMPLEX: ICD-10-PCS | Mod: 26,S$PBB,, | Performed by: UROLOGY

## 2020-02-20 PROCEDURE — 99499 UNLISTED E&M SERVICE: CPT | Mod: S$PBB,,, | Performed by: UROLOGY

## 2020-02-20 PROCEDURE — 99499 NO LOS: ICD-10-PCS | Mod: S$PBB,,, | Performed by: UROLOGY

## 2020-02-20 PROCEDURE — 51741 ELECTRO-UROFLOWMETRY FIRST: CPT | Mod: PBBFAC,PN | Performed by: UROLOGY

## 2020-02-20 NOTE — PROGRESS NOTES
Per  patient arrived in clinic for uroflow and pvr.    Uroflow results (date: 02/20/2020) on  :   Voiding time: 36.5s,   Flow time: 32.5s,   TTP flow: 8.6s,   Peak flowrate: 14.3 mL/s,   Average flowrate: 7.9mL/s,   Intervals: 3,    Voided volume: 257 mL,    Pvr by bladder scan: 25.   Pattern of curve: to be determined by physician.

## 2020-03-03 NOTE — PROGRESS NOTES
Uroflow results (date: 02/20/2020) on tadalafil 5mg once daily  :   Voiding time: 36.5s,   Flow time: 32.5s,   TTP flow: 8.6s,   Peak flowrate: 14.3 mL/s,   Average flowrate: 7.9mL/s,   Intervals: 3,    Voided volume: 257 mL,    Pvr by bladder scan: 25.   Pattern of curve:  Fast rise, slightly delayed empty      Uroflow results reviewed and interpreted by me ()

## 2020-03-05 ENCOUNTER — HOSPITAL ENCOUNTER (OUTPATIENT)
Dept: PREADMISSION TESTING | Facility: HOSPITAL | Age: 75
Discharge: HOME | End: 2020-03-05

## 2020-03-05 DIAGNOSIS — M17.0 PRIMARY OSTEOARTHRITIS OF BOTH KNEES: ICD-10-CM

## 2020-03-09 ENCOUNTER — HOSPITAL ENCOUNTER (OUTPATIENT)
Dept: PREADMISSION TESTING | Facility: HOSPITAL | Age: 75
Discharge: HOME | End: 2020-03-09
Attending: ORTHOPAEDIC SURGERY
Payer: MEDICARE

## 2020-03-09 ENCOUNTER — HOSPITAL ENCOUNTER (OUTPATIENT)
Dept: RADIOLOGY | Facility: HOSPITAL | Age: 75
Discharge: HOME | End: 2020-03-09
Attending: ORTHOPAEDIC SURGERY
Payer: MEDICARE

## 2020-03-09 VITALS — BODY MASS INDEX: 34.36 KG/M2 | WEIGHT: 240 LBS | HEIGHT: 70 IN

## 2020-03-09 DIAGNOSIS — Z01.818 PRE-OP TESTING: ICD-10-CM

## 2020-03-09 DIAGNOSIS — M17.0 PRIMARY OSTEOARTHRITIS OF BOTH KNEES: ICD-10-CM

## 2020-03-09 LAB
ABO + RH BLD: NORMAL
ANION GAP SERPL CALC-SCNC: 8 MMOL/L (ref 8–16)
BASOPHILS # BLD AUTO: 0.05 K/UL (ref 0–0.2)
BASOPHILS NFR BLD: 0.9 % (ref 0–1.9)
BLD GP AB SCN CELLS X3 SERPL QL: NORMAL
BUN SERPL-MCNC: 14 MG/DL (ref 8–23)
CALCIUM SERPL-MCNC: 9.3 MG/DL (ref 8.7–10.5)
CHLORIDE SERPL-SCNC: 105 MMOL/L (ref 95–110)
CO2 SERPL-SCNC: 24 MMOL/L (ref 23–29)
CREAT SERPL-MCNC: 1 MG/DL (ref 0.5–1.4)
DIFFERENTIAL METHOD: ABNORMAL
EOSINOPHIL # BLD AUTO: 0.3 K/UL (ref 0–0.5)
EOSINOPHIL NFR BLD: 5.5 % (ref 0–8)
ERYTHROCYTE [DISTWIDTH] IN BLOOD BY AUTOMATED COUNT: 14 % (ref 11.5–14.5)
EST. GFR  (AFRICAN AMERICAN): >60 ML/MIN/1.73 M^2
EST. GFR  (NON AFRICAN AMERICAN): >60 ML/MIN/1.73 M^2
GLUCOSE SERPL-MCNC: 97 MG/DL (ref 70–110)
HCT VFR BLD AUTO: 49 % (ref 40–54)
HGB BLD-MCNC: 15.7 G/DL (ref 14–18)
IMM GRANULOCYTES # BLD AUTO: 0.01 K/UL (ref 0–0.04)
LYMPHOCYTES # BLD AUTO: 1.7 K/UL (ref 1–4.8)
LYMPHOCYTES NFR BLD: 32.8 % (ref 18–48)
MCH RBC QN AUTO: 30.8 PG (ref 27–31)
MCHC RBC AUTO-ENTMCNC: 32 G/DL (ref 32–36)
MCV RBC AUTO: 96 FL (ref 82–98)
MONOCYTES # BLD AUTO: 0.5 K/UL (ref 0.3–1)
MONOCYTES NFR BLD: 8.5 % (ref 4–15)
NEUTROPHILS # BLD AUTO: 2.8 K/UL (ref 1.8–7.7)
NEUTROPHILS NFR BLD: 52.1 % (ref 38–73)
NRBC BLD-RTO: 0 /100 WBC
PLATELET # BLD AUTO: 142 K/UL (ref 150–350)
PMV BLD AUTO: 11.7 FL (ref 9.2–12.9)
POTASSIUM SERPL-SCNC: 4.5 MMOL/L (ref 3.5–5.1)
RBC # BLD AUTO: 5.1 M/UL (ref 4.6–6.2)
SODIUM SERPL-SCNC: 137 MMOL/L (ref 136–145)
WBC # BLD AUTO: 5.3 K/UL (ref 3.9–12.7)

## 2020-03-09 PROCEDURE — 93005 ELECTROCARDIOGRAM TRACING: CPT | Performed by: INTERNAL MEDICINE

## 2020-03-09 PROCEDURE — 86901 BLOOD TYPING SEROLOGIC RH(D): CPT

## 2020-03-09 PROCEDURE — 80048 BASIC METABOLIC PNL TOTAL CA: CPT

## 2020-03-09 PROCEDURE — 87081 CULTURE SCREEN ONLY: CPT

## 2020-03-09 PROCEDURE — 71046 X-RAY EXAM CHEST 2 VIEWS: CPT | Mod: 26,,, | Performed by: RADIOLOGY

## 2020-03-09 PROCEDURE — 93005 ELECTROCARDIOGRAM TRACING: CPT

## 2020-03-09 PROCEDURE — 71046 X-RAY EXAM CHEST 2 VIEWS: CPT | Mod: TC,FY

## 2020-03-09 PROCEDURE — 93010 EKG 12-LEAD: ICD-10-PCS | Mod: ,,, | Performed by: INTERNAL MEDICINE

## 2020-03-09 PROCEDURE — 99900103 DSU ONLY-NO CHARGE-INITIAL HR (STAT)

## 2020-03-09 PROCEDURE — 99900104 DSU ONLY-NO CHARGE-EA ADD'L HR (STAT)

## 2020-03-09 PROCEDURE — 71046 XR CHEST PA AND LATERAL: ICD-10-PCS | Mod: 26,,, | Performed by: RADIOLOGY

## 2020-03-09 PROCEDURE — 87077 CULTURE AEROBIC IDENTIFY: CPT

## 2020-03-09 PROCEDURE — 85025 COMPLETE CBC W/AUTO DIFF WBC: CPT

## 2020-03-09 PROCEDURE — 87186 SC STD MICRODIL/AGAR DIL: CPT

## 2020-03-09 PROCEDURE — 36415 COLL VENOUS BLD VENIPUNCTURE: CPT

## 2020-03-09 PROCEDURE — 93010 ELECTROCARDIOGRAM REPORT: CPT | Mod: ,,, | Performed by: INTERNAL MEDICINE

## 2020-03-09 RX ORDER — ASPIRIN 81 MG/1
81 TABLET ORAL DAILY
COMMUNITY

## 2020-03-09 NOTE — PRE ADMISSION SCREENING
JOINT CAMP ASSESSMENT    Name Manpreet Kerr   MRN [id number]    Age/Sex 74 y.o. male    Surgeon Dr. Max Garcia   Joint Camp Date 3/9/2020   Surgery Date 3/17/2020   Procedure Left Knee Arthroplasty   Insurance Payor: MEDICARE / Plan: MEDICARE PART A & B / Product Type: Government /    Care Team Patient Care Team:  Primary Doctor No as PCP - General  Max Garcia MD as Consulting Physician (Sports Medicine)    Pharmacy   [facility] [place] - [address]  [address]  [address]  [place] [address]  Phone: [phone] Fax: [phone]     AM-PAC Score   24   Risk Assessment Score 2     Past Medical History:   Diagnosis Date    Allergy     Arthritis     BPH with obstruction/lower urinary tract symptoms     Environmental allergies     H/O prostatitis     History of urinary retention     Native (hard of hearing)     WEARS BILATERAL HEARING AIDS    Hypertension     Urinary tract infection        Past Surgical History:   Procedure Laterality Date    HERNIA REPAIR      INGUNAL     TRANSRECTAL ULTRASOUND EXAMINATION N/A 8/12/2019    Procedure: TRANSRECTAL ULTRASOUND;  Surgeon: aLtia Yoon MD;  Location: [facility] OR;  Service: Urology;  Laterality: N/A;    VASECTOMY      WISDOM TOOTH EXTRACTION           Home Enviroment     Living Arrangement: Lives with spouse  Home Environment: 1-story house/ trailer, tub-shower  Home Safety Concerns: unremarkable    DISCHARGE CAREGIVER/SUPPORT SYSTEM     Identified post-op caregiver: Patient has spouse / significant other.  Patient's caregiver(s) will be able to provide physical assistance. Patient will have someone to assist overnight.      Caregiver present at pre-op interview:  No      PRE-OPERATIVE FUNCTIONAL STATUS     Employment: Retired    Pre-op Functional Status: Patient is independent with mobility/ambulation, transfers, ADL's, IADL's.    Use of assistive device for ambulation: none  ADL: self  care  ADL Limitations: difficulty with walking  Medical Restrictions: Decreased range of motions in extremities    POTENTIAL BARRIERS TO DISCHARGE/POTENTIAL POST-OP COMPLICATIONS     Patient with hx of HTN, BPH with urinary complications. Patient is hard of hearing.    DISCHARGE PLAN     Expected LOS of 2 days or less for joint replacement discussed with patient. We also discussed a discharge path of HH for approximately two weeks with a transition to outpatient PT on the third week given no post-op complications.      Patient in agreement with discharge plan: Yes    Discharge to: Discharge home with home health (PT/OT) x2 weeks with transition to outpatient PT     HH:  [facility] ([place])     OP PT: [facility] ([place])     Home DME: rolling walker and bedside commode    Needed DME at D/C: tub transfer bench. Patient to purchase retail prior to surgery.     Rx: Per Dr. Garcia at discharge     Meds to Beds: N/A  Patient expected to discharge on Aspirin 325mg by mouth twice daily for DVT prophylaxis.

## 2020-03-09 NOTE — PRE ADMISSION SCREENING
Patient Name: Manpreet Kerr  YOB: 1945   MRN: [id number]     [facility]-PAC   Basic Mobility Inpatient Short Form 6 Clicks         How much difficulty does the patient currently have  Unable  A Lot  A Little  None      1. Turning over in bed (including adjusting bedclothes, sheets and blankets)?     1 []    2 []    3 []    4 [x]        2. Sitting down on and standing up from a chair with arms (e.g., wheelchair, bedside commode, etc.)     1 []  2 []  3 []     4 [x]      3. Moving from lying on back to sitting on the side of the bed?     1 []  2 []  3 []    4 [x]    How much help from another person does the patient currently need  Total  A Lot  A Little  None      4. Moving to and from a bed to a chair (including a wheelchair)?    1 []  2 []  3 []    4 [x]      5. Need to walk in hospital room?    1 []  2 []  3 []    4 [x]      6. Climbing 3-5 steps with a railing?    1 []  2 []  3 []    4 [x]       Raw Score:     24             CMS 0-100% Score:     0       %   Standardized Score:    61.14           CMS Modifier:       [facility] AM-PAC   Basic Mobility Inpatient Short Form 6 Clicks Score Conversion Table*         *Use this form to convert -PAC Basic Mobility Inpatient Raw Scores.   [facility] Inpatient Basic Mobility Short Form Scoring Example   1. Add the number values associated with the response to each item. For example, items totals yield a Raw Score of 21.   2. Match the raw score to the t-Scale scores (t-Scale score = 50.25, SE = 4.69).   3. Find the associated CMS % (CMS % = 28.97%).   4. Locate the correct CMS Functional Modifier Code, or G Code (G code = CJ)     NOTE: Each -PAC Short Form has a separate conversion table. Make sure that you use the correct conversion table.       Instruction Manual - page 45 contains conversion table

## 2020-03-09 NOTE — DISCHARGE INSTRUCTIONS
To confirm, Your doctor has instructed you that surgery is scheduled for: 3/17/20   Leidy [phone]    Please report to [facility] the morning of surgery. You must check-in and receive a wristband before going to your procedure.    Pre-Op will call the afternoon prior to surgery between 1:00 and 6:00 PM with the final arrival time.  Phone number: [phone]    PLEASE NOTE:  The surgery schedule has many variables which may affect the time of your surgery case.  Family members should be available if your surgery time changes.  Plan to be here the day of your procedure between 4-6 hours.    MEDICATIONS:  TAKE ONLY THESE MEDICATIONS WITH A SMALL SIP OF WATER THE MORNING OF YOUR PROCEDURE:     NASAL SPRAY, FLONASE, CRESTOR,  PRILOSEC                                      NO LOSARTAN AM OF SURGERY    DO NOT TAKE THESE MEDICATIONS 5-7 DAYS PRIOR to your procedure or per your surgeon's request: ASPIRIN, ALEVE, ADVIL, IBUPROFEN, FISH OIL VITAMIN E, HERBALS  (May take Tylenol)                                         HOLD CIALIS AND COZAAR AM OF SURGERY    ONLY if you are prescribed any types of blood thinners such as:  Aspirin, Coumadin, Plavix, Pradaxa, Xarelto, Aggrenox, Effient, Eliquis, Savasya, Brilinta, or any other, ask your surgeon whether you should stop taking them and how long before surgery you should stop.  You may also need to verify with the prescribing physician if it is ok to stop your medication.      INSTRUCTIONS IMPORTANT!!  · Do not eat or drink anything between midnight and the time of your procedure- this includes gum, mints, and candy.  · Do not smoke or drink alcoholic beverages 24 hours prior to your procedure.  · Shower the night before AND the morning of your procedure with a Chlorhexidine wash such as Hibiclens or Dial antibacterial soap from the neck down.  Do not get it on your face or in your eyes.  You may use your own shampoo and face wash. This  helps your skin to be as bacteria free as possible.    · If you wear contact lenses, dentures, hearing aids or glasses, bring a container to put them in during surgery and give to a family member for safe keeping.  Please leave all jewelry, piercing's and valuables at home.   · DO NOT remove hair from the surgery site.  Do not shave the incision site unless you are given specific instructions to do so.    · ONLY if you have been diagnosed with sleep apnea please bring your C-PAP machine.  · ONLY if you wear home oxygen please bring your portable oxygen tank the day of your procedure.  · ONLY if you have a history of OPEN HEART SURGERY you will need a clearance from your Cardiologist per Anesthesia.      · ONLY for patients requiring bowel prep, written instructions will be given by your doctor's office.  · ONLY if you have a neuro stimulator, please bring the controller with you the morning of surgery  · ONLY if a type and screen test is needed before surgery, please return:  · If your doctor has scheduled you for an overnight stay, bring a small overnight bag with any personal items you need.  · Make arrangements in advance for transportation home by a responsible adult.  It is not safe to drive a vehicle during the 24 hours after anesthesia.      · Visiting hours are 10:00AM to 8:30PM.  For the safety of all patients, visitors under the age of 12 are not allowed above the first floor of the hospital.    · All [facility] facilities and properties are tobacco free.  Smoking is NOT allowed.       If you have any questions about these instructions, call Pre-Op Admit  Nursing at [phone] or the Pre-Op Day Surgery Unit at [phone].

## 2020-03-09 NOTE — PRE ADMISSION SCREENING
"               [facility] Risk Assessment Scale    Patient Name: Manpreet Kerr  YOB: 1945  MRN: [id number]            RIsk Factor Measure Recommendation Patient Data Scale/Score   BMI >40 Reconsider surgery, weight loss   Estimated body mass index is 34.44 kg/m² as calculated from the following:    Height as of this encounter: 5' 10" (1.778 m).    Weight as of this encounter: 108.9 kg (240 lb).   [] 0 = 1 - 24.9  [] 1 = 25-29.9  [x] 2 = 30-34.9  [] 3 = 35-39.9  [] 4 = 40-44.9  [] 5 = 45-99.9   Hemoglobin AIC (if applicable) >9 Delay surgery until DM under control  Refer for:  · Nutrition Therapy  · Exercise   · Medication    Lab Results   Component Value Date    HGBA1C 5.6 10/21/2019       Lab Results   Component Value Date    GLU 97 03/09/2020      [] 0 = 4.0-5.6  [] 1 = 5.7-6.4  [] 2 = 6.5-6.9  [] 3 = 7.0-7.9  [] 4 = 8.0-8.9  [] 5 = 9.0-12   Hemoglobin (Anemia) <9 Delay surgery   Correct anemia Lab Results   Component Value Date    HGB 15.7 03/09/2020    [] 20 - <9.0                    Albumin <3 Delay surgery &Workup Lab Results   Component Value Date    ALBUMIN 3.4 (L) 03/06/2015    [] 20 - <3.0   Smoking Cessation >4 Weeks Delay Surgery  Refer to OP Cessation Class    Former Smoker  Quit: 1974 [] 20 - current smoker                                _____ PPD                    Hx of MI, PE, Arrhythmia, CVA, DVT <30 Days Delay Surgery    N/A [] 20      Infection Variable Delay surgery and re-evaluate   N/A [] 20 - recent/current infection     Depression (PHQ) >10 out of 27 Delay Surgery and re-evaluate  Medication  Counseling              [x] 0     []1     []2     []3      []4      [] 5                    (1-4)      (5-9)  (10-14)  (15-19)   (20-27)     Memory Impairment & Memory loss (Mini-Cog Screening Tool) Advanced dementia and/or Parkinson's Reconsider surgery     [x] 0     []1     []2     []3     []4     [] 5     Physical Conditioning (Modified AM-PAC Per Physical Therapy at Joint Camp) Unable to " ambulate on day of surgery Delay surgery and re-evaluate  Pre-Rehabilitation   (PT evaluation)       [x]  0   []4       []8     []12        []16     []20       (<20%)   (<40%)   (<60%)   (<80% )    (>80%)     Home Environment/Caregiver support  (Per /Navigator Interview)    Availability of basic services and/or approprate assistance during post-operative period Delay surgery and re-evaluate  Safe home environment  Health   1 week post-surgery  Transportation  availability  Ability to obtain DME/Medications post-op    [x] 0     []1     []2     []3     []4     [] 5  [x] 0     []1     []2     []3     []4     [] 5  [x] 0     []1     []2     []3     []4     [] 5  [x] 0     []1     []2     []3     []4     [] 5         MD Contact: Dr. Garcia Comments:  Total Score:  2

## 2020-03-12 ENCOUNTER — OFFICE VISIT (OUTPATIENT)
Dept: UROLOGY | Facility: CLINIC | Age: 75
End: 2020-03-12
Payer: MEDICARE

## 2020-03-12 VITALS
HEIGHT: 70 IN | HEART RATE: 61 BPM | TEMPERATURE: 98 F | BODY MASS INDEX: 34.72 KG/M2 | RESPIRATION RATE: 18 BRPM | SYSTOLIC BLOOD PRESSURE: 136 MMHG | WEIGHT: 242.5 LBS | DIASTOLIC BLOOD PRESSURE: 75 MMHG

## 2020-03-12 DIAGNOSIS — N40.0 BENIGN PROSTATIC HYPERPLASIA, UNSPECIFIED WHETHER LOWER URINARY TRACT SYMPTOMS PRESENT: ICD-10-CM

## 2020-03-12 DIAGNOSIS — N52.9 ERECTILE DYSFUNCTION, UNSPECIFIED ERECTILE DYSFUNCTION TYPE: Primary | ICD-10-CM

## 2020-03-12 LAB — MRSA SPEC QL CULT: NORMAL

## 2020-03-12 PROCEDURE — 99214 PR OFFICE/OUTPT VISIT, EST, LEVL IV, 30-39 MIN: ICD-10-PCS | Mod: S$PBB,,, | Performed by: NURSE PRACTITIONER

## 2020-03-12 PROCEDURE — 99214 OFFICE O/P EST MOD 30 MIN: CPT | Mod: PBBFAC,PN | Performed by: NURSE PRACTITIONER

## 2020-03-12 PROCEDURE — 99999 PR PBB SHADOW E&M-EST. PATIENT-LVL IV: CPT | Mod: PBBFAC,,, | Performed by: NURSE PRACTITIONER

## 2020-03-12 PROCEDURE — 99214 OFFICE O/P EST MOD 30 MIN: CPT | Mod: S$PBB,,, | Performed by: NURSE PRACTITIONER

## 2020-03-12 PROCEDURE — 99999 PR PBB SHADOW E&M-EST. PATIENT-LVL IV: ICD-10-PCS | Mod: PBBFAC,,, | Performed by: NURSE PRACTITIONER

## 2020-03-12 NOTE — PROGRESS NOTES
[facility] Urology Clinic Note  Staff: FUNMI Avila    PCP: N/A    Chief Complaint: ED, Hypogonadism    Subjective:        HPI: Manpreet Kerr is a 74 y.o. male presents today for follow-up    The pt was last seen by Dr. Latia Yoon on 02/06/2020 for hypogonadism and ED issues.  Pt is very pleased with the response that the Cialis 5 mg daily usage has been to his LUTS, but still requesting something for times during ED/relations at this time.  He denies gross hematuria and dysuria at this time.    Initially seen by MD in 2015 for low T and elevated PSA, and reports undergoing a prostate biopsy and US in the past with Dr. Romero in [place] in 2008.      He was living in the [facility] for a year and moved back to the  Sept 2013. He was      6/2016 - developed retention. Pvr: 400. had tried flomax in past but caused bad dreams, flu and fatigue however it helped.   3/2018 he developed retention, pvr: 500, 700cc drained. Tried alfuzosin, made him dizzy, however it helped.  Started on cialis 5mg daily for bph and ED and he has improvement on this.  Voiding trial after had pvr 21cc on fill and pull.      Also wants vas reversal. 27 yo wife. Seen someone at [facility] and reversal success 60%. On list of vasovasotomy at [employer].      On T therapy. Good energy level. Takes extra cialis when he needs to have an erection however viagra works better.      Sx of uti 7/15 and was started on cipro.  6-8 uti's in the last 2 years. Sx include burning, frequency, urethral itching, pressure in bladder, nocturia x2 (normally 1x a night). LN in left hip ache.      Couldn't tolerate flomax 0.4mg. Cysto trus on 8/12/19 showed b lobe hypertrophy, no stricture. 22g prostate. No IV median lobe. Ctu showed no obvious cause for uti's. Put him on cialis 5mg daily. residuals have been 30-50cc and he says he felt he had a better stream     Interval history:   10/15/19          UF on tadalafil 5mg daily : avg flow  "11, voided vol: 320, pvr by scan: 56  ua had gluc at last visit, none today         Lab Results   Component Value Date     HGBA1C 5.6 10/21/2019      Has not had a uti in 6 months.  Very sens to pepper/paprika - went to UC and was given pyridium but has only had to use once, he has been carefully reviewing labels  pvr by scan today: 62  On tadalafil 5mg daily but he spoke with an "online on call urologist"  with a [place] pharmacy and took 10mg once a day and says this helped with his ED. He also did web searching and increased his dose to 20mg as needed.     Urine history:   10/15/19          neg/100 glucose  8/12/19            Ng, void: n/a  7/30/19            No cx, void:neg,   7/18/19            No cx, void:  tr leuk - on cipro 3rd day  10/16/18          No cx, void: neg, , mycoplasma and urea neg   9/1/18              Multiple org, void: sml leuk, 30-50 wbc  3/8/18              Pvr: 21 (fill a nd pull)   3/2018             Pvr by I&O: 700cc   10/29/17          No cx, void:  Neg  6/2/16              E.coli  6/7/16              Pvr: 11cc  5/31/16            pvr by I&O:  460cc   2/24/15            No cx, void:neg  1/7/15              E.coli res to amp, cipro, lev, tetra, void: neg  10/9/14            No cx, void: neg  4/9/14              No c,x void: neg  3/15/10            Ng, void:  neg  11/6/08            Multiple org     psa history: MGF - had prostate cancer at a.84  2/6/20              pvr by scan: 62  10/15/19          UF: avg flow 11, voided vol: 320, pvr by scan: 56  8/12/19            Cysto trus 22.2g prostate with mod b lobe hypertrophy and no IV median lobe. pvr by aspiration: 60cc. Recommended urolift.   7/31/19            0.48  7/30/19            DARRYL: 30g, firm on right, pvr by scan: 30cc  7/18/19            pvr: 55cc. UF avg flow 2.9mL/s, voided volume: 37. Pvr: 63cc   10/16/18           pvr: 0  12/15/17          0.458  2016                0.31  2015                0.38  2014                " 0.5  1/16/12            0.56       1/5/11              0.36  1/6/10              0.30  2008                Negative prostate biopsy      Testosterone history: currently on 1.62% 3 pumps a day managed by pcp  12/15/17          400, psa 0.458 on 1.62% 3 pumps a day   3/26/15            161, 15.1/442.2, psa 0.38 on androgel 2 pumps a day  10/16/14          180  4/11/14            354  2012                androgel 2 pumps a day   1/15/19            177      REVIEW OF SYSTEMS:  General ROS: no fevers, no chills  Psychological ROS: no depression  Endocrine ROS: no heat or cold  Respiratory ROS: no SOB  Cardiovascular ROS: no CP  Gastrointestinal ROS: no abdominal pain, no constipation, no diarrhea, no BRBPR  Musculoskeletal ROS: no muscle pain  Neurological ROS: no headaches  Dermatological ROS: no rashes  HEENT: +glasses, + sinus   ROS: per HPI     PMHx:  Past Medical History:   Diagnosis Date    Allergy     Arthritis     BPH with obstruction/lower urinary tract symptoms     Environmental allergies     H/O prostatitis     History of urinary retention     [place] (hard of hearing)     WEARS BILATERAL HEARING AIDS    Hypertension     Urinary tract infection      PSHx:  Past Surgical History:   Procedure Laterality Date    HERNIA REPAIR      INGUNAL     TRANSRECTAL ULTRASOUND EXAMINATION N/A 8/12/2019    Procedure: TRANSRECTAL ULTRASOUND;  Surgeon: Latia Yoon MD;  Location: [facility];  Service: Urology;  Laterality: N/A;    VASECTOMY      WISDOM TOOTH EXTRACTION       Allergies:  Codeine; Morphine; and Sulfa (sulfonamide antibiotics)    Medications: reviewed   Objective:     Vitals:    03/12/20 1344   BP: 136/75   Pulse: 61   Resp: 18   Temp: 98 °F (36.7 °C)     General:WDWN in NAD  Eyes: PERRLA, normal conjunctiva  Respiratory: no increased work on breathing, clear to auscultation  Cardiovascular: regular rate and rhythm. No obvious extremity edema.  GI: palpation of masses. No tenderness. No  "hepatosplenomegaly to palpation.  Musculoskeletal: normal range of motion of bilateral upper extremities. Normal muscle strength and tone.  Skin: no obvious rashes or lesions. No tightening of skin noted.  Neurologic: CN grossly normal. Normal sensation.   Psychiatric: awake, alert and oriented x 3. Mood and affect normal. Cooperative.     7/20/19  Inspection of anus and perineum normal  No scrotal rashes, cysts or lesions  Epididymis normal in size, no tenderness  Testes normal and size, equal size bilaterally, no masses  Urethral meatus normal without discharge  Penis is circumcised  DARRYL: 30, firm on right, boggy no nodules,no tenderness. SV not palpable. Normal sphincter tone. Nohemhorroids.  No bilateral inguinal hernias noted   [facility] scar    Uroflow results (date: 02/20/2020) on tadalafil 5mg once daily:   Voiding time: 36.5s,   Flow time: 32.5s,   TTP flow: 8.6s,   Peak flowrate: 14.3 mL/s,   Average flowrate: 7.9mL/s,   Intervals: 3,    Voided volume: 257 mL,    Pvr by bladder scan: 25.   Pattern of curve:  Fast rise, slightly delayed empty     Assessment:       1. Erectile dysfunction, unspecified erectile dysfunction type    2. Benign prostatic hyperplasia, unspecified whether lower urinary tract symptoms present          Plan:     I re-instructed to this patient that I agree with Dr. Yoon and advise taking no more than Cialis 5mg a day. I also let him know again if he has questions to ask them to me directly via phone or mychart and not self medicate or increase dose based on web search or on call urologist.     The following instructions were given to pt during ov today by me:   1. Restart tadalafil 5mg daily and take max of 20mg total prior to intercourse no more than 1x in 48 hours-72 hours (off label)--pt will email me the address of pharmacy he would like me to send these to out in "[place]" for extra cial[facility] ED.  [facility]     F/u with Dr." Lazbuddie in 6 months.    [facility]: Active    Pepper Hensley, EMMETT-C

## 2020-03-13 ENCOUNTER — PATIENT MESSAGE (OUTPATIENT)
Dept: UROLOGY | Facility: CLINIC | Age: 75
End: 2020-03-13

## 2020-03-13 RX ORDER — TADALAFIL 5 MG/1
5 TABLET ORAL DAILY PRN
Qty: 15 TABLET | Refills: 12 | Status: SHIPPED | OUTPATIENT
Start: 2020-03-13 | End: 2020-04-12

## 2020-03-13 NOTE — TELEPHONE ENCOUNTER
Cialis 5 mg (Take no more than 20 mg in a 48-72 hour period) for ED issues was sent to pharmacy requested this afternoon.

## 2020-03-16 ENCOUNTER — DOCUMENTATION ONLY (OUTPATIENT)
Dept: ORTHOPEDICS | Facility: CLINIC | Age: 75
End: 2020-03-16

## 2020-03-16 NOTE — PROGRESS NOTES
Spoke with patient concerning need for tub transfer bench. Patient has all other equipment. Tub transfer bench will need to be pulled from HME depot at the hospital prior to patient discharge. Case management notified.

## 2020-05-06 ENCOUNTER — TELEPHONE (OUTPATIENT)
Dept: ORTHOPEDICS | Facility: CLINIC | Age: 75
End: 2020-05-06

## 2020-05-06 DIAGNOSIS — Z01.818 PRE-OP TESTING: ICD-10-CM

## 2020-05-06 DIAGNOSIS — M17.12 OSTEOARTHRITIS OF LEFT KNEE: ICD-10-CM

## 2020-05-06 DIAGNOSIS — M17.12 PRIMARY OSTEOARTHRITIS OF LEFT KNEE: Primary | ICD-10-CM

## 2020-05-06 DIAGNOSIS — Z01.818 PRE-OP TESTING: Primary | ICD-10-CM

## 2020-05-06 RX ORDER — CEFAZOLIN SODIUM 2 G/50ML
2 SOLUTION INTRAVENOUS
Status: CANCELLED | OUTPATIENT
Start: 2020-05-06

## 2020-05-06 NOTE — TELEPHONE ENCOUNTER
----- Message from Lucrecia Rodrigez MA sent at 5/6/2020  1:20 PM CDT -----  Contact: paty Gutierrez to discuss/schedule surgery   Call back

## 2020-05-29 ENCOUNTER — TELEPHONE (OUTPATIENT)
Dept: ORTHOPEDICS | Facility: CLINIC | Age: 75
End: 2020-05-29

## 2020-05-29 DIAGNOSIS — M17.12 PRIMARY OSTEOARTHRITIS OF LEFT KNEE: Primary | ICD-10-CM

## 2020-05-29 NOTE — TELEPHONE ENCOUNTER
Called and spoke with HH and advised that we do not have an updated H&P at this time. We will have a new one once he has surgery. She verbalized understanding.

## 2020-05-29 NOTE — TELEPHONE ENCOUNTER
----- Message from Manpreet Pacheco sent at 5/29/2020  3:15 PM CDT -----  Contact: Susan with [facility]  - [phone]   fax   Needs  Notes

## 2020-06-01 ENCOUNTER — TELEPHONE (OUTPATIENT)
Dept: ORTHOPEDICS | Facility: CLINIC | Age: 75
End: 2020-06-01

## 2020-06-01 NOTE — TELEPHONE ENCOUNTER
----- Message from Lucrecia Rodrigez MA sent at 6/1/2020  8:04 AM CDT -----  Contact: paty   Calling to cancel surgery   Call back

## 2020-06-01 NOTE — TELEPHONE ENCOUNTER
Patient called to cancel his TKA that is scheduled tomorrow with Dr. Garcia. He stated that his wife is a CNA at a facility and they will not let her come back to work for 2 weeks if he went into the hospital. He will contact us back at a later date to reschedule. Cancelled surgery per request.

## 2020-08-07 DIAGNOSIS — Z01.818 PRE-OP TESTING: ICD-10-CM

## 2020-08-07 DIAGNOSIS — M17.12 OSTEOARTHRITIS OF LEFT KNEE: ICD-10-CM

## 2020-08-07 DIAGNOSIS — M17.12 PRIMARY OSTEOARTHRITIS OF LEFT KNEE: Primary | ICD-10-CM

## 2020-08-07 RX ORDER — CEFAZOLIN SODIUM 2 G/50ML
2 SOLUTION INTRAVENOUS
Status: CANCELLED | OUTPATIENT
Start: 2020-08-07

## 2020-08-07 RX ORDER — TRANEXAMIC ACID 100 MG/ML
1000 INJECTION, SOLUTION INTRAVENOUS
Status: CANCELLED | OUTPATIENT
Start: 2020-08-07

## 2020-08-07 RX ORDER — MUPIROCIN 20 MG/G
OINTMENT TOPICAL
Status: CANCELLED | OUTPATIENT
Start: 2020-08-07

## 2020-08-11 NOTE — PRE ADMISSION SCREENING
JOINT CAMP ASSESSMENT    Name Manpreet Kerr   MRN [id number]    Age/Sex 74 y.o. male    Surgeon Dr. Max Mead*   Joint Camp Date 8/13/2020   Surgery Date 8/25/2020   Procedure Left knee arthroplasty   Insurance Payor: MEDICARE / Plan: MEDICARE PART A & B / Product Type: Government /    Care Team Patient Care Team:  Primary Doctor No as PCP - General  Max Garcia MD as Consulting Physician (Sports Medicine)    Pharmacy   [facility] [place] - [address]  [address]  [address]  [place] [address]  Phone: [phone] Fax: [phone]    [employer] [address] [address]  Phone: [phone] Fax: [phone]     AM-PAC Score    24   Risk Assessment Score 2     Past Medical History:   Diagnosis Date    Allergy     Arthritis     BPH with obstruction/lower urinary tract symptoms     Environmental allergies     H/O prostatitis     History of urinary retention     [place] (hard of hearing)     WEARS BILATERAL HEARING AIDS    Hypertension     Urinary tract infection        Past Surgical History:   Procedure Laterality Date    HERNIA REPAIR      INGUNAL     TRANSRECTAL ULTRASOUND EXAMINATION N/A 8/12/2019    Procedure: TRANSRECTAL ULTRASOUND;  Surgeon: Latia Yoon MD;  Location: [facility];  Service: Urology;  Laterality: N/A;    VASECTOMY      WISDOM TOOTH EXTRACTION           Home Enviroment     Living Arrangement: Lives with spouse  Home Environment: 1-story house/ trailer, tub-shower  Home Safety Concerns: unremarkable    DISCHARGE CAREGIVER/SUPPORT SYSTEM     Identified post-op caregiver: Patient has spouse / significant other.  Patient's caregiver(s) will be able to provide physical assistance. Patient will have someone to assist overnight.      Caregiver present at pre-op interview:  No      PRE-OPERATIVE FUNCTIONAL STATUS     Employment: Retired    Pre-op Functional Status:  Patient is independent with mobility/ambulation, transfers, ADL's, IADL's.    Use of assistive device for ambulation: none  ADL: self care  ADL Limitations: difficulty with walking  Medical Restrictions: None    POTENTIAL BARRIERS TO DISCHARGE/POTENTIAL POST-OP COMPLICATIONS           DISCHARGE PLAN     Expected LOS of 2 days or less for joint replacement discussed with patient. We also discussed a discharge path of HH for approximately two weeks with a transition to outpatient PT on the third week given no post-op complications.      Patient in agreement with discharge plan: Yes    Discharge to: Discharge home with home health (PT/OT) x2 weeks with transition to outpatient PT     HH:  [facility] ([place], [place])     OP PT: [facility] ([place], [place])     Home DME: rolling walker and bedside commode    Needed DME at D/C: tub transfer bench - discussed with patient who requests it be delivered to his room while he is in the hospital.     Rx: Per Dr. Gracia at discharge     Meds to Beds: N/A  Patient expected to discharge on Aspirin 81 mg by mouth twice daily for DVT prophylaxis. Coumadin Clinic Needed: No

## 2020-08-11 NOTE — PRE ADMISSION SCREENING
Patient Name: Manpreet Kerr  YOB: 1945   MRN: [id number]     [facility]   Basic Mobility Inpatient Short Form 6 Clicks         How much difficulty does the patient currently have  Unable  A Lot  A Little  None      1. Turning over in bed (including adjusting bedclothes, sheets and blankets)?     1 []    2 []    3 []    4 [x]        2. Sitting down on and standing up from a chair with arms (e.g., wheelchair, bedside commode, etc.)     1 []  2 []  3 []     4 [x]      3. Moving from lying on back to sitting on the side of the bed?     1 []  2 []  3 []    4 [x]    How much help from another person does the patient currently need  Total  A Lot  A Little  None      4. Moving to and from a bed to a chair (including a wheelchair)?    1 []  2 []  3 []    4 [x]      5. Need to walk in hospital room?    1 []  2 []  3 []    4 [x]      6. Climbing 3-5 steps with a railing?    1 []  2 []  3 []    4 [x]       Raw Score:     24             CMS 0-100% Score:  0.00          %   Standardized Score:   61.14            CMS Modifier:     [facility] AMPAC   Basic Mobility Inpatient Short Form 6 Clicks Score Conversion Table*         *Use this form to convert -PAC Basic Mobility Inpatient Raw Scores.   [facility] Inpatient Basic Mobility Short Form Scoring Example   1. Add the number values associated with the response to each item. For example, items totals yield a Raw Score of 21.   2. Match the raw score to the t-Scale scores (t-Scale score = 50.25, SE = 4.69).   3. Find the associated CMS % (CMS % = 28.97%).   4. Locate the correct CMS Functional Modifier Code, or G Code (G code = CJ)     NOTE: Each -PAC Short Form has a separate conversion table. Make sure that you use the correct conversion table.       Instruction Manual - page 45 contains conversion table

## 2020-08-11 NOTE — PRE ADMISSION SCREENING
"               [facility] Risk Assessment Scale    Patient Name: Manpreet Kerr  YOB: 1945  MRN: [id number]            RIsk Factor Measure Recommendation Patient Data Scale/Score   BMI >40 Reconsider surgery, weight loss   Estimated body mass index is 33.72 kg/m² as calculated from the following:    Height as of this encounter: 5' 10" (1.778 m).    Weight as of this encounter: 106.6 kg (235 lb).   [] 0 = 1 - 24.9  [] 1 = 25-29.9  [x] 2 = 30-34.9  [] 3 = 35-39.9  [] 4 = 40-44.9  [] 5 = 45-99.9   Hemoglobin AIC (if applicable) >9 Delay surgery until DM under control  Refer for:  · Nutrition Therapy  · Exercise   · Medication    Lab Results   Component Value Date    HGBA1C 5.6 10/21/2019       Lab Results   Component Value Date     08/13/2020      [x] 0 = 4.0-5.6  [] 1 = 5.7-6.4  [] 2 = 6.5-6.9  [] 3 = 7.0-7.9  [] 4 = 8.0-8.9  [] 5 = 9.0-12   Hemoglobin (Anemia) <9 Delay surgery   Correct anemia Lab Results   Component Value Date    HGB 16.0 08/13/2020    [] 20 - <9.0                    Albumin <3 Delay surgery &Workup Lab Results   Component Value Date    ALBUMIN 3.4 (L) 03/06/2015    [] 20 - <3.0   Smoking Cessation >4 Weeks Delay Surgery  Refer to OP Cessation Class    Former smoker [] 20 - current smoker                                _____ PPD                    Hx of MI, PE, Arrhythmia, CVA, DVT <30 Days Delay Surgery    N/A [] 20      Infection Variable Delay surgery and re-evaluate   N/A [] 20 - recent/current infection     Depression (PHQ) >10 out of 27 Delay Surgery and re-evaluate  Medication  Counseling              [x] 0     []1     []2     []3      []4      [] 5                    (1-4)      (5-9)  (10-14)  (15-19)   (20-27)     Memory Impairment & Memory loss (Mini-Cog Screening Tool) Advanced dementia and/or Parkinson's Reconsider surgery     [x] 0     []1     []2     []3     []4     [] 5     Physical Conditioning (Modified AM-PAC Per Physical Therapy at [facility]) Unable to ambulate " on day of surgery Delay surgery and re-evaluate  Pre-Rehabilitation   (PT evaluation)       [x]  0   []4       []8     []12        []16     []20       (<20%)   (<40%)   (<60%)   (<80% )    (>80%)     Home Environment/Caregiver support  (Per /Navigator Interview)    Availability of basic services and/or approprate assistance during post-operative period Delay surgery and re-evaluate  Safe home environment  Health   1 week post-surgery  Transportation  availability  Ability to obtain DME/Medications post-op    [x] 0     []1     []2     []3     []4     [] 5  [x] 0     []1     []2     []3     []4     [] 5  [x] 0     []1     []2     []3     []4     [] 5  [x] 0     []1     []2     []3     []4     [] 5         MD Contact:Dr. Garcia  Comments:  Total Score:  2

## 2020-08-13 ENCOUNTER — HOSPITAL ENCOUNTER (OUTPATIENT)
Dept: PREADMISSION TESTING | Facility: HOSPITAL | Age: 75
Discharge: HOME | End: 2020-08-13
Attending: ORTHOPAEDIC SURGERY
Payer: MEDICARE

## 2020-08-13 VITALS — HEIGHT: 70 IN | WEIGHT: 235 LBS | BODY MASS INDEX: 33.64 KG/M2

## 2020-08-13 DIAGNOSIS — Z01.818 PRE-OP TESTING: ICD-10-CM

## 2020-08-13 DIAGNOSIS — Z01.818 PREOP TESTING: Primary | ICD-10-CM

## 2020-08-13 DIAGNOSIS — M17.12 PRIMARY OSTEOARTHRITIS OF LEFT KNEE: ICD-10-CM

## 2020-08-13 LAB
ABO + RH BLD: NORMAL
ANION GAP SERPL CALC-SCNC: 10 MMOL/L (ref 8–16)
BASOPHILS # BLD AUTO: 0.06 K/UL (ref 0–0.2)
BASOPHILS NFR BLD: 1.1 % (ref 0–1.9)
BLD GP AB SCN CELLS X3 SERPL QL: NORMAL
BUN SERPL-MCNC: 15 MG/DL (ref 8–23)
CALCIUM SERPL-MCNC: 9.3 MG/DL (ref 8.7–10.5)
CHLORIDE SERPL-SCNC: 106 MMOL/L (ref 95–110)
CO2 SERPL-SCNC: 24 MMOL/L (ref 23–29)
CREAT SERPL-MCNC: 1 MG/DL (ref 0.5–1.4)
DIFFERENTIAL METHOD: NORMAL
EOSINOPHIL # BLD AUTO: 0.2 K/UL (ref 0–0.5)
EOSINOPHIL NFR BLD: 3.3 % (ref 0–8)
ERYTHROCYTE [DISTWIDTH] IN BLOOD BY AUTOMATED COUNT: 14.5 % (ref 11.5–14.5)
EST. GFR  (AFRICAN AMERICAN): >60 ML/MIN/1.73 M^2
EST. GFR  (NON AFRICAN AMERICAN): >60 ML/MIN/1.73 M^2
GLUCOSE SERPL-MCNC: 105 MG/DL (ref 70–110)
HCT VFR BLD AUTO: 49.2 % (ref 40–54)
HGB BLD-MCNC: 16 G/DL (ref 14–18)
IMM GRANULOCYTES # BLD AUTO: 0.01 K/UL (ref 0–0.04)
IMM GRANULOCYTES NFR BLD AUTO: 0.2 % (ref 0–0.5)
LYMPHOCYTES # BLD AUTO: 1.4 K/UL (ref 1–4.8)
LYMPHOCYTES NFR BLD: 26.2 % (ref 18–48)
MCH RBC QN AUTO: 30.8 PG (ref 27–31)
MCHC RBC AUTO-ENTMCNC: 32.5 G/DL (ref 32–36)
MCV RBC AUTO: 95 FL (ref 82–98)
MONOCYTES # BLD AUTO: 0.5 K/UL (ref 0.3–1)
MONOCYTES NFR BLD: 9.6 % (ref 4–15)
NEUTROPHILS # BLD AUTO: 3.1 K/UL (ref 1.8–7.7)
NEUTROPHILS NFR BLD: 59.6 % (ref 38–73)
NRBC BLD-RTO: 0 /100 WBC
PLATELET # BLD AUTO: 164 K/UL (ref 150–350)
PMV BLD AUTO: 11.1 FL (ref 9.2–12.9)
POTASSIUM SERPL-SCNC: 4 MMOL/L (ref 3.5–5.1)
RBC # BLD AUTO: 5.19 M/UL (ref 4.6–6.2)
SODIUM SERPL-SCNC: 140 MMOL/L (ref 136–145)
WBC # BLD AUTO: 5.22 K/UL (ref 3.9–12.7)

## 2020-08-13 PROCEDURE — 85025 COMPLETE CBC W/AUTO DIFF WBC: CPT

## 2020-08-13 PROCEDURE — 86850 RBC ANTIBODY SCREEN: CPT

## 2020-08-13 PROCEDURE — 36415 COLL VENOUS BLD VENIPUNCTURE: CPT

## 2020-08-13 PROCEDURE — 99900103 DSU ONLY-NO CHARGE-INITIAL HR (STAT)

## 2020-08-13 PROCEDURE — 99900104 DSU ONLY-NO CHARGE-EA ADD'L HR (STAT)

## 2020-08-13 PROCEDURE — 87081 CULTURE SCREEN ONLY: CPT

## 2020-08-13 PROCEDURE — 80048 BASIC METABOLIC PNL TOTAL CA: CPT

## 2020-08-13 NOTE — DISCHARGE INSTRUCTIONS
To confirm, Your doctor has instructed you that surgery is scheduled for:  8/25/20   Leidy [phone]    covid test  8/22/20    Please report to [facility] the morning of surgery. You must check-in and receive a wristband before going to your procedure.    Pre-Op will call the afternoon prior to surgery between 1:00 and 6:00 PM with the final arrival time.  Phone number: [phone]    PLEASE NOTE:  The surgery schedule has many variables which may affect the time of your surgery case.  Family members should be available if your surgery time changes.  Plan to be here the day of your procedure between 4-6 hours.    MEDICATIONS:  TAKE ONLY THESE MEDICATIONS WITH A SMALL SIP OF WATER THE MORNING OF YOUR PROCEDURE:   -0-    DO NOT TAKE THESE MEDICATIONS 5-7 DAYS PRIOR to your procedure or per your surgeon's request: ASPIRIN, ALEVE, ADVIL, IBUPROFEN, FISH OIL VITAMIN E, HERBALS -  Last Dose 8/17/20                                           NO Losartan AM of surgery  (May take Tylenol)    ONLY if you are prescribed any types of blood thinners such as:  Aspirin, Coumadin, Plavix, Pradaxa, Xarelto, Aggrenox, Effient, Eliquis, Savasya, Brilinta, or any other, ask your surgeon whether you should stop taking them and how long before surgery you should stop.  You may also need to verify with the prescribing physician if it is ok to stop your medication.      INSTRUCTIONS IMPORTANT!!  · Do not eat or drink anything between midnight and the time of your procedure- this includes gum, mints, and candy.  · Do not smoke or drink alcoholic beverages 24 hours prior to your procedure.  · Shower the night before AND the morning of your procedure with a Chlorhexidine wash such as Hibiclens or Dial antibacterial soap from the neck down.  Do not get it on your face or in your eyes.  You may use your own shampoo and face wash. This helps your skin to be as bacteria free as possible.    · If you wear  contact lenses, dentures, hearing aids or glasses, bring a container to put them in during surgery and give to a family member for safe keeping.  Please leave all jewelry, piercing's and valuables at home.   · DO NOT remove hair from the surgery site.  Do not shave the incision site unless you are given specific instructions to do so.    · ONLY if you have been diagnosed with sleep apnea please bring your C-PAP machine.  · ONLY if you wear home oxygen please bring your portable oxygen tank the day of your procedure.  · ONLY if you have a history of OPEN HEART SURGERY you will need a clearance from your Cardiologist per Anesthesia.      · ONLY for patients requiring bowel prep, written instructions will be given by your doctor's office.  · ONLY if you have a neuro stimulator, please bring the controller with you the morning of surgery  · ONLY if a type and screen test is needed before surgery, please return:  · If your doctor has scheduled you for an overnight stay, bring a small overnight bag with any personal items you need.  · Make arrangements in advance for transportation home by a responsible adult.  It is not safe to drive a vehicle during the 24 hours after anesthesia.     · ONLY ONE VISITOR PER PATIENT IS ALLOWED TO COME IN THE HOSPITAL THE DAY OF PROCEDURE.   · Visiting hours are 8:00AM-6:00PM. For the safety of all patients, visitors under the age of 12 are not allowed above the first floor of the hospital.    · All [facility] facilities and properties are tobacco free.  Smoking is NOT allowed.       If you have any questions about these instructions, call Pre-Op Admit  Nursing at [phone] or the Pre-Op Day Surgery Unit at [phone].

## 2020-08-15 LAB — MRSA SPEC QL CULT: NORMAL

## 2020-08-21 DIAGNOSIS — M17.12 PRIMARY OSTEOARTHRITIS OF LEFT KNEE: Primary | ICD-10-CM

## 2020-08-22 ENCOUNTER — LAB VISIT (OUTPATIENT)
Dept: PRIMARY CARE CLINIC | Facility: CLINIC | Age: 75
End: 2020-08-22
Payer: MEDICARE

## 2020-08-22 DIAGNOSIS — Z01.818 PRE-OP TESTING: ICD-10-CM

## 2020-08-22 PROCEDURE — U0003 INFECTIOUS AGENT DETECTION BY NUCLEIC ACID (DNA OR RNA); SEVERE ACUTE RESPIRATORY SYNDROME CORONAVIRUS 2 (SARS-COV-2) (CORONAVIRUS DISEASE [COVID-19]), AMPLIFIED PROBE TECHNIQUE, MAKING USE OF HIGH THROUGHPUT TECHNOLOGIES AS DESCRIBED BY CMS-2020-01-R: HCPCS

## 2020-08-23 LAB — SARS-COV-2 RNA RESP QL NAA+PROBE: NOT DETECTED

## 2020-08-24 ENCOUNTER — ANESTHESIA EVENT (OUTPATIENT)
Dept: SURGERY | Facility: HOSPITAL | Age: 75
End: 2020-08-24
Payer: MEDICARE

## 2020-08-25 ENCOUNTER — HOSPITAL ENCOUNTER (OUTPATIENT)
Facility: HOSPITAL | Age: 75
Discharge: HOME HEALTH | End: 2020-08-26
Attending: ORTHOPAEDIC SURGERY | Admitting: HOSPITALIST
Payer: MEDICARE

## 2020-08-25 ENCOUNTER — ANESTHESIA (OUTPATIENT)
Dept: SURGERY | Facility: HOSPITAL | Age: 75
End: 2020-08-25
Payer: MEDICARE

## 2020-08-25 DIAGNOSIS — M17.12 OSTEOARTHRITIS OF LEFT KNEE: ICD-10-CM

## 2020-08-25 DIAGNOSIS — M17.12 PRIMARY OSTEOARTHRITIS OF LEFT KNEE: Primary | ICD-10-CM

## 2020-08-25 PROBLEM — K21.9 GASTROESOPHAGEAL REFLUX DISEASE WITHOUT ESOPHAGITIS: Status: ACTIVE | Noted: 2020-08-25

## 2020-08-25 PROCEDURE — 63600175 PHARM REV CODE 636 W HCPCS: Performed by: ANESTHESIOLOGY

## 2020-08-25 PROCEDURE — 63600175 PHARM REV CODE 636 W HCPCS: Performed by: ORTHOPAEDIC SURGERY

## 2020-08-25 PROCEDURE — 63600175 PHARM REV CODE 636 W HCPCS: Performed by: NURSE ANESTHETIST, CERTIFIED REGISTERED

## 2020-08-25 PROCEDURE — 27201423 OPTIME MED/SURG SUP & DEVICES STERILE SUPPLY: Performed by: ORTHOPAEDIC SURGERY

## 2020-08-25 PROCEDURE — 25000003 PHARM REV CODE 250: Performed by: ANESTHESIOLOGY

## 2020-08-25 PROCEDURE — 36000710: Performed by: ORTHOPAEDIC SURGERY

## 2020-08-25 PROCEDURE — 27447 PR TOTAL KNEE ARTHROPLASTY: ICD-10-PCS | Mod: LT,,, | Performed by: ORTHOPAEDIC SURGERY

## 2020-08-25 PROCEDURE — 25000003 PHARM REV CODE 250: Performed by: ORTHOPAEDIC SURGERY

## 2020-08-25 PROCEDURE — C1776 JOINT DEVICE (IMPLANTABLE): HCPCS | Performed by: ORTHOPAEDIC SURGERY

## 2020-08-25 PROCEDURE — 97161 PT EVAL LOW COMPLEX 20 MIN: CPT

## 2020-08-25 PROCEDURE — 64447 PERIPHERAL BLOCK: ICD-10-PCS | Mod: 59,LT,, | Performed by: ANESTHESIOLOGY

## 2020-08-25 PROCEDURE — 97110 THERAPEUTIC EXERCISES: CPT

## 2020-08-25 PROCEDURE — D9220A PRA ANESTHESIA: ICD-10-PCS | Mod: ANES,,, | Performed by: ANESTHESIOLOGY

## 2020-08-25 PROCEDURE — 99900103 DSU ONLY-NO CHARGE-INITIAL HR (STAT): Performed by: ORTHOPAEDIC SURGERY

## 2020-08-25 PROCEDURE — 71000033 HC RECOVERY, INTIAL HOUR: Performed by: ORTHOPAEDIC SURGERY

## 2020-08-25 PROCEDURE — 64447 NJX AA&/STRD FEMORAL NRV IMG: CPT | Performed by: ANESTHESIOLOGY

## 2020-08-25 PROCEDURE — 76942 PERIPHERAL BLOCK: ICD-10-PCS | Mod: 26,,, | Performed by: ANESTHESIOLOGY

## 2020-08-25 PROCEDURE — 36000711: Performed by: ORTHOPAEDIC SURGERY

## 2020-08-25 PROCEDURE — C1713 ANCHOR/SCREW BN/BN,TIS/BN: HCPCS | Performed by: ORTHOPAEDIC SURGERY

## 2020-08-25 PROCEDURE — 63600175 PHARM REV CODE 636 W HCPCS

## 2020-08-25 PROCEDURE — 99900104 DSU ONLY-NO CHARGE-EA ADD'L HR (STAT): Performed by: ORTHOPAEDIC SURGERY

## 2020-08-25 PROCEDURE — 27200750 HC INSULATED NEEDLE/ STIMUPLEX: Performed by: ANESTHESIOLOGY

## 2020-08-25 PROCEDURE — 94799 UNLISTED PULMONARY SVC/PX: CPT

## 2020-08-25 PROCEDURE — 27447 TOTAL KNEE ARTHROPLASTY: CPT | Mod: LT,,, | Performed by: ORTHOPAEDIC SURGERY

## 2020-08-25 PROCEDURE — 25000003 PHARM REV CODE 250: Performed by: NURSE PRACTITIONER

## 2020-08-25 PROCEDURE — 71000039 HC RECOVERY, EACH ADD'L HOUR: Performed by: ORTHOPAEDIC SURGERY

## 2020-08-25 PROCEDURE — C9290 INJ, BUPIVACAINE LIPOSOME: HCPCS | Performed by: ANESTHESIOLOGY

## 2020-08-25 PROCEDURE — D9220A PRA ANESTHESIA: Mod: ANES,,, | Performed by: ANESTHESIOLOGY

## 2020-08-25 PROCEDURE — D9220A PRA ANESTHESIA: Mod: CRNA,,, | Performed by: NURSE ANESTHETIST, CERTIFIED REGISTERED

## 2020-08-25 PROCEDURE — 94761 N-INVAS EAR/PLS OXIMETRY MLT: CPT

## 2020-08-25 PROCEDURE — 97116 GAIT TRAINING THERAPY: CPT

## 2020-08-25 PROCEDURE — D9220A PRA ANESTHESIA: ICD-10-PCS | Mod: CRNA,,, | Performed by: NURSE ANESTHETIST, CERTIFIED REGISTERED

## 2020-08-25 PROCEDURE — 37000009 HC ANESTHESIA EA ADD 15 MINS: Performed by: ORTHOPAEDIC SURGERY

## 2020-08-25 PROCEDURE — 76942 ECHO GUIDE FOR BIOPSY: CPT | Mod: 26,,, | Performed by: ANESTHESIOLOGY

## 2020-08-25 PROCEDURE — 25000003 PHARM REV CODE 250: Performed by: NURSE ANESTHETIST, CERTIFIED REGISTERED

## 2020-08-25 PROCEDURE — 37000008 HC ANESTHESIA 1ST 15 MINUTES: Performed by: ORTHOPAEDIC SURGERY

## 2020-08-25 PROCEDURE — S0020 INJECTION, BUPIVICAINE HYDRO: HCPCS | Performed by: ANESTHESIOLOGY

## 2020-08-25 DEVICE — BASEPLATE TIB CEM PRIM SZ 6: Type: IMPLANTABLE DEVICE | Site: KNEE | Status: FUNCTIONAL

## 2020-08-25 DEVICE — PATELLA TRIATHLON 33X9 SYMTRC: Type: IMPLANTABLE DEVICE | Site: KNEE | Status: FUNCTIONAL

## 2020-08-25 DEVICE — CEMENT BONE SURG SMPLX P RADPQ: Type: IMPLANTABLE DEVICE | Site: KNEE | Status: FUNCTIONAL

## 2020-08-25 RX ORDER — OXYCODONE HCL 10 MG/1
10 TABLET, FILM COATED, EXTENDED RELEASE ORAL
Status: COMPLETED | OUTPATIENT
Start: 2020-08-25 | End: 2020-08-25

## 2020-08-25 RX ORDER — NAPROXEN SODIUM 220 MG/1
81 TABLET, FILM COATED ORAL 2 TIMES DAILY
Status: DISCONTINUED | OUTPATIENT
Start: 2020-08-25 | End: 2020-08-26 | Stop reason: HOSPADM

## 2020-08-25 RX ORDER — FAMOTIDINE 20 MG/1
20 TABLET, FILM COATED ORAL 2 TIMES DAILY
Status: DISCONTINUED | OUTPATIENT
Start: 2020-08-25 | End: 2020-08-26 | Stop reason: HOSPADM

## 2020-08-25 RX ORDER — DIPHENHYDRAMINE HYDROCHLORIDE 50 MG/ML
25 INJECTION INTRAMUSCULAR; INTRAVENOUS EVERY 6 HOURS PRN
Status: DISCONTINUED | OUTPATIENT
Start: 2020-08-25 | End: 2020-08-25 | Stop reason: HOSPADM

## 2020-08-25 RX ORDER — LOPERAMIDE HYDROCHLORIDE 2 MG/1
2 CAPSULE ORAL CONTINUOUS PRN
Status: DISCONTINUED | OUTPATIENT
Start: 2020-08-25 | End: 2020-08-26 | Stop reason: HOSPADM

## 2020-08-25 RX ORDER — DIPHENHYDRAMINE HYDROCHLORIDE 50 MG/ML
12.5 INJECTION INTRAMUSCULAR; INTRAVENOUS
Status: DISCONTINUED | OUTPATIENT
Start: 2020-08-25 | End: 2020-08-26 | Stop reason: HOSPADM

## 2020-08-25 RX ORDER — MIDAZOLAM HYDROCHLORIDE 1 MG/ML
INJECTION, SOLUTION INTRAMUSCULAR; INTRAVENOUS
Status: DISCONTINUED | OUTPATIENT
Start: 2020-08-25 | End: 2020-08-25

## 2020-08-25 RX ORDER — ZOLPIDEM TARTRATE 5 MG/1
5 TABLET ORAL NIGHTLY PRN
Status: DISCONTINUED | OUTPATIENT
Start: 2020-08-25 | End: 2020-08-26 | Stop reason: HOSPADM

## 2020-08-25 RX ORDER — CEFAZOLIN SODIUM 2 G/50ML
2 SOLUTION INTRAVENOUS
Status: COMPLETED | OUTPATIENT
Start: 2020-08-25 | End: 2020-08-25

## 2020-08-25 RX ORDER — ACETAMINOPHEN 10 MG/ML
1000 INJECTION, SOLUTION INTRAVENOUS ONCE
Status: COMPLETED | OUTPATIENT
Start: 2020-08-25 | End: 2020-08-25

## 2020-08-25 RX ORDER — CELECOXIB 100 MG/1
100 CAPSULE ORAL EVERY 12 HOURS
Status: DISCONTINUED | OUTPATIENT
Start: 2020-08-27 | End: 2020-08-26 | Stop reason: HOSPADM

## 2020-08-25 RX ORDER — SODIUM CHLORIDE 0.9 % (FLUSH) 0.9 %
3 SYRINGE (ML) INJECTION EVERY 8 HOURS
Status: DISCONTINUED | OUTPATIENT
Start: 2020-08-25 | End: 2020-08-25 | Stop reason: HOSPADM

## 2020-08-25 RX ORDER — SODIUM CHLORIDE 0.9 % (FLUSH) 0.9 %
3 SYRINGE (ML) INJECTION
Status: DISCONTINUED | OUTPATIENT
Start: 2020-08-25 | End: 2020-08-25 | Stop reason: HOSPADM

## 2020-08-25 RX ORDER — PREGABALIN 75 MG/1
75 CAPSULE ORAL ONCE
Status: COMPLETED | OUTPATIENT
Start: 2020-08-25 | End: 2020-08-25

## 2020-08-25 RX ORDER — DEXAMETHASONE SODIUM PHOSPHATE 4 MG/ML
INJECTION, SOLUTION INTRA-ARTICULAR; INTRALESIONAL; INTRAMUSCULAR; INTRAVENOUS; SOFT TISSUE
Status: DISCONTINUED | OUTPATIENT
Start: 2020-08-25 | End: 2020-08-25

## 2020-08-25 RX ORDER — OXYCODONE HYDROCHLORIDE 10 MG/1
10 TABLET ORAL
Status: DISCONTINUED | OUTPATIENT
Start: 2020-08-25 | End: 2020-08-26 | Stop reason: HOSPADM

## 2020-08-25 RX ORDER — FENTANYL CITRATE 50 UG/ML
25 INJECTION, SOLUTION INTRAMUSCULAR; INTRAVENOUS EVERY 5 MIN PRN
Status: DISCONTINUED | OUTPATIENT
Start: 2020-08-25 | End: 2020-08-25 | Stop reason: HOSPADM

## 2020-08-25 RX ORDER — ACETAMINOPHEN 500 MG
1000 TABLET ORAL EVERY 6 HOURS
Status: DISCONTINUED | OUTPATIENT
Start: 2020-08-25 | End: 2020-08-26 | Stop reason: HOSPADM

## 2020-08-25 RX ORDER — ONDANSETRON 2 MG/ML
4 INJECTION INTRAMUSCULAR; INTRAVENOUS DAILY PRN
Status: DISCONTINUED | OUTPATIENT
Start: 2020-08-25 | End: 2020-08-25 | Stop reason: HOSPADM

## 2020-08-25 RX ORDER — CEFAZOLIN SODIUM 2 G/50ML
2 SOLUTION INTRAVENOUS
Status: COMPLETED | OUTPATIENT
Start: 2020-08-25 | End: 2020-08-26

## 2020-08-25 RX ORDER — LIDOCAINE HYDROCHLORIDE 10 MG/ML
1 INJECTION, SOLUTION EPIDURAL; INFILTRATION; INTRACAUDAL; PERINEURAL ONCE
Status: DISCONTINUED | OUTPATIENT
Start: 2020-08-25 | End: 2020-08-25 | Stop reason: HOSPADM

## 2020-08-25 RX ORDER — SODIUM CHLORIDE, SODIUM LACTATE, POTASSIUM CHLORIDE, CALCIUM CHLORIDE 600; 310; 30; 20 MG/100ML; MG/100ML; MG/100ML; MG/100ML
500 INJECTION, SOLUTION INTRAVENOUS ONCE
Status: DISCONTINUED | OUTPATIENT
Start: 2020-08-25 | End: 2020-08-25 | Stop reason: HOSPADM

## 2020-08-25 RX ORDER — DOCUSATE SODIUM 100 MG/1
100 CAPSULE, LIQUID FILLED ORAL EVERY 12 HOURS
Status: DISCONTINUED | OUTPATIENT
Start: 2020-08-25 | End: 2020-08-26 | Stop reason: HOSPADM

## 2020-08-25 RX ORDER — MIDAZOLAM HYDROCHLORIDE 1 MG/ML
0.5 INJECTION INTRAMUSCULAR; INTRAVENOUS
Status: DISCONTINUED | OUTPATIENT
Start: 2020-08-25 | End: 2020-08-25 | Stop reason: HOSPADM

## 2020-08-25 RX ORDER — DEXTROSE MONOHYDRATE AND SODIUM CHLORIDE 5; .9 G/100ML; G/100ML
INJECTION, SOLUTION INTRAVENOUS CONTINUOUS
Status: DISCONTINUED | OUTPATIENT
Start: 2020-08-25 | End: 2020-08-26 | Stop reason: HOSPADM

## 2020-08-25 RX ORDER — DIPHENHYDRAMINE HCL 50 MG
50 CAPSULE ORAL EVERY 6 HOURS PRN
COMMUNITY
End: 2024-03-28

## 2020-08-25 RX ORDER — MORPHINE SULFATE 2 MG/ML
2 INJECTION, SOLUTION INTRAMUSCULAR; INTRAVENOUS
Status: DISCONTINUED | OUTPATIENT
Start: 2020-08-25 | End: 2020-08-26 | Stop reason: HOSPADM

## 2020-08-25 RX ORDER — MUPIROCIN 20 MG/G
OINTMENT TOPICAL
Status: DISCONTINUED | OUTPATIENT
Start: 2020-08-25 | End: 2020-08-25 | Stop reason: HOSPADM

## 2020-08-25 RX ORDER — HYDROMORPHONE HYDROCHLORIDE 2 MG/ML
0.2 INJECTION, SOLUTION INTRAMUSCULAR; INTRAVENOUS; SUBCUTANEOUS EVERY 5 MIN PRN
Status: DISCONTINUED | OUTPATIENT
Start: 2020-08-25 | End: 2020-08-25 | Stop reason: HOSPADM

## 2020-08-25 RX ORDER — PREGABALIN 75 MG/1
75 CAPSULE ORAL 2 TIMES DAILY
Status: DISCONTINUED | OUTPATIENT
Start: 2020-08-25 | End: 2020-08-25 | Stop reason: SDUPTHER

## 2020-08-25 RX ORDER — OXYCODONE HYDROCHLORIDE 5 MG/1
5 TABLET ORAL
Status: DISCONTINUED | OUTPATIENT
Start: 2020-08-25 | End: 2020-08-26 | Stop reason: HOSPADM

## 2020-08-25 RX ORDER — SODIUM CHLORIDE 0.9 % (FLUSH) 0.9 %
10 SYRINGE (ML) INJECTION
Status: DISCONTINUED | OUTPATIENT
Start: 2020-08-25 | End: 2020-08-26 | Stop reason: HOSPADM

## 2020-08-25 RX ORDER — CELECOXIB 100 MG/1
200 CAPSULE ORAL ONCE
Status: COMPLETED | OUTPATIENT
Start: 2020-08-25 | End: 2020-08-25

## 2020-08-25 RX ORDER — MUPIROCIN 20 MG/G
1 OINTMENT TOPICAL 2 TIMES DAILY
Status: DISCONTINUED | OUTPATIENT
Start: 2020-08-25 | End: 2020-08-26 | Stop reason: HOSPADM

## 2020-08-25 RX ORDER — CELECOXIB 100 MG/1
200 CAPSULE ORAL NIGHTLY
Status: COMPLETED | OUTPATIENT
Start: 2020-08-25 | End: 2020-08-25

## 2020-08-25 RX ORDER — TRANEXAMIC ACID 100 MG/ML
INJECTION, SOLUTION INTRAVENOUS
Status: DISCONTINUED | OUTPATIENT
Start: 2020-08-25 | End: 2020-08-25

## 2020-08-25 RX ORDER — OXYCODONE HYDROCHLORIDE 5 MG/1
5 TABLET ORAL
Status: DISCONTINUED | OUTPATIENT
Start: 2020-08-25 | End: 2020-08-25 | Stop reason: HOSPADM

## 2020-08-25 RX ORDER — PREGABALIN 75 MG/1
75 CAPSULE ORAL EVERY 12 HOURS
Status: DISCONTINUED | OUTPATIENT
Start: 2020-08-25 | End: 2020-08-26 | Stop reason: HOSPADM

## 2020-08-25 RX ORDER — PROPOFOL 10 MG/ML
VIAL (ML) INTRAVENOUS CONTINUOUS PRN
Status: DISCONTINUED | OUTPATIENT
Start: 2020-08-25 | End: 2020-08-25

## 2020-08-25 RX ORDER — ONDANSETRON 2 MG/ML
4 INJECTION INTRAMUSCULAR; INTRAVENOUS EVERY 12 HOURS PRN
Status: DISCONTINUED | OUTPATIENT
Start: 2020-08-25 | End: 2020-08-25 | Stop reason: SDUPTHER

## 2020-08-25 RX ORDER — CELECOXIB 100 MG/1
200 CAPSULE ORAL ONCE
Status: DISCONTINUED | OUTPATIENT
Start: 2020-08-25 | End: 2020-08-25

## 2020-08-25 RX ORDER — MORPHINE SULFATE 2 MG/ML
2 INJECTION, SOLUTION INTRAMUSCULAR; INTRAVENOUS
Status: DISCONTINUED | OUTPATIENT
Start: 2020-08-25 | End: 2020-08-26

## 2020-08-25 RX ORDER — ACETAMINOPHEN 325 MG/1
650 TABLET ORAL EVERY 6 HOURS
Status: DISCONTINUED | OUTPATIENT
Start: 2020-08-26 | End: 2020-08-25 | Stop reason: SDUPTHER

## 2020-08-25 RX ORDER — OXYCODONE HCL 10 MG/1
10 TABLET, FILM COATED, EXTENDED RELEASE ORAL EVERY 12 HOURS
Status: DISCONTINUED | OUTPATIENT
Start: 2020-08-25 | End: 2020-08-26 | Stop reason: HOSPADM

## 2020-08-25 RX ORDER — ONDANSETRON 2 MG/ML
4 INJECTION INTRAMUSCULAR; INTRAVENOUS EVERY 12 HOURS PRN
Status: DISCONTINUED | OUTPATIENT
Start: 2020-08-25 | End: 2020-08-26 | Stop reason: HOSPADM

## 2020-08-25 RX ORDER — OXYCODONE HCL 10 MG/1
10 TABLET, FILM COATED, EXTENDED RELEASE ORAL EVERY 12 HOURS
Status: DISCONTINUED | OUTPATIENT
Start: 2020-08-25 | End: 2020-08-25 | Stop reason: SDUPTHER

## 2020-08-25 RX ORDER — BUPIVACAINE HYDROCHLORIDE 5 MG/ML
INJECTION, SOLUTION EPIDURAL; INTRACAUDAL
Status: DISCONTINUED | OUTPATIENT
Start: 2020-08-25 | End: 2020-08-25

## 2020-08-25 RX ORDER — MONTELUKAST SODIUM 10 MG/1
10 TABLET ORAL DAILY
Status: DISCONTINUED | OUTPATIENT
Start: 2020-08-26 | End: 2020-08-26 | Stop reason: HOSPADM

## 2020-08-25 RX ORDER — AZELASTINE 1 MG/ML
2 SPRAY, METERED NASAL 2 TIMES DAILY
Status: DISCONTINUED | OUTPATIENT
Start: 2020-08-25 | End: 2020-08-26 | Stop reason: HOSPADM

## 2020-08-25 RX ORDER — ACETAMINOPHEN 325 MG/1
325 TABLET ORAL EVERY 6 HOURS PRN
COMMUNITY
End: 2024-03-28

## 2020-08-25 RX ORDER — ONDANSETRON 2 MG/ML
INJECTION INTRAMUSCULAR; INTRAVENOUS
Status: DISCONTINUED | OUTPATIENT
Start: 2020-08-25 | End: 2020-08-25

## 2020-08-25 RX ORDER — LOSARTAN POTASSIUM 25 MG/1
25 TABLET ORAL DAILY
Status: DISCONTINUED | OUTPATIENT
Start: 2020-08-26 | End: 2020-08-26 | Stop reason: HOSPADM

## 2020-08-25 RX ORDER — METHOCARBAMOL 500 MG/1
500 TABLET, FILM COATED ORAL 3 TIMES DAILY
Status: DISCONTINUED | OUTPATIENT
Start: 2020-08-25 | End: 2020-08-26 | Stop reason: HOSPADM

## 2020-08-25 RX ORDER — CELECOXIB 100 MG/1
100 CAPSULE ORAL DAILY
Status: DISCONTINUED | OUTPATIENT
Start: 2020-08-26 | End: 2020-08-25

## 2020-08-25 RX ADMIN — ACETAMINOPHEN 1000 MG: 10 INJECTION, SOLUTION INTRAVENOUS at 02:08

## 2020-08-25 RX ADMIN — FAMOTIDINE 20 MG: 20 TABLET, FILM COATED ORAL at 08:08

## 2020-08-25 RX ADMIN — PREGABALIN 75 MG: 75 CAPSULE ORAL at 08:08

## 2020-08-25 RX ADMIN — DEXTROSE AND SODIUM CHLORIDE: 5; .9 INJECTION, SOLUTION INTRAVENOUS at 10:08

## 2020-08-25 RX ADMIN — TRANEXAMIC ACID 1000 MG: 100 INJECTION, SOLUTION INTRAVENOUS at 10:08

## 2020-08-25 RX ADMIN — ASPIRIN 81 MG 81 MG: 81 TABLET ORAL at 08:08

## 2020-08-25 RX ADMIN — SODIUM CHLORIDE, SODIUM GLUCONATE, SODIUM ACETATE, POTASSIUM CHLORIDE, MAGNESIUM CHLORIDE, SODIUM PHOSPHATE, DIBASIC, AND POTASSIUM PHOSPHATE: .53; .5; .37; .037; .03; .012; .00082 INJECTION, SOLUTION INTRAVENOUS at 07:08

## 2020-08-25 RX ADMIN — PROPOFOL 50 MCG/KG/MIN: 10 INJECTION, EMULSION INTRAVENOUS at 09:08

## 2020-08-25 RX ADMIN — MUPIROCIN 1 G: 20 OINTMENT TOPICAL at 08:08

## 2020-08-25 RX ADMIN — FENTANYL CITRATE 50 MCG: 50 INJECTION, SOLUTION INTRAMUSCULAR; INTRAVENOUS at 08:08

## 2020-08-25 RX ADMIN — FENTANYL CITRATE 50 MCG: 50 INJECTION, SOLUTION INTRAMUSCULAR; INTRAVENOUS at 09:08

## 2020-08-25 RX ADMIN — ROPIVACAINE HYDROCHLORIDE: 5 INJECTION, SOLUTION EPIDURAL; INFILTRATION; PERINEURAL at 09:08

## 2020-08-25 RX ADMIN — ACETAMINOPHEN 1000 MG: 10 INJECTION, SOLUTION INTRAVENOUS at 07:08

## 2020-08-25 RX ADMIN — MIDAZOLAM 2 MG: 1 INJECTION INTRAMUSCULAR; INTRAVENOUS at 08:08

## 2020-08-25 RX ADMIN — METHOCARBAMOL TABLETS 500 MG: 500 TABLET, COATED ORAL at 08:08

## 2020-08-25 RX ADMIN — CEFAZOLIN SODIUM 2 G: 2 SOLUTION INTRAVENOUS at 06:08

## 2020-08-25 RX ADMIN — OXYCODONE HYDROCHLORIDE 10 MG: 10 TABLET, FILM COATED, EXTENDED RELEASE ORAL at 07:08

## 2020-08-25 RX ADMIN — ONDANSETRON 4 MG: 2 INJECTION, SOLUTION INTRAMUSCULAR; INTRAVENOUS at 09:08

## 2020-08-25 RX ADMIN — PREGABALIN 75 MG: 75 CAPSULE ORAL at 07:08

## 2020-08-25 RX ADMIN — DOCUSATE SODIUM 100 MG: 100 CAPSULE, LIQUID FILLED ORAL at 08:08

## 2020-08-25 RX ADMIN — BUPIVACAINE HYDROCHLORIDE 10 ML: 5 INJECTION, SOLUTION EPIDURAL; INTRACAUDAL; PERINEURAL at 08:08

## 2020-08-25 RX ADMIN — CELECOXIB 200 MG: 100 CAPSULE ORAL at 07:08

## 2020-08-25 RX ADMIN — DEXAMETHASONE SODIUM PHOSPHATE 4 MG: 4 INJECTION, SOLUTION INTRA-ARTICULAR; INTRALESIONAL; INTRAMUSCULAR; INTRAVENOUS; SOFT TISSUE at 09:08

## 2020-08-25 RX ADMIN — BUPIVACAINE 20 ML: 13.3 INJECTION, SUSPENSION, LIPOSOMAL INFILTRATION at 08:08

## 2020-08-25 RX ADMIN — SODIUM CHLORIDE, SODIUM GLUCONATE, SODIUM ACETATE, POTASSIUM CHLORIDE, MAGNESIUM CHLORIDE, SODIUM PHOSPHATE, DIBASIC, AND POTASSIUM PHOSPHATE: .53; .5; .37; .037; .03; .012; .00082 INJECTION, SOLUTION INTRAVENOUS at 09:08

## 2020-08-25 RX ADMIN — MUPIROCIN: 20 OINTMENT TOPICAL at 07:08

## 2020-08-25 RX ADMIN — TRANEXAMIC ACID 1000 MG: 100 INJECTION, SOLUTION INTRAVENOUS at 09:08

## 2020-08-25 RX ADMIN — CELECOXIB 200 MG: 100 CAPSULE ORAL at 08:08

## 2020-08-25 RX ADMIN — ACETAMINOPHEN 1000 MG: 500 TABLET ORAL at 08:08

## 2020-08-25 RX ADMIN — AZELASTINE HYDROCHLORIDE 274 MCG: 137 SPRAY, METERED NASAL at 08:08

## 2020-08-25 RX ADMIN — OXYCODONE HYDROCHLORIDE 10 MG: 10 TABLET, FILM COATED, EXTENDED RELEASE ORAL at 08:08

## 2020-08-25 RX ADMIN — DEXTROSE AND SODIUM CHLORIDE: 5; .9 INJECTION, SOLUTION INTRAVENOUS at 12:08

## 2020-08-25 RX ADMIN — METHOCARBAMOL TABLETS 500 MG: 500 TABLET, COATED ORAL at 02:08

## 2020-08-25 RX ADMIN — CEFAZOLIN SODIUM 2 G: 2 SOLUTION INTRAVENOUS at 09:08

## 2020-08-25 NOTE — HPI
This is a 74-year-old male who has a past medical history of BPH, hypertension, hyperlipidemia, GERD, and osteoarthritis of the left knee.  Patient is status post left knee arthroplasty done by Dr. Radford today.  Patient tolerated procedure well and is currently post procedure.

## 2020-08-25 NOTE — SUBJECTIVE & OBJECTIVE
Past Medical History:   Diagnosis Date    Allergy     Arthritis     BPH with obstruction/lower urinary tract symptoms     Environmental allergies     H/O prostatitis     History of urinary retention     [place] (hard of hearing)     WEARS BILATERAL HEARING AIDS    [place]hard of hearing)     wears hearing aids    Hypertension     Sleep apnea     Urinary tract infection        Past Surgical History:   Procedure Laterality Date    HERNIA REPAIR      INGUNAL     TRANSRECTAL ULTRASOUND EXAMINATION N/A 8/12/2019    Procedure: TRANSRECTAL ULTRASOUND;  Surgeon: Latia Yoon MD;  Location: [facility] OR;  Service: Urology;  Laterality: N/A;    VASECTOMY      WISDOM TOOTH EXTRACTION         Review of patient's allergies indicates:   Allergen Reactions    Codeine Hives    Morphine Hives    Sulfa (sulfonamide antibiotics) Hives and Itching       No current facility-administered medications on file prior to encounter.      Current Outpatient Medications on File Prior to Encounter   Medication Sig    acetaminophen (TYLENOL) 325 MG tablet Take 325 mg by mouth every 6 (six) hours as needed for Pain.    ANDROGEL 20.25 mg/1.25 gram (1.62 %) GlPm     azelastine (ASTELIN) 137 mcg nasal spray 2 sprays (274 mcg total) by Nasal route 2 (two) times daily.    diclofenac sodium (VOLTAREN) 1 % Gel     diphenhydrAMINE (BENADRYL) 50 MG capsule Take 50 mg by mouth every 6 (six) hours as needed for Itching.    fluocinolone (DERMA-SMOOTHE) 0.01 % external oil Apply topically 3 (three) times daily. Apply to damp scalp at bedtime, wash off qam, avoid chronic use.    fluticasone propionate (FLONASE) 50 mcg/actuation nasal spray     losartan (COZAAR) 100 MG tablet     montelukast (SINGULAIR) 10 mg tablet TAKE 1 TABLET DAILY    omeprazole (PRILOSEC) 10 MG capsule Take 10 mg by mouth once daily.    aspirin (ECOTRIN) 81 MG EC tablet Take 81 mg by mouth once daily.    tadalafiL (CIALIS) 5 MG tablet Take 1 tablet (5 mg) by  mouth daily as needed for Erectile Dysfunction.    triamcinolone acetonide 0.1% (KENALOG) 0.1 % cream aaa bid x 2 weeks then prn, avoid chronic use    [DISCONTINUED] CRESTOR 10 mg tablet Take 10 mg by mouth once daily.      Family History     Problem Relation (Age of Onset)    Cancer Father    Dementia Mother    Heart disease Father        Tobacco Use    Smoking status: Former Smoker     Packs/day: 1.00     Years: 10.00     Pack years: 10.00     Quit date: 4/3/1974     Years since quittin.4    Smokeless tobacco: Never Used   Substance and Sexual Activity    Alcohol use: No    Drug use: No    Sexual activity: Not on file     Review of Systems   Reason unable to perform ROS: Sleepy postop.   Constitutional: Negative for chills, fatigue and fever.   HENT: Positive for hearing loss. Negative for ear pain and facial swelling.    Eyes: Negative for pain and redness.   Respiratory: Negative for cough and shortness of breath.    Cardiovascular: Negative for chest pain, palpitations and leg swelling.   Gastrointestinal: Negative for abdominal distention, abdominal pain, blood in stool, constipation, diarrhea, nausea and vomiting.   Endocrine: Negative for polydipsia and polyphagia.   Genitourinary: Negative for difficulty urinating, dysuria, flank pain and hematuria.   Musculoskeletal: Negative for neck pain and neck stiffness.   Skin: Positive for wound. Negative for color change.        Left knee dressing   Allergic/Immunologic: Negative for food allergies.   Neurological: Positive for numbness. Negative for facial asymmetry and speech difficulty.        Can't feel or move BLE   Hematological: Does not bruise/bleed easily.   Psychiatric/Behavioral: Negative for agitation, behavioral problems, confusion and suicidal ideas. The patient is not nervous/anxious.      Objective:     Vital Signs (Most Recent):  Temp: 97.3 °F (36.3 °C) (20 1232)  Pulse: 61 (20 1400)  Resp: 16 (20 1400)  BP: (!) 141/72  (08/25/20 1232)  SpO2: 96 % (08/25/20 1400) Vital Signs (24h Range):  Temp:  [97.3 °F (36.3 °C)-98.1 °F (36.7 °C)] 97.3 °F (36.3 °C)  Pulse:  [52-70] 61  Resp:  [10-20] 16  SpO2:  [91 %-99 %] 96 %  BP: (104-141)/(57-85) 141/72     Weight: 106.1 kg (234 lb)  Body mass index is 33.58 kg/m².    Physical Exam  Constitutional:       General: He is not in acute distress.     Appearance: Normal appearance. He is not ill-appearing.      Comments: Sleepy but arousable   HENT:      Head: Normocephalic and atraumatic.      Mouth/Throat:      Mouth: Mucous membranes are dry.   Eyes:      General:         Right eye: No discharge.         Left eye: No discharge.      Extraocular Movements: Extraocular movements intact.      Conjunctiva/sclera: Conjunctivae normal.      Pupils: Pupils are equal, round, and reactive to light.   Neck:      Musculoskeletal: Normal range of motion and neck supple.      Vascular: No carotid bruit.   Cardiovascular:      Rate and Rhythm: Normal rate and regular rhythm.      Heart sounds: Normal heart sounds. No murmur.   Pulmonary:      Effort: Pulmonary effort is normal.      Breath sounds: Normal breath sounds. No wheezing or rhonchi.   Abdominal:      General: Bowel sounds are normal. There is no distension.      Tenderness: There is no abdominal tenderness. There is no guarding.   Genitourinary:     Comments: Not examined  Musculoskeletal:         General: No swelling.      Right lower leg: No edema.      Left lower leg: No edema.      Comments: LLE ROM not tested   Lymphadenopathy:      Cervical: No cervical adenopathy.   Skin:     General: Skin is warm and dry.      Capillary Refill: Capillary refill takes 2 to 3 seconds.      Comments: LLE dressing intact   Neurological:      General: No focal deficit present.   Psychiatric:         Mood and Affect: Mood normal.         Behavior: Behavior normal.         Thought Content: Thought content normal.         Judgment: Judgment normal.           CRANIAL  NERVES     CN III, IV, VI   Pupils are equal, round, and reactive to light.            Results for orders placed or performed in visit on 08/22/20   COVID-19 Routine Screening   Result Value Ref Range    SARS-CoV2 (COVID-19) Qualitative PCR Not Detected Not Detected

## 2020-08-25 NOTE — RESPIRATORY THERAPY
08/25/20 1346   Patient Assessment/Suction   Level of Consciousness (AVPU) alert   Respiratory Effort Normal;Unlabored   Expansion/Accessory Muscles/Retractions no use of accessory muscles;no retractions;expansion symmetric   YAW Breath Sounds clear;equal bilaterally   Rhythm/Pattern, Respiratory unlabored;pattern regular;depth regular   Cough Frequency no cough   PRE-TX-O2   O2 Device (Oxygen Therapy) room air   SpO2 96 %   Pulse Oximetry Type Intermittent   $ Pulse Oximetry - Multiple Charge Pulse Oximetry - Multiple   Pulse 60   Resp 16   Positioning [facility] elevated 90 degrees

## 2020-08-25 NOTE — ANESTHESIA PROCEDURE NOTES
Spinal    Diagnosis: Knee left DJD  Patient location during procedure: OR  Start time: 8/25/2020 9:20 AM  Timeout: 8/25/2020 9:20 AM  End time: 8/25/2020 9:26 AM    Staffing  Authorizing Provider: Alex Warren MD  Performing Provider: Alex Warren MD    Preanesthetic Checklist  Completed: patient identified, site marked, surgical consent, pre-op evaluation, timeout performed, IV checked, risks and benefits discussed and monitors and equipment checked  Spinal Block  Patient position: sitting  Prep: ChloraPrep  Patient monitoring: cardiac monitor, continuous pulse ox, continuous capnometry and frequent blood pressure checks  Approach: midline  Location: L3-4  Injection technique: single shot  CSF Fluid: clear free-flowing CSF  Needle  Needle type: pencil-tip   Needle gauge: 25 G  Needle length: 3.5 in  Additional Documentation: incremental injection, negative aspiration for heme and no paresthesia on injection  Needle localization: anatomical landmarks  Assessment  Sensory level: T8   Dermatomal levels determined by alcohol wipe  Ease of block: moderate  Patient's tolerance of the procedure: comfortable throughout block and no complaints  Additional Notes  SAB isobaric 3cc 0.5% Bup

## 2020-08-25 NOTE — TRANSFER OF CARE
"Anesthesia Transfer of Care Note    Patient: Manpreet Kerr    Procedure(s) Performed: Procedure(s) (LRB):  ARTHROPLASTY, KNEE (Left)    Patient location: PACU    Anesthesia Type: spinal and MAC    Transport from OR: Transported from OR on 2-3 L/min O2 by NC with adequate spontaneous ventilation    Post pain: adequate analgesia    Post assessment: no apparent anesthetic complications and tolerated procedure well    Post vital signs: stable    Level of consciousness: awake, alert and oriented    Nausea/Vomiting: no nausea/vomiting    Complications: none    Transfer of care protocol was followed      Last vitals:   Visit Vitals  /73 (BP Location: Left arm, Patient Position: Lying)   Pulse 65   Temp 36.7 °C (98.1 °F)   Resp 11   Ht 5' 10" (1.778 m)   Wt 106.1 kg (234 lb)   SpO2 (!) 94%   BMI 33.58 kg/m²     "

## 2020-08-25 NOTE — NURSING
Pt urinated all over bed due to still being numb from spinal block. Pt saturated dressing. Abd and fluff removed and dressing in place.

## 2020-08-25 NOTE — ANESTHESIA PROCEDURE NOTES
Peripheral Block    Patient location during procedure: pre-op   Block not for primary anesthetic.  Reason for block: at surgeon's request and post-op pain management   Post-op Pain Location: knee left  Start time: 8/25/2020 8:09 AM  Timeout: 8/25/2020 8:08 AM   End time: 8/25/2020 8:13 AM    Staffing  Authorizing Provider: Alex Warren MD  Performing Provider: Alex Warren MD    Preanesthetic Checklist  Completed: patient identified, site marked, surgical consent, pre-op evaluation, timeout performed, IV checked, risks and benefits discussed and monitors and equipment checked  Peripheral Block  Patient position: supine  Prep: ChloraPrep  Patient monitoring: heart rate, cardiac monitor, continuous pulse ox, continuous capnometry and frequent blood pressure checks  Block type: adductor canal  Laterality: left  Injection technique: single shot  Needle  Needle type: Stimuplex   Needle gauge: 21 G  Needle length: 4 in  Needle localization: anatomical landmarks and ultrasound guidance   -ultrasound image captured on disc.  Assessment  Injection assessment: negative aspiration, negative parasthesia and local visualized surrounding nerve  Paresthesia pain: none  Heart rate change: no  Slow fractionated injection: yes  Additional Notes  VSS.  [facility] RN monitoring vitals throughout procedure.  Patient tolerated procedure well.

## 2020-08-25 NOTE — PLAN OF CARE
Cm unable to complete the assessment. PT performing evaluation at this time.  Cm will follow-up in the am.       08/25/20 7823   Discharge Assessment   Assessment Type Discharge Planning Assessment

## 2020-08-25 NOTE — ANESTHESIA POSTPROCEDURE EVALUATION
Anesthesia Post Evaluation    Patient: Manpreet Kerr    Procedure(s) Performed: Procedure(s) (LRB):  ARTHROPLASTY, KNEE (Left)    Final Anesthesia Type: spinal    Patient location during evaluation: PACU  Patient participation: Yes- Able to Participate  Level of consciousness: awake and alert and oriented  Post-procedure vital signs: reviewed and stable  Pain management: adequate  Airway patency: patent    PONV status at discharge: No PONV  Anesthetic complications: no      Cardiovascular status: blood pressure returned to baseline and stable  Respiratory status: unassisted and spontaneous ventilation  Hydration status: euvolemic  Follow-up not needed.          Vitals Value Taken Time   /72 08/25/20 1232   Temp 36.3 °C (97.3 °F) 08/25/20 1232   Pulse 61 08/25/20 1400   Resp 16 08/25/20 1400   SpO2 96 % 08/25/20 1400         Event Time   Out of Recovery 12:25:26         Pain/Ирина Score: Pain Rating Prior to Med Admin: 0 (8/25/2020  2:33 PM)  Ирина Score: 9 (8/25/2020 12:15 PM)

## 2020-08-25 NOTE — PLAN OF CARE
Pt prepared for surgery resting in bed. Spouse signed up for text messaging will bring her in when block is done. IV and PO meds given. Clipped and wiped knee. Warm blankets provided.

## 2020-08-25 NOTE — H&P
Past Medical History:   Diagnosis Date    Allergy      Arthritis      BPH with obstruction/lower urinary tract symptoms      Environmental allergies      H/O prostatitis      History of urinary retention      Hypertension      Urinary tract infection                 Past Surgical History:   Procedure Laterality Date    HERNIA REPAIR         INGUNAL     TRANSRECTAL ULTRASOUND EXAMINATION N/A 8/12/2019     Procedure: TRANSRECTAL ULTRASOUND;  Surgeon: Latia Yoon MD;  Location: [facility];  Service: Urology;  Laterality: N/A;    VASECTOMY        WISDOM TOOTH EXTRACTION                  Current Outpatient Medications   Medication Sig    ANDROGEL 20.25 mg/1.25 gram (1.62 %) GlPm      azelastine (ASTELIN) 137 mcg nasal spray 2 sprays (274 mcg total) by Nasal route 2 (two) times daily.    CRESTOR 10 mg tablet      diclofenac sodium (VOLTAREN) 1 % Gel      ezetimibe (ZETIA) 10 mg tablet      fluocinolone (DERMA-SMOOTHE) 0.01 % external oil Apply topically 3 (three) times daily. Apply to damp scalp at bedtime, wash off qam, avoid chronic use.    fluticasone propionate (FLONASE) 50 mcg/actuation nasal spray      losartan (COZAAR) 100 MG tablet      montelukast (SINGULAIR) 10 mg tablet TAKE 1 TABLET DAILY    omeprazole (PRILOSEC) 10 MG capsule Take 10 mg by mouth once daily.    ranitidine (ZANTAC) 150 MG tablet      tadalafil (CIALIS) 5 MG tablet Take 1 tablet (5 mg total) by mouth once daily.    triamcinolone acetonide 0.1% (KENALOG) 0.1 % cream aaa bid x 2 weeks then prn, avoid chronic use      No current facility-administered medications for this visit.          Review of patient's allergies indicates:   Allergen Reactions    Codeine Hives    Morphine Hives    Sulfa (sulfonamide antibiotics) Hives and Itching               Family History   Problem Relation Age of Onset    Dementia Mother      Cancer Father           skin    Heart disease Father      Allergic rhinitis Neg Hx       Allergies Neg Hx      Angioedema Neg Hx      Asthma Neg Hx      Atopy Neg Hx      Eczema Neg Hx      Immunodeficiency Neg Hx      Rhinitis Neg Hx      Urticaria Neg Hx           Social History               Socioeconomic History    Marital status:        Spouse name: Not on file    Number of children: Not on file    Years of education: Not on file    Highest education level: Not on file   Occupational History    Not on file   Social Needs    Financial resource strain: Not on file    Food insecurity:       Worry: Not on file       Inability: Not on file    Transportation needs:       Medical: Not on file       Non-medical: Not on file   Tobacco Use    Smoking status: Former Smoker       Packs/day: 1.00       Years: 10.00       Pack years: 10.00       Last attempt to quit: 4/3/1974       Years since quittin.8    Smokeless tobacco: Never Used   Substance and Sexual Activity    Alcohol use: No    Drug use: No    Sexual activity: Not on file   Lifestyle    Physical activity:       Days per week: Not on file       Minutes per session: Not on file    Stress: Not on file   Relationships    Social connections:       Talks on phone: Not on file       Gets together: Not on file       Attends Confucianism service: Not on file       Active member of club or organization: Not on file       Attends meetings of clubs or organizations: Not on file       Relationship status: Not on file   Other Topics Concern    Not on file   Social History Narrative    Not on file           Chief Complaint:        Chief Complaint   Patient presents with    Knee Pain       bilateral knee pain         History of present illness:  74-year-old male with a chronic bilateral knee arthritis.  He has tried cortisone injections previously.  He has had hyaluronic acid series in the past.  Patient has had a reaction with both Synvisc and Euflexxa.  We have been doing Orthovisc on him for a few years now with good success.   Knee pain has returned.  The last injections did not help as much as previously.  Left knee feels like it wants to give out at times. Has trouble going up and down steps now.        Review of Systems:     Musculoskeletal:  See HPI           Physical Examination:     Vital Signs:        Vitals:     01/27/20 0936   BP: 134/77   Pulse: 77         Body mass index is 35.3 kg/m².     This a well-developed, well nourished patient in no acute distress.  They are alert and oriented and cooperative to examination.  Pt. walks without an antalgic gait.       Examination of bilateral knees shows no rashes or erythema. There are no masses ecchymosis or effusion. Patient has full range of motion from 0-130°. Patient is nontender to palpation over lateral joint line and nontender to palpation over the medial joint line.Knee is stable to varus and valgus stress. 5 out of 5 motor strength. Palpable distal pulses. Intact light touch sensation. Negative Patellofemoral crepitus     Heart is regular rate without obvious murmurs   Normal respiratory effort without audible wheezing  Abdomen is soft and nontender         X-rays:  X-rays of both knees are ordered and reviewed which show severe medial joint space narrowing of both knees with the left slightly worse     Assessment::  Bilateral knee arthritis     Plan:  I had a long discussion with him and his wife about knee replacement surgery. We talked about the pros and cons of partial versus total knee replacement.  Patient would like to proceed with left total knee arthroplasty. Risks, benefits, and alternatives to the procedure were explained to the patient including but not limited to damage to nerves, arteries, blood vessels, bones, tendons, ligaments, stiffness, instability, infection, DVT, PE, as well as general anesthetic complications including seizure, stroke, heart attack and even death. The patient understood these risks and wished to proceed and signed the informed consent.          This note was created using [employer] voice recognition software that occasionally misinterpreted phrases or words.     Consult note is delivered via Epic messaging service.

## 2020-08-25 NOTE — ANESTHESIA PREPROCEDURE EVALUATION
08/25/2020  Manpreet Kerr is a 74 y.o., male.    Anesthesia Evaluation    I have reviewed the Patient Summary Reports.    I have reviewed the Nursing Notes. I have reviewed the NPO Status.   I have reviewed the Medications.     Review of Systems  Anesthesia Hx:  Denies Family Hx of Anesthesia complications.   Denies Personal Hx of Anesthesia complications.   Cardiovascular:   Hypertension ECG has been reviewed.    Musculoskeletal:   Arthritis         Physical Exam  General:  Obesity    Airway/Jaw/Neck:  Airway Findings: Mouth Opening: Normal Tongue: Normal  General Airway Assessment: Adult  Mallampati: III  Improves to II with phonation.  TM Distance: Normal, at least 6 cm  Jaw/Neck Findings:  Neck ROM: Extension Decreased, Mild  Neck Findings:  Girth Increased     Eyes/Ears/Nose:  EYES/EARS/NOSE FINDINGS: Normal    Chest/Lungs:  Chest/Lungs Findings: Normal Respiratory Rate     Heart/Vascular:  Heart Findings: Rate: Normal  Rhythm: Regular Rhythm        Mental Status:  Mental Status Findings: Normal        Anesthesia Plan  Type of Anesthesia, risks & benefits discussed:  Anesthesia Type:  spinal, general  Patient's Preference: SAB  Intra-op Monitoring Plan: standard ASA monitors  Intra-op Monitoring Plan Comments:   Post Op Pain Control Plan: multimodal analgesia and peripheral nerve block  Post Op Pain Control Plan Comments:   Induction:    Beta Blocker:  Patient is not currently on a Beta-Blocker (No further documentation required).       Informed Consent: Patient understands risks and agrees with Anesthesia plan.  Questions answered. Anesthesia consent signed with patient.  ASA Score: 2     Day of Surgery Review of History & Physical:    H&P update referred to the surgeon.         Ready For Surgery From Anesthesia Perspective.

## 2020-08-25 NOTE — H&P
[facility] Medicine  History & Physical    Patient Name: Manpreet Kerr  MRN: [id number]  Admission Date: 8/25/2020  Attending Physician: Arnel Isbell MD   Primary Care Provider: Primary Doctor No    Patient seen in postop recovery    Patient information was obtained from patient and Records available.     Subjective:     Principal Problem:Osteoarthritis of left knee, status post left knee arthroplasty today    Chief Complaint:  Left knee pain       HPI: This is a 74-year-old male who has a past medical history of BPH, hypertension, hyperlipidemia, GERD, and osteoarthritis of the left knee.  Patient is status post left knee arthroplasty done by Dr. Radford today.  Patient tolerated procedure well and is currently post procedure.             Past Medical History:   Diagnosis Date    Allergy     Arthritis     BPH with obstruction/lower urinary tract symptoms     Environmental allergies     H/O prostatitis     History of urinary retention     [place]hard of hearing[place]     WEARS BILATERAL HEARING AIDS    [place] hearing[place]     wears hearing aids    Hypertension     Sleep apnea     Urinary tract infection        Past Surgical History:   Procedure Laterality Date    HERNIA REPAIR      INGUNAL     TRANSRECTAL ULTRASOUND EXAMINATION N/A 8/12/2019    Procedure: TRANSRECTAL ULTRASOUND;  Surgeon: Latia Yoon MD;  [facility]: [facility];  Service: Urology;  Laterality: N/A;    VASECTOMY      WISDOM TOOTH EXTRACTION         Review of patient's allergies indicates:   Allergen Reactions    Codeine Hives    Morphine Hives    Sulfa (sulfonamide antibiotics) Hives and Itching       No current facility-administered medications on file prior to encounter.      Current Outpatient Medications on File Prior to Encounter   Medication Sig    acetaminophen (TYLENOL) 325 MG tablet Take 325 mg by mouth every 6 (six) hours as needed for Pain.    ANDROGEL 20.25 mg/1.25 gram (1.62 %)  GlPm     azelastine (ASTELIN) 137 mcg nasal spray 2 sprays (274 mcg total) by Nasal route 2 (two) times daily.    diclofenac sodium (VOLTAREN) 1 % Gel     diphenhydrAMINE (BENADRYL) 50 MG capsule Take 50 mg by mouth every 6 (six) hours as needed for Itching.    fluocinolone (DERMA-SMOOTHE) 0.01 % external oil Apply topically 3 (three) times daily. Apply to damp scalp at bedtime, wash off qam, avoid chronic use.    fluticasone propionate (FLONASE) 50 mcg/actuation nasal spray     losartan (COZAAR) 100 MG tablet     montelukast (SINGULAIR) 10 mg tablet TAKE 1 TABLET DAILY    omeprazole (PRILOSEC) 10 MG capsule Take 10 mg by mouth once daily.    aspirin (ECOTRIN) 81 MG EC tablet Take 81 mg by mouth once daily.    tadalafiL (CIALIS) 5 MG tablet Take 1 tablet (5 mg) by mouth daily as needed for Erectile Dysfunction.    triamcinolone acetonide 0.1% (KENALOG) 0.1 % cream aaa bid x 2 weeks then prn, avoid chronic use    [DISCONTINUED] CRESTOR 10 mg tablet Take 10 mg by mouth once daily.      Family History     Problem Relation (Age of Onset)    Cancer Father    Dementia Mother    Heart disease Father        Tobacco Use    Smoking status: Former Smoker     Packs/day: 1.00     Years: 10.00     Pack years: 10.00     Quit date: 4/3/1974     Years since quittin.4    Smokeless tobacco: Never Used   Substance and Sexual Activity    Alcohol use: No    Drug use: No    Sexual activity: Not on file     Review of Systems   Reason unable to perform ROS: Sleepy postop.   Constitutional: Negative for chills, fatigue and fever.   HENT: Positive for hearing loss. Negative for ear pain and facial swelling.    Eyes: Negative for pain and redness.   Respiratory: Negative for cough and shortness of breath.    Cardiovascular: Negative for chest pain, palpitations and leg swelling.   Gastrointestinal: Negative for abdominal distention, abdominal pain, blood in stool, constipation, diarrhea, nausea and vomiting.   Endocrine:  Negative for polydipsia and polyphagia.   Genitourinary: Negative for difficulty urinating, dysuria, flank pain and hematuria.   Musculoskeletal: Negative for neck pain and neck stiffness.   Skin: Positive for wound. Negative for color change.        Left knee dressing   Allergic/Immunologic: Negative for food allergies.   Neurological: Positive for numbness. Negative for facial asymmetry and speech difficulty.        Can't feel or move BLE   Hematological: Does not bruise/bleed easily.   Psychiatric/Behavioral: Negative for agitation, behavioral problems, confusion and suicidal ideas. The patient is not nervous/anxious.      Objective:     Vital Signs (Most Recent):  Temp: 97.3 °F (36.3 °C) (08/25/20 1232)  Pulse: 61 (08/25/20 1400)  Resp: 16 (08/25/20 1400)  BP: (!) 141/72 (08/25/20 1232)  SpO2: 96 % (08/25/20 1400) Vital Signs (24h Range):  Temp:  [97.3 °F (36.3 °C)-98.1 °F (36.7 °C)] 97.3 °F (36.3 °C)  Pulse:  [52-70] 61  Resp:  [10-20] 16  SpO2:  [91 %-99 %] 96 %  BP: (104-141)/(57-85) 141/72     Weight: 106.1 kg (234 lb)  Body mass index is 33.58 kg/m².    Physical Exam  Constitutional:       General: He is not in acute distress.     Appearance: Normal appearance. He is not ill-appearing.      Comments: Sleepy but arousable   HENT:      Head: Normocephalic and atraumatic.      Mouth/Throat:      Mouth: Mucous membranes are dry.   Eyes:      General:         Right eye: No discharge.         Left eye: No discharge.      Extraocular Movements: Extraocular movements intact.      Conjunctiva/sclera: Conjunctivae normal.      Pupils: Pupils are equal, round, and reactive to light.   Neck:      Musculoskeletal: Normal range of motion and neck supple.      Vascular: No carotid bruit.   Cardiovascular:      Rate and Rhythm: Normal rate and regular rhythm.      Heart sounds: Normal heart sounds. No murmur.   Pulmonary:      Effort: Pulmonary effort is normal.      Breath sounds: Normal breath sounds. No wheezing or  rhonchi.   Abdominal:      General: Bowel sounds are normal. There is no distension.      Tenderness: There is no abdominal tenderness. There is no guarding.   Genitourinary:     Comments: Not examined  Musculoskeletal:         General: No swelling.      Right lower leg: No edema.      Left lower leg: No edema.      Comments: LLE ROM not tested   Lymphadenopathy:      Cervical: No cervical adenopathy.   Skin:     General: Skin is warm and dry.      Capillary Refill: Capillary refill takes 2 to 3 seconds.      Comments: LLE dressing intact   Neurological:      General: No focal deficit present.   Psychiatric:         Mood and Affect: Mood normal.         Behavior: Behavior normal.         Thought Content: Thought content normal.         Judgment: Judgment normal.           CRANIAL NERVES     CN III, IV, VI   Pupils are equal, round, and reactive to light.            Results for orders placed or performed in visit on 08/22/20   COVID-19 Routine Screening   Result Value Ref Range    SARS-CoV2 (COVID-19) Qualitative PCR Not Detected Not Detected         Assessment/Plan:     * Osteoarthritis of left knee  Status post left knee arthroplasty 08/25/2020-  Orders as per surgeon- Dr. Prabhakar Stephenson pain medication  Fall precautions  Physical therapy and occupational therapy      Gastroesophageal reflux disease without esophagitis  Continue home PPI      Benign prostatic hyperplasia  Monitor for any signs of urinary retention  No home medication noted      Hypertension  Monitor  Resume home Arb at lower dose-titrate as needed      Allergic rhinitis  Continue home nasal spray      Hyperlipidemia  Patient reports currently not on home statin        VTE Risk Mitigation (From admission, onward)         Ordered     IP VTE LOW RISK PATIENT  Once      08/25/20 1347     Place sequential compression device  Until discontinued      08/25/20 1347                 Time spent seeing patient( greater than 1/2 spent in direct contact) : 48  ale Bro, TRAVISP  Department of Hospital Medicine   [facility]

## 2020-08-25 NOTE — OP NOTE
[facility]  Orthopedic Surgery  Operative Note    SUMMARY     Date of Procedure: 8/25/2020     Procedure: Procedure(s) (LRB):  ARTHROPLASTY, KNEE (Left)       Surgeon(s) and Role:     * Max Garcia MD - Primary    Assistant: Mame Melendez    Pre-Operative Diagnosis: Primary osteoarthritis of left knee [M17.12]    Post-Operative Diagnosis: Post-Op Diagnosis Codes:     * Primary osteoarthritis of left knee [M17.12]    Anesthesia: Spinal    Complications: No    Estimated Blood Loss (EBL): 50ml           Implants:   Implant Name Type Inv. Item Serial No.  Lot No. LRB No. Used Action   PIN PINABALL HEADLESS - [id number]  PIN PINABALL HEADLESS  CAESAR [employer]OM[employer]. [id number] Left 1 Implanted and Explanted   CEMENT BONE SURG SMPLX P RADPQ - [id number]  CEMENT BONE SURG SMPLX P RADPQ  [employer]OM[employer]. [id number] Left 1 Implanted   triathlon cruciate retaining femoral, #6 left     [id number] Left 1 Implanted   PATELLA TRIATHLON 33X9 SYMTRC - [id number]  PATELLA TRIATHLON 33X9 SYMTRC  [employer]OM[employer]. [id number] Left 1 Implanted   BASEPLATE TIB KING PRIM SZ 6 - [id number]  BASEPLATE TIB KING PRIM SZ 6  [employer]ES[employer]OM[employer]. [id number] Left 1 Implanted   tibial bearing insert, 9mm     [id number] Left 1 Implanted       Tourniquet time: 47min at 300mmHg    Specimens:   Specimen (12h ago, onward)    None                  Condition: Good    Disposition: PACU - hemodynamically stable.    Attestation: I was present and scrubbed for the entire procedure.    INDICATIONS FOR THE PROCEDURE: A 74 male with a history of chronic   Left knee arthritis, had failed all conservative measures including cortisone   injections, PT, viscosupplementation and activity modification. After a long   discussion, the patient wished to proceed with the procedure above.     PROCEDURE IN DETAIL: Risks, benefits and alternatives of the procedure were   explained to the patient including, but not limited to damage to nerves,    arteries or blood vessels. Also explained risk of infection, stiffness, DVT, PE,   polyethylene wear as well as anesthetic complications including seizure, stroke,   heart attack and death, understood this and signed informed consent. The   patient's Left knee was marked prior to coming to the Operating Room. The patient was brought to the operating room, placed on the operating table in a supine position.A  formal timeout was done in which correct patient, procedure and op site were all   correctly identified and confirmed by the entire operating team.  2gm Ancef was given prior to surgical incision. Spinal anesthesia was induced. The patient'sLeft  lower extremity was prepped and   draped in normal sterile fashion. TheLeft  leg was exsanguinated with an   Esmarch. Tourniquet was inflated up 300 mmHg. Standard anterior approach to   the knee was made. Medial parapatellar arthrotomy was made, leaving about 1/8   inch of tissue for later repair. Proximal medial tibial release was performed,   making sure not to release any of the MCL. We then   everted the patella and reflexed the knee. Fat pad was excised as well as some   of the ACL. Soft tissue off the distal anterior femur was removed for   visualization, so as to help prevent notching.  The intramedullary canal was then drilled and suctioned of fat.  The intramedullary guide was then inserted with 5° of valgus set.  It was then pinned into place.  I then made a distal femoral cut of 8 millimeters.  After   this was done, we turned our attention to the tibia. Ankle clamp was then inserted.  We then used the stylus to measure 2 millimeters off the most affected side. The ankle clamp was used to replicate the tibial slope and went perfectly centered down the tibial crest. A tibial cut   was then made. We then checked our extension gap, a 9-spacer was able to be placed.  We then turned our attention back to the femur.  We then sized the femur using 3° off the  posterior condylar axis. This was also parallel to the epicondylar axis.  It measured a size 6.  We then drilled our holes.  Four in 1 block for the size 6 block was then placed and the 4 in 1 cuts were made. We then   checked posteriorly and removed posterior femoral osteophytes.  We then used the gap  to make sure that a 9 spacer good fit both in flexion and extension. Varus and valgus balancing was then checked as well. We   decided to trial. Trial components were placed with a 9 meniscal bearing. The   patient had good balancing again in varus valgus in both flexion and extension.   We turned our attention to patella, caliper was used on the patella where it   measured 26. We set guide at 16 and made our 8-mm cut for polyethylene component, 33 was selected. Then drill holes were placed and the patellar button was placed.   This had good tracking. No need for a lateral release and with no hand tests,   it stayed centered the whole time. We then marked our tibial rotation. We then drilled our femoral lugs.  We then   removed everything except the size 6 tibial tray. We then checked our tibial tray   with a drop bernardo again and then marked our rotation and pinned it into place then   drilled, and then punched for our keel. We then started preparing final   components on the back table. Anterior, medial and lateral structures were injected with our local   Cocktail. Bony surfaces   copiously irrigated with Pulsavac and then thoroughly dried. Once the cement   was the appropriate consistency, first the tibia and then the femur were   cemented into place, removing excess cement. A 9 trial was placed. The knee   was held in compression and then the patella was placed. Cement was allowed to   cure. Once the cement was cured, we then removed excess cement. Again trialed   one final time, again seeing a 9. We then tapped into place the   final 9 meniscal bearing into place. After this was done, we then did our    diluted iodine soak for 3 minutes and then proceeded with closing.  Arthrotomy was closed using our StrataFix.   Subcutaneous tissue was closed using 2-0 Vicryl and skin was using a running 3-0   StrataFix and Dermabond.Instrument, sponge, and needle counts were correct prior to wound closure and at the conclusion of the case.  Sterile dressing was applied including a Cryo/Cuff.   They were extubated, awakened and transferred from the Operating Room to the   Recovery Room in stable condition.

## 2020-08-25 NOTE — ASSESSMENT & PLAN NOTE
Status post left knee arthroplasty 08/25/2020-  Orders as per surgeon- Dr. Prabhakar Stephenson pain medication  Fall precautions  Physical therapy and occupational therapy

## 2020-08-25 NOTE — PT/OT/SLP EVAL
"Physical Therapy Evaluation    Patient Name:  Manpreet Kerr   MRN:  [id number]    Recommendations:     Discharge Recommendations:  home health PT   Discharge Equipment Recommendations: none   Barriers to discharge: None    Assessment:     Manpreet Kerr is a 74 y.o. male admitted with a medical diagnosis of Osteoarthritis of left knee.  He presents with the following impairments/functional limitations:  weakness, impaired endurance, impaired sensation, impaired functional mobilty, impaired balance, gait instability, decreased lower extremity function, decreased ROM, orthopedic precautions. Patient is POD #0 L TKA. Patient is agreeable to participation with PT evaluation. He lives with his wife in a single story home. He does not use an AD at baseline. He was educated on L LE WBAT. He requires SBA for supine to sit transfer and ModAx2 for sit to stand transfer. He ambulated 20' with RW, Chandan, and step to gait pattern requiring verbal cueing for upright posture. He returned to bed with RN present to change dressing due to dressing being saturated with urine. He ordered a TTB and states it will be delivered to his room.     Rehab Prognosis: Good; patient would benefit from acute skilled PT services to address these deficits and reach maximum level of function.    Recent Surgery: Procedure(s) (LRB):  ARTHROPLASTY, KNEE (Left) Day of Surgery    Plan:     During this hospitalization, patient to be seen BID to address the identified rehab impairments via gait training, therapeutic activities, therapeutic exercises and progress toward the following goals:    · Plan of Care Expires:  09/25/20    Subjective     Chief Complaint: "I thought I would go home today"  Patient/Family Comments/goals: home  Pain/Comfort:  · Pain Rating 1: 0/10    Patients cultural, spiritual, Gnosticism conflicts given the current situation:      Living Environment:  Patient lives with spouse in [facility] with no ROSAS  Prior to admission, patients " level of function was independent. He does not use an AD at baseline. He drives, cooks, and cleans. He denies any previous falls or PT. Equipment used at home: none.  DME owned (not currently used): rolling walker, single point cane, bedside commode and lift chair.  Upon discharge, patient will have assistance from wife.    Objective:     Communicated with KENNEDY Rosa prior to session.  Patient found HOB elevated with cryotherapy, peripheral IV, SCD, telemetry  upon PT entry to room.    General Precautions: Standard, fall   Orthopedic Precautions:LLE weight bearing as tolerated   Braces: N/A     Exams:  · Cognitive Exam:  Patient is oriented to Person, Place, Time and Situation  · RUE ROM: WFL  · LUE ROM: WFL  · RLE Strength: WFL except no active DF yet  · LLE ROM: ~70 degrees sitting EOB    Functional Mobility:  · Bed Mobility:     · Supine to Sit: stand by assistance  · Transfers:     · Sit to Stand:  moderate assistance and of 2 persons with rolling walker  · Gait: 20' RW, Chandan, VC for upright and gait pattern  · Balance: Good    Therapeutic Activities and Exercises:   Patient was educated on the importance of OOB activity and functional mobility to negate negative effects of prolonged bed rest during hospitalization, safe transfers and ambulation, D/C planning, LLE WBAT    Patient performed 20 reps of L LE therapeutic exercise including quad sets, straight leg raises (assisted), short arc quads     AM-PAC 6 CLICK MOBILITY  Total Score:17     Patient left HOB elevated with all lines intact, call button in reach, bed alarm on, RN notified and wife present.    GOALS:   Multidisciplinary Problems     Physical Therapy Goals        Problem: Physical Therapy Goal    Goal Priority Disciplines Outcome Goal Variances Interventions   Physical Therapy Goal     PT, PT/OT      Description: Goals to be met by: 2020     Patient will increase functional independence with mobility by performin. Supine to sit with  Supervision  2. Sit to stand transfer with Supervision  3. Bed to chair transfer with Supervision using Rolling Walker  4. Gait  x 250 feet with Supervision using Rolling Walker.   5. Lower extremity exercise program x20 reps per handout, with independence                     History:     Past Medical History:   Diagnosis Date    Allergy     Arthritis     BPH with obstruction/lower urinary tract symptoms     Environmental allergies     H/O prostatitis     History of urinary retention     [place] (hard of hearing)     WEARS BILATERAL HEARING AIDS    [place] (hard of hearing)     wears hearing aids    Hypertension     Sleep apnea     Urinary tract infection        Past Surgical History:   Procedure Laterality Date    HERNIA REPAIR      INGUNAL     TRANSRECTAL ULTRASOUND EXAMINATION N/A 8/12/2019    Procedure: TRANSRECTAL ULTRASOUND;  Surgeon: Latia Yoon MD;  Location: [facility] OR;  Service: Urology;  Laterality: N/A;    VASECTOMY      WISDOM TOOTH EXTRACTION         Time Tracking:     PT Received On: 08/25/20  PT Start Time: 1555     PT Stop Time: 1637  PT Total Time (min): 42 min     Billable Minutes: Evaluation 10, Gait Training 16 and Therapeutic Exercise 16      Mone Fagan, PT  08/25/2020

## 2020-08-25 NOTE — RESPIRATORY THERAPY
08/25/20 1400   Patient Assessment/Suction   Level of Consciousness (AVPU) alert   Respiratory Effort Normal;Unlabored   PRE-TX-O2   O2 Device (Oxygen Therapy) room air   SpO2 96 %   Pulse Oximetry Type Intermittent   $ Pulse Oximetry - Multiple Charge Pulse Oximetry - Multiple   Pulse 61   Resp 16   Positioning HOB elevated 90 degrees   Incentive Spirometer   $ Incentive Spirometer Charges done with encouragement;postop instruction   Incentive Spirometer Predicted Level (mL) 2500   Administration (IS) instruction provided, initial;mouthpiece   Number of Repetitions (IS) 10   Level Incentive Spirometer (mL) 3000   Patient Tolerance (IS) good

## 2020-08-26 ENCOUNTER — TELEPHONE (OUTPATIENT)
Dept: ORTHOPEDICS | Facility: CLINIC | Age: 75
End: 2020-08-26

## 2020-08-26 LAB
ANION GAP SERPL CALC-SCNC: 8 MMOL/L (ref 8–16)
BASOPHILS # BLD AUTO: 0.02 K/UL (ref 0–0.2)
BASOPHILS NFR BLD: 0.2 % (ref 0–1.9)
BUN SERPL-MCNC: 12 MG/DL (ref 8–23)
CALCIUM SERPL-MCNC: 8.2 MG/DL (ref 8.7–10.5)
CHLORIDE SERPL-SCNC: 108 MMOL/L (ref 95–110)
CO2 SERPL-SCNC: 22 MMOL/L (ref 23–29)
CREAT SERPL-MCNC: 0.9 MG/DL (ref 0.5–1.4)
DIFFERENTIAL METHOD: ABNORMAL
EOSINOPHIL # BLD AUTO: 0 K/UL (ref 0–0.5)
EOSINOPHIL NFR BLD: 0 % (ref 0–8)
ERYTHROCYTE [DISTWIDTH] IN BLOOD BY AUTOMATED COUNT: 13.7 % (ref 11.5–14.5)
EST. GFR  (AFRICAN AMERICAN): >60 ML/MIN/1.73 M^2
EST. GFR  (NON AFRICAN AMERICAN): >60 ML/MIN/1.73 M^2
GLUCOSE SERPL-MCNC: 128 MG/DL (ref 70–110)
HCT VFR BLD AUTO: 42.9 % (ref 40–54)
HGB BLD-MCNC: 14 G/DL (ref 14–18)
IMM GRANULOCYTES # BLD AUTO: 0.02 K/UL (ref 0–0.04)
IMM GRANULOCYTES NFR BLD AUTO: 0.2 % (ref 0–0.5)
LYMPHOCYTES # BLD AUTO: 1.1 K/UL (ref 1–4.8)
LYMPHOCYTES NFR BLD: 12.9 % (ref 18–48)
MCH RBC QN AUTO: 31.2 PG (ref 27–31)
MCHC RBC AUTO-ENTMCNC: 32.6 G/DL (ref 32–36)
MCV RBC AUTO: 96 FL (ref 82–98)
MONOCYTES # BLD AUTO: 0.6 K/UL (ref 0.3–1)
MONOCYTES NFR BLD: 6.4 % (ref 4–15)
NEUTROPHILS # BLD AUTO: 6.9 K/UL (ref 1.8–7.7)
NEUTROPHILS NFR BLD: 80.3 % (ref 38–73)
NRBC BLD-RTO: 0 /100 WBC
PLATELET # BLD AUTO: 128 K/UL (ref 150–350)
PMV BLD AUTO: 10.9 FL (ref 9.2–12.9)
POTASSIUM SERPL-SCNC: 4 MMOL/L (ref 3.5–5.1)
RBC # BLD AUTO: 4.49 M/UL (ref 4.6–6.2)
SODIUM SERPL-SCNC: 138 MMOL/L (ref 136–145)
WBC # BLD AUTO: 8.56 K/UL (ref 3.9–12.7)

## 2020-08-26 PROCEDURE — 97535 SELF CARE MNGMENT TRAINING: CPT

## 2020-08-26 PROCEDURE — 85025 COMPLETE CBC W/AUTO DIFF WBC: CPT

## 2020-08-26 PROCEDURE — 25000003 PHARM REV CODE 250: Performed by: ANESTHESIOLOGY

## 2020-08-26 PROCEDURE — 25000003 PHARM REV CODE 250: Performed by: ORTHOPAEDIC SURGERY

## 2020-08-26 PROCEDURE — 97110 THERAPEUTIC EXERCISES: CPT

## 2020-08-26 PROCEDURE — 63600175 PHARM REV CODE 636 W HCPCS: Performed by: ORTHOPAEDIC SURGERY

## 2020-08-26 PROCEDURE — 94799 UNLISTED PULMONARY SVC/PX: CPT

## 2020-08-26 PROCEDURE — 94761 N-INVAS EAR/PLS OXIMETRY MLT: CPT

## 2020-08-26 PROCEDURE — 36415 COLL VENOUS BLD VENIPUNCTURE: CPT

## 2020-08-26 PROCEDURE — 25000003 PHARM REV CODE 250: Performed by: NURSE PRACTITIONER

## 2020-08-26 PROCEDURE — 80048 BASIC METABOLIC PNL TOTAL CA: CPT

## 2020-08-26 PROCEDURE — 97116 GAIT TRAINING THERAPY: CPT

## 2020-08-26 PROCEDURE — 97165 OT EVAL LOW COMPLEX 30 MIN: CPT

## 2020-08-26 RX ORDER — OXYCODONE AND ACETAMINOPHEN 5; 325 MG/1; MG/1
1 TABLET ORAL EVERY 6 HOURS PRN
Qty: 28 TABLET | Refills: 0 | Status: SHIPPED | OUTPATIENT
Start: 2020-08-26 | End: 2020-09-15 | Stop reason: ALTCHOICE

## 2020-08-26 RX ADMIN — METHOCARBAMOL TABLETS 500 MG: 500 TABLET, COATED ORAL at 09:08

## 2020-08-26 RX ADMIN — CEFAZOLIN SODIUM 2 G: 2 SOLUTION INTRAVENOUS at 12:08

## 2020-08-26 RX ADMIN — DOCUSATE SODIUM 100 MG: 100 CAPSULE, LIQUID FILLED ORAL at 09:08

## 2020-08-26 RX ADMIN — FAMOTIDINE 20 MG: 20 TABLET, FILM COATED ORAL at 09:08

## 2020-08-26 RX ADMIN — AZELASTINE HYDROCHLORIDE 274 MCG: 137 SPRAY, METERED NASAL at 09:08

## 2020-08-26 RX ADMIN — PREGABALIN 75 MG: 75 CAPSULE ORAL at 09:08

## 2020-08-26 RX ADMIN — LOSARTAN POTASSIUM 25 MG: 25 TABLET, FILM COATED ORAL at 09:08

## 2020-08-26 RX ADMIN — OXYCODONE HYDROCHLORIDE 10 MG: 10 TABLET, FILM COATED, EXTENDED RELEASE ORAL at 09:08

## 2020-08-26 RX ADMIN — ACETAMINOPHEN 1000 MG: 500 TABLET ORAL at 02:08

## 2020-08-26 RX ADMIN — ASPIRIN 81 MG 81 MG: 81 TABLET ORAL at 09:08

## 2020-08-26 RX ADMIN — MUPIROCIN 1 G: 20 OINTMENT TOPICAL at 09:08

## 2020-08-26 RX ADMIN — MONTELUKAST 10 MG: 10 TABLET, FILM COATED ORAL at 09:08

## 2020-08-26 NOTE — PLAN OF CARE
Patient AAO X 4. Patient free from injury during shift. Pain managed pharmacologically. Provided full relief. Patient free from N/V during shift. IV access has IV ABX running. . Remained afebrile during shift. Pt on room air tolerating well. Pt urinating w/o difficulty. Cryotherapy in place. Dressing C.D.I. SCDs and foot pump in place per order. Restroom offered. Repositioned as needed. Safety maintained with bed alarm. Bed in lowest position, call light and personal items within reach. Patient verbalized understanding of care. Will continue to monitor with every 2 hour rounding.

## 2020-08-26 NOTE — TELEPHONE ENCOUNTER
----- Message from Lucrecia Rodrigez MA sent at 8/26/2020  2:53 PM CDT -----  Contact: pt  Allergic to morphine, codeine and sulfa drugs   [facility] in [place]   Call back      Got oxy,

## 2020-08-26 NOTE — PLAN OF CARE
ACCOUNT WILL NOT LINK TO [facility].     CAMERON faxed patient information to [employer] [phone].  Fax confirmation received.       08/26/20 8643   Post-Acute Status   Post-Acute Authorization [facility]   Home Health Status Referrals Sent   Patient choice form signed by patient/caregiver List with quality metrics by geographic area provided

## 2020-08-26 NOTE — PLAN OF CARE
Cm completed the assessment with pt and wife at bedside.  Pt is independent in care and wife will assist pt with discharge needs.  PCP Dr Guzman at [facility].  Tg (wife) YAKOV.  Disposition:  Pt will discharge to home with spouse.  Pt signed the pt's choice disclosure form and selected [facility].             08/26/20 0942   Discharge Assessment   Assessment Type Discharge Planning Assessment   Confirmed/corrected address and phone number on facesheet? Yes   Assessment information obtained from? Patient   Expected Length of Stay (days) 2   Communicated expected length of stay with patient/caregiver yes   Prior to hospitilization cognitive status: Alert/Oriented   Prior to hospitalization functional status: Independent   Current cognitive status: Alert/Oriented   Current Functional Status: Independent   Facility Arrived From: home   Lives With spouse   Able to Return to Prior Arrangements yes   Is patient able to care for self after discharge? Yes   Who are your caregiver(s) and their phone number(s)? Tg- [phone]   Patient's perception of discharge disposition home health;home or selfcare   Readmission Within the Last 30 Days no previous admission in last 30 days   Patient currently being followed by outpatient case management? No   Patient currently receives any other outside agency services? No   Equipment Currently Used at Home walker, rolling;bedside commode;bath bench   Do you have any problems affording any of your prescribed medications? No   Is the patient taking medications as prescribed? yes   Does the patient have transportation home? Yes   Transportation Anticipated family or friend will provide   Dialysis Name and Scheduled days na   Does the patient receive services at the [facility]madin Clinic? No   Discharge Plan A Home with family;Home Health   Discharge Plan B Home with family;Home Health   DME Needed Upon Discharge  none   Patient/Family in Agreement with Plan yes

## 2020-08-26 NOTE — RESPIRATORY THERAPY
08/26/20 0731   PRE-TX-O2   O2 Device (Oxygen Therapy) room air   SpO2 98 %   Pulse Oximetry Type Intermittent   $ Pulse Oximetry - Multiple Charge Pulse Oximetry - Multiple   Incentive Spirometer   $ Incentive Spirometer Charges done with encouragement   Administration (IS) proper technique demonstrated   Number of Repetitions (IS) 10   Level Incentive Spirometer (mL) 2500   Patient Tolerance (IS) good

## 2020-08-26 NOTE — PT/OT/SLP EVAL
"Occupational Therapy   Evaluation and Discharge Note    Name: Manpreet Kerr  MRN: [id number]  Admitting Diagnosis:  Osteoarthritis of left knee 1 Day Post-Op    Recommendations:     Discharge Recommendations: home health PT  Discharge Equipment Recommendations:  none  Barriers to discharge:  None    Assessment:     Manpreet Kerr is a 74 y.o. male with a medical diagnosis of Osteoarthritis of left knee. At this time, patient is modified independent with ADLs. Patient does not require further acute OT services.     Plan:     During this hospitalization, patient does not require further acute OT services.  Please re-consult if situation changes.    · Plan of Care Reviewed with: patient    Subjective     Chief Complaint: none  Patient/Family Comments/goals: "I don't have any problems walking."    Occupational Profile:  Living Environment: Patient lives with spouse in a 1 story home.   Previous level of function: Patient was independent with ADLs and mobility.   Roles and Routines: Patient is very active - still drives and performs home mgmt tasks.   Equipment Used at home:  walker, rolling, cane, straight, bedside commode, other (see comments)(lift chair)  Assistance upon Discharge: Patient will receive assistance from spouse.     Pain/Comfort:  · Pain Rating 1: 0/10  · Location - Side 1: Left  · Location - Orientation 1: generalized  · Location 1: knee  · Pain Addressed 1: Reposition, Distraction  · Pain Rating Post-Intervention 1: 1/10    Patients cultural, spiritual, Confucianist conflicts given the current situation:      Objective:     Communicated with: nurse [place] prior to session.  Patient found up in chair with cryotherapy upon OT entry to room.    General Precautions: Standard, fall   Orthopedic Precautions:LLE weight bearing as tolerated   Braces: N/A     Occupational Performance:    Bed Mobility:    · Not assessed; patient seated in chair upon arrival. See PT notes.     Functional " Mobility/Transfers:  · Patient completed Sit <> Stand Transfer with stand by assistance  with  rolling walker   · Patient completed Toilet Transfer Stand Pivot technique with supervision with  rolling walker    Activities of Daily Living:  · Grooming: modified independence with oral, facial, and hand hygiene while standing at sink.  · Lower Body Dressing: modified independence to don/doff socks while seated in chair.  · Toileting: modified independence with voiding while seated on toilet.     Cognitive/Visual Perceptual:  Cognitive/Psychosocial Skills:     -       Oriented to: x4   -       Follows Commands/attention:Follows multistep  commands  -       Communication: clear/fluent  -       Safety awareness/insight to disability: intact   -       Mood/Affect/Coping skills/emotional control: Appropriate to situation and Cooperative  Visual/Perceptual:      -Intact     Physical Exam:  Postural examination/scapula alignment:    -       Rounded shoulders  -       Forward head  Upper Extremity Range of Motion:     -       Right Upper Extremity: WFL  -       Left Upper Extremity: WFL  Upper Extremity Strength:    -       Right Upper Extremity: WFL  -       Left Upper Extremity: WFL   Strength:    -       Right Upper Extremity: WFL  -       Left Upper Extremity: WFL  Fine Motor Coordination:    -       Intact  Gross motor coordination:   WFL    AMPAC 6 Click ADL:  AMPAC Total Score: 23    Education:    Patient left up in chair with all lines intact, call button in reach and spouse present    GOALS:   Multidisciplinary Problems     Occupational Therapy Goals     Not on file                History:     Past Medical History:   Diagnosis Date    Allergy     Arthritis     BPH with obstruction/lower urinary tract symptoms     Environmental allergies     H/O prostatitis     History of urinary retention     [place] (hard of hearing)     WEARS BILATERAL HEARING AIDS    [place] (hard of hearing)     wears hearing aids     Hypertension     Sleep apnea     Urinary tract infection        Past Surgical History:   Procedure Laterality Date    HERNIA REPAIR      INGUNAL     KNEE ARTHROPLASTY Left 8/25/2020    Procedure: ARTHROPLASTY, KNEE;  Surgeon: Max Garcia MD;  Location: [facility] OR;  Service: Orthopedics;  Laterality: Left;    TRANSRECTAL ULTRASOUND EXAMINATION N/A 8/12/2019    Procedure: TRANSRECTAL ULTRASOUND;  Surgeon: Latia Yoon MD;  [facility];  Service: Urology;  Laterality: N/A;    VASECTOMY      WISDOM TOOTH EXTRACTION         Time Tracking:     OT Date of Treatment: 08/26/20  OT Start Time: 1024  OT Stop Time: 1040  OT Total Time (min): 16 min    Billable Minutes:Evaluation 8  Self Care/Home Management 8    Ad Acosta, OT  8/26/2020

## 2020-08-26 NOTE — DISCHARGE SUMMARY
[facility] Hospital Medicine  Discharge Summary      Patient Name: Manpreet Kerr  MRN: [id number]  Admission Date: 8/25/2020  Hospital Length of Stay: 0 days  Discharge Date and Time:  08/26/2020 2:46 PM  Attending Physician: Alem att. providers found   Discharging Provider: Messi Zamarripa NP  Primary Care Provider: Primary Doctor Alem      HPI:   This is a 74-year-old male who has a past medical history of BPH, hypertension, hyperlipidemia, GERD, and osteoarthritis of the left knee.  Patient is status post left knee arthroplasty done by Dr. Radford today.  Patient tolerated procedure well and is currently post procedure.             Procedure(s) (LRB):  ARTHROPLASTY, KNEE (Left)      Hospital Course:   Manpreet Kerr underwent left knee arthroplasty per Dr. Radford yesterday.  He tolerated procedure well, without post-operative complications. His pain was well controlled and he effectively worked with therapy and ambulated. He was discharged home today with orders for therapy with home health.    PE:  Gen: Pleasant, well developed, gentleman in NAD.  Resp: Even and unlabored. Breath sounds clear.  Card: HRRR  Abd: Soft, non-tender, non-distended. Bowel sounds active x 4.     Consults:   Consults (From admission, onward)        Status Ordering Provider     Inpatient consult to Hospitalist  Once     Provider:  EMMETT Crespo    Acknowledged YUMIKO UGALDE          No new Assessment & Plan notes have been filed under this hospital service since the last note was generated.  Service: Hospital Medicine    Final Active Diagnoses:    Diagnosis Date Noted POA    PRINCIPAL PROBLEM:  Osteoarthritis of left knee [M17.12] 05/06/2020 Yes    Gastroesophageal reflux disease without esophagitis [K21.9] 08/25/2020 Yes    Benign prostatic hyperplasia [N40.0] 10/15/2019 Yes    Allergic rhinitis [J30.9] 07/07/2014 Yes    Hypertension [I10] 07/07/2014 Yes    Hyperlipidemia [E78.5]  "05/18/2014 Yes      Problems Resolved During this Admission:       Discharged Condition: good    Disposition: Home-Health Care [facility] [place]    Follow Up:  Follow-up Information     Max Garcia MD On 9/14/2020.    Specialties: Sports Medicine, Orthopedic Surgery  Why: Post-op f/u in avs  Contact information:  [address] [place] [address] DR Carrillo Pema F[place]ER [address]  [phone]             Primary Doctor No In 2 weeks.    Why: Physician at [facility].    Why: Home Health  Contact information:  [phone]               Patient Instructions:      TRANSFER TUB BENCH FOR HOME USE     Order Specific Question Answer Comments   Type of Transfer Tub Bench: Unpadded    Height: 5' 10" (1.778 m)    Weight: 106.1 kg (234 lb)    Does patient have medical equipment at home? walker, rolling lift chair / lift chair / lift chair / lift chair   Does patient have medical equipment at home? cane, straight    Does patient have medical equipment at home? bedside commode    Does patient have medical equipment at home? other (see comments)    Length of need (1-99 months): 3      Diet Adult Regular     Ice to affected area     Keep surgical extremity elevated     Notify your health care provider if you experience any of the following:  temperature >100.4     Notify your health care provider if you experience any of the following:  persistent nausea and vomiting or diarrhea     Notify your health care provider if you experience any of the following:  severe uncontrolled pain     Notify your health care provider if you experience any of the following:  redness, tenderness, or signs of infection (pain, swelling, redness, odor or green/yellow discharge around incision site)     Notify your health care provider if you experience any of the following:  difficulty breathing or increased cough     Notify your health care provider if you experience any of the following:  persistent dizziness, light-headedness, or visual disturbances "     Notify your health care provider if you experience any of the following:  increased confusion or weakness     Leave dressing on - Keep it clean, dry, and intact until clinic visit     SUBSEQUENT HOME HEALTH ORDERS   Order Comments: Subsequent Home Health Orders    Current Medications:  Current Facility-Administered Medications:  acetaminophen tablet 1,000 mg, 1,000 mg, Oral, Q6H, Alvin Benoit MD, 1,000 mg at 08/26/20 0228  aspirin chewable tablet 81 mg, 81 mg, Oral, BID, Max Garcia MD, 81 mg at 08/26/20 0916  azelastine 137 mcg (0.1 %) nasal spray 274 mcg, 2 spray, Nasal, BID, EMMETT Crespo, 274 mcg at 08/26/20 0919  [START ON 8/27/2020] celecoxib capsule 100 mg, 100 mg, Oral, Q12H, Alvin Benoit MD  dextrose 5 % and 0.9 % NaCl infusion, , Intravenous, Continuous, Max Garcia MD, Last Rate: 100 mL/hr at 08/25/20 2224  diphenhydrAMINE injection 12.5 mg, 12.5 mg, Intravenous, PRN, Félix Pak MD  docusate sodium capsule 100 mg, 100 mg, Oral, Q12H, Max Garcia MD, 100 mg at 08/26/20 0916  famotidine tablet 20 mg, 20 mg, Oral, BID, Max Garcia MD, 20 mg at 08/26/20 0914  loperamide capsule 2 mg, 2 mg, Oral, Continuous PRN, Max Garcia MD  losartan tablet 25 mg, 25 mg, Oral, Daily, EMMETT Crespo, 25 mg at 08/26/20 0916  methocarbamoL tablet 500 mg, 500 mg, Oral, TID, Félix Pak MD, 500 mg at 08/26/20 0913  montelukast tablet 10 mg, 10 mg, Oral, Daily, EMMETT Crespo, 10 mg at 08/26/20 0914  morphine injection 2 mg, 2 mg, Intravenous, Q1H PRN, Alvin Benoit MD  mupirocin 2 % ointment 1 g, 1 g, Nasal, BID, Max Garcia MD, 1 g at 08/26/20 0919  ondansetron injection 4 mg, 4 mg, Intravenous, Q12H PRN, Alvin Benoit MD  oxyCODONE 12 hr tablet 10 mg, 10 mg, Oral, Q12H, Alvin Benoit MD, 10 mg at 08/26/20 0917  oxyCODONE immediate release tablet 15 mg, 15 mg, Oral, Q3H PRN, Alvin  JAYCOB Benoit MD  oxyCODONE immediate release tablet 15 mg, 15 mg, Oral, Q3H PRN, Félix Pak MD  oxyCODONE immediate release tablet 5 mg, 5 mg, Oral, Q3H PRN, Alvin Benoit MD  oxyCODONE immediate release tablet 5 mg, 5 mg, Oral, Q3H PRN, Félix Pak MD  oxyCODONE immediate release tablet Tab 10 mg, 10 mg, Oral, Q3H PRN, Alvin Benoit MD  oxyCODONE immediate release tablet Tab 10 mg, 10 mg, Oral, Q3H PRN, Félix Pak MD  pregabalin capsule 75 mg, 75 mg, Oral, Q12H, Alvin Benoit MD, 75 mg at 08/26/20 0917  promethazine (PHENERGAN) 6.25 mg in dextrose 5 % 50 mL IVPB, 6.25 mg, Intravenous, Q6H PRN, Alvin Benoit MD  sodium chloride 0.9% flush 10 mL, 10 mL, Intravenous, PRN, Max Garcia MD  zolpidem tablet 5 mg, 5 mg, Oral, Nightly PRN, Félix Pak MD        Nursing:   N/A    Diet:   regular diet    Activities:   activity as tolerated    Labs:  NONE    Referrals/ Consults  Physical Therapy to evaluate and treat. Evaluate for home safety and equipment needs; Establish/upgrade home exercise program. Perform / instruct on therapeutic exercises, gait training, transfer training, and Range of Motion.  Occupational Therapy to evaluate and treat. Evaluate home environment for safety and equipment needs. Perform/Instruct on transfers, ADL training, ROM, and therapeutic exercises.    Home Health Aide:  Nursing Three times weekly, Physical Therapy Three times weekly and Occupational Therapy Three times weekly     Order Specific Question Answer Comments   What Home Health Agency is the patient currently using? Other/External      Activity as tolerated       Significant Diagnostic Studies: Labs:   BMP:   Recent Labs   Lab 08/26/20  0538   *      K 4.0      CO2 22*   BUN 12   CREATININE 0.9   CALCIUM 8.2*   , CBC   Recent Labs   Lab 08/26/20  0538   WBC 8.56   HGB 14.0   HCT 42.9   *    and INR No results found for: INR, PROTIME    Pending  Diagnostic Studies:     None         Medications:  Reconciled Home Medications:      Medication List      START taking these medications    oxyCODONE-acetaminophen 5-325 mg per tablet  Commonly known as: PERCOCET  Take 1 tablet by mouth every 6 (six) hours as needed.        CONTINUE taking these medications    acetaminophen 325 MG tablet  Commonly known as: TYLENOL  Take 325 mg by mouth every 6 (six) hours as needed for Pain.     AndroGeL 20.25 mg/1.25 gram (1.62 %) Glpm  Generic drug: testosterone     aspirin 81 MG EC tablet  Commonly known as: ECOTRIN  Take 81 mg by mouth once daily.     azelastine 137 mcg (0.1 %) nasal spray  Commonly known as: ASTELIN  2 sprays (274 mcg total) by Nasal route 2 (two) times daily.     diclofenac sodium 1 % Gel  Commonly known as: VOLTAREN     diphenhydrAMINE 50 MG capsule  Commonly known as: BENADRYL  Take 50 mg by mouth every 6 (six) hours as needed for Itching.     fluocinolone 0.01 % external oil  Commonly known as: DERMA-SMOOTHE  Apply topically 3 (three) times daily. Apply to damp scalp at bedtime, wash off qam, avoid chronic use.     fluticasone propionate 50 mcg/actuation nasal spray  Commonly known as: FLONASE     losartan 100 MG tablet  Commonly known as: COZAAR     montelukast 10 mg tablet  Commonly known as: SINGULAIR  TAKE 1 TABLET DAILY     omeprazole 10 MG capsule  Commonly known as: PRILOSEC  Take 10 mg by mouth once daily.     tadalafiL 5 MG tablet  Commonly known as: CIALIS  Take 1 tablet (5 mg) by mouth daily as needed for Erectile Dysfunction.     triamcinolone acetonide 0.1% 0.1 % cream  Commonly known as: KENALOG  aaa bid x 2 weeks then prn, avoid chronic use        STOP taking these medications    CRESTOR 10 MG tablet  Generic drug: rosuvastatin            Indwelling Lines/Drains at time of discharge:   Lines/Drains/Airways     None                 Time spent on the discharge of patient: 35 minutes  Patient was seen and examined on the date of discharge and  determined to be suitable for discharge.         Messi Zamarripa NP  Department of Hospital Medicine  [facility]

## 2020-08-26 NOTE — PLAN OF CARE
Per Tyson with [facility], patient accepted.       08/26/20 110   Post-Acute Status   Post-Acute Authorization Home Health   Home Health Status Set-up Complete

## 2020-08-26 NOTE — PLAN OF CARE
Pt is cleared form  for D/C.       08/26/20 1210   Final Note   Assessment Type Final Discharge Note   Anticipated Discharge Disposition Home   Hospital Follow Up  Appt(s) scheduled? Yes

## 2020-08-26 NOTE — HOSPITAL COURSE
Manpreet Kerr underwent left knee arthroplasty per Dr. Radford yesterday.  He tolerated procedure well, without post-operative complications. His pain was well controlled and he effectively worked with therapy and ambulated. He was discharged home today with orders for therapy with home health.    PE:  Gen: Pleasant, well developed, gentleman in NAD.  Resp: Even and unlabored. Breath sounds clear.  Card: HRRR  Abd: Soft, non-tender, non-distended. Bowel sounds active x 4.  Musc: Left knee post-op drsg, CDI. Polar ice in place.

## 2020-08-26 NOTE — NURSING
Discharge instructions given to pt. Instructed on medicine regimen and f/u appts. Pt verbalizes understanding. IV  removed. Awaiting transport. Zane bowles

## 2020-08-26 NOTE — DISCHARGE SUMMARY
[facility] Hospital Medicine  Discharge Summary      Patient Name: Manpreet Kerr  MRN: [id number]  Admission Date: 8/25/2020  Hospital Length of Stay: 0 days  Discharge Date and Time:  08/26/2020 2:48 PM  Attending Physician: Alem att. providers found   Discharging Provider: Messi Zamarripa NP  Primary Care Provider: Primary Doctor Alem      HPI:   This is a 74-year-old male who has a past medical history of BPH, hypertension, hyperlipidemia, GERD, and osteoarthritis of the left knee.  Patient is status post left knee arthroplasty done by Dr. Radford today.  Patient tolerated procedure well and is currently post procedure.             Procedure(s) (LRB):  ARTHROPLASTY, KNEE (Left)      Hospital Course:   Manpreet Kerr underwent left knee arthroplasty per Dr. Radford yesterday.  He tolerated procedure well, without post-operative complications. His pain was well controlled and he effectively worked with therapy and ambulated. He was discharged home today with orders for therapy with home health.    PE:  Gen: Pleasant, well developed, gentleman in NAD.  Resp: Even and unlabored. Breath sounds clear.  Card: HRRR  Abd: Soft, non-tender, non-distended. Bowel sounds active x 4.  Musc: Left knee post-op drsg, CDI. Polar ice in place.     Consults:   Consults (From admission, onward)        Status Ordering Provider     Inpatient consult to Hospitalist  Once     Provider:  EMMETT Crespo    Acknowledged YUMIKO UGALDE          No new Assessment & Plan notes have been filed under this hospital service since the last note was generated.  Service: Hospital Medicine    Final Active Diagnoses:    Diagnosis Date Noted POA    PRINCIPAL PROBLEM:  Osteoarthritis of left knee [M17.12] 05/06/2020 Yes    Gastroesophageal reflux disease without esophagitis [K21.9] 08/25/2020 Yes    Benign prostatic hyperplasia [N40.0] 10/15/2019 Yes    Allergic rhinitis [J30.9] 07/07/2014 Yes    Hypertension  "[I10] 07/07/2014 Yes    Hyperlipidemia [E78.5] 05/18/2014 Yes      Problems Resolved During this Admission:       Discharged Condition: good    Disposition: Home-Health Care [facility]    Follow Up:  Follow-up Information     Max Garcia MD On 9/14/2020.    Specialties: Sports Medicine, Orthopedic Surgery  Why: Post-op f/u in avs  Contact information:  [address] [place] [address] DR Carrillo Pema  [facility] [address]  [phone]             Primary Doctor No In 2 weeks.    Why: Physician at [facility].    Why: Home Health  Contact information:  [phone]               Patient Instructions:      TRANSFER TUB BENCH FOR HOME USE     Order Specific Question Answer Comments   Type of Transfer Tub Bench: Unpadded    Height: 5' 10" (1.778 m)    Weight: 106.1 kg (234 lb)    Does patient have medical equipment at home? walker, rolling lift chair / lift chair / lift chair / lift chair   Does patient have medical equipment at home? cane, straight    Does patient have medical equipment at home? bedside commode    Does patient have medical equipment at home? other (see comments)    Length of need (1-99 months): 3      Diet Adult Regular     Ice to affected area     Keep surgical extremity elevated     Notify your health care provider if you experience any of the following:  temperature >100.4     Notify your health care provider if you experience any of the following:  persistent nausea and vomiting or diarrhea     Notify your health care provider if you experience any of the following:  severe uncontrolled pain     Notify your health care provider if you experience any of the following:  redness, tenderness, or signs of infection (pain, swelling, redness, odor or green/yellow discharge around incision site)     Notify your health care provider if you experience any of the following:  difficulty breathing or increased cough     Notify your health care provider if you experience any of the following:  persistent " dizziness, light-headedness, or visual disturbances     Notify your health care provider if you experience any of the following:  increased confusion or weakness     Leave dressing on - Keep it clean, dry, and intact until clinic visit     SUBSEQUENT HOME HEALTH ORDERS   Order Comments: Subsequent Home Health Orders    Current Medications:  Current Facility-Administered Medications:  acetaminophen tablet 1,000 mg, 1,000 mg, Oral, Q6H, Alvin Benoit MD, 1,000 mg at 08/26/20 0228  aspirin chewable tablet 81 mg, 81 mg, Oral, BID, Max Garcia MD, 81 mg at 08/26/20 0916  azelastine 137 mcg (0.1 %) nasal spray 274 mcg, 2 spray, Nasal, BID, EMMETT Crespo, 274 mcg at 08/26/20 0919  [START ON 8/27/2020] celecoxib capsule 100 mg, 100 mg, Oral, Q12H, Alvin Benoit MD  dextrose 5 % and 0.9 % NaCl infusion, , Intravenous, Continuous, Max Garcia MD, Last Rate: 100 mL/hr at 08/25/20 2224  diphenhydrAMINE injection 12.5 mg, 12.5 mg, Intravenous, PRN, Félix Pak MD  docusate sodium capsule 100 mg, 100 mg, Oral, Q12H, Max Garcia MD, 100 mg at 08/26/20 0916  famotidine tablet 20 mg, 20 mg, Oral, BID, Max Garcia MD, 20 mg at 08/26/20 0914  loperamide capsule 2 mg, 2 mg, Oral, Continuous PRN, Max Garcia MD  losartan tablet 25 mg, 25 mg, Oral, Daily, EMMETT Crespo, 25 mg at 08/26/20 0916  methocarbamoL tablet 500 mg, 500 mg, Oral, TID, Félix Pak MD, 500 mg at 08/26/20 0913  montelukast tablet 10 mg, 10 mg, Oral, Daily, EMMETT Crespo, 10 mg at 08/26/20 0914  morphine injection 2 mg, 2 mg, Intravenous, Q1H PRN, Alvin Benoit MD  mupirocin 2 % ointment 1 g, 1 g, Nasal, BID, Max Garcia MD, 1 g at 08/26/20 0919  ondansetron injection 4 mg, 4 mg, Intravenous, Q12H PRN, Alvin Benoit MD  oxyCODONE 12 hr tablet 10 mg, 10 mg, Oral, Q12H, Alvin Benoit MD, 10 mg at 08/26/20 0917  oxyCODONE immediate  release tablet 15 mg, 15 mg, Oral, Q3H PRN, Alvin Benoit MD  oxyCODONE immediate release tablet 15 mg, 15 mg, Oral, Q3H PRN, Félix Pak MD  oxyCODONE immediate release tablet 5 mg, 5 mg, Oral, Q3H PRN, Alvin Benoit MD  oxyCODONE immediate release tablet 5 mg, 5 mg, Oral, Q3H PRN, Félix Pak MD  oxyCODONE immediate release tablet Tab 10 mg, 10 mg, Oral, Q3H PRN, Alvin Benoit MD  oxyCODONE immediate release tablet Tab 10 mg, 10 mg, Oral, Q3H PRN, Félix Pak MD  pregabalin capsule 75 mg, 75 mg, Oral, Q12H, Alvin Benoit MD, 75 mg at 08/26/20 0917  promethazine (PHENERGAN) 6.25 mg in dextrose 5 % 50 mL IVPB, 6.25 mg, Intravenous, Q6H PRN, Alvin Benoit MD  sodium chloride 0.9% flush 10 mL, 10 mL, Intravenous, PRN, Max Garcia MD  zolpidem tablet 5 mg, 5 mg, Oral, Nightly PRN, Félix Pak MD        Nursing:   N/A    Diet:   regular diet    Activities:   activity as tolerated    Labs:  NONE    Referrals/ Consults  Physical Therapy to evaluate and treat. Evaluate for home safety and equipment needs; Establish/upgrade home exercise program. Perform / instruct on therapeutic exercises, gait training, transfer training, and Range of Motion.  Occupational Therapy to evaluate and treat. Evaluate home environment for safety and equipment needs. Perform/Instruct on transfers, ADL training, ROM, and therapeutic exercises.    Home Health Aide:  Nursing Three times weekly, Physical Therapy Three times weekly and Occupational Therapy Three times weekly     Order Specific Question Answer Comments   What Home Health Agency is the patient currently using? Other/External      Activity as tolerated       Significant Diagnostic Studies: Labs:   BMP:   Recent Labs   Lab 08/26/20  0538   *      K 4.0      CO2 22*   BUN 12   CREATININE 0.9   CALCIUM 8.2*   , CBC   Recent Labs   Lab 08/26/20  0538   WBC 8.56   HGB 14.0   HCT 42.9   *     and INR No results found for: INR, PROTIME    Pending Diagnostic Studies:     None         Medications:  Reconciled Home Medications:      Medication List      START taking these medications    oxyCODONE-acetaminophen 5-325 mg per tablet  Commonly known as: PERCOCET  Take 1 tablet by mouth every 6 (six) hours as needed.        CONTINUE taking these medications    acetaminophen 325 MG tablet  Commonly known as: TYLENOL  Take 325 mg by mouth every 6 (six) hours as needed for Pain.     AndroGeL 20.25 mg/1.25 gram (1.62 %) Glpm  Generic drug: testosterone     aspirin 81 MG EC tablet  Commonly known as: ECOTRIN  Take 81 mg by mouth once daily.     azelastine 137 mcg (0.1 %) nasal spray  Commonly known as: ASTELIN  2 sprays (274 mcg total) by Nasal route 2 (two) times daily.     diclofenac sodium 1 % Gel  Commonly known as: VOLTAREN     diphenhydrAMINE 50 MG capsule  Commonly known as: BENADRYL  Take 50 mg by mouth every 6 (six) hours as needed for Itching.     fluocinolone 0.01 % external oil  Commonly known as: DERMA-SMOOTHE  Apply topically 3 (three) times daily. Apply to damp scalp at bedtime, wash off qam, avoid chronic use.     fluticasone propionate 50 mcg/actuation nasal spray  Commonly known as: FLONASE     losartan 100 MG tablet  Commonly known as: COZAAR     montelukast 10 mg tablet  Commonly known as: SINGULAIR  TAKE 1 TABLET DAILY     omeprazole 10 MG capsule  Commonly known as: PRILOSEC  Take 10 mg by mouth once daily.     tadalafiL 5 MG tablet  Commonly known as: CIALIS  Take 1 tablet (5 mg) by mouth daily as needed for Erectile Dysfunction.     triamcinolone acetonide 0.1% 0.1 % cream  Commonly known as: KENALOG  aaa bid x 2 weeks then prn, avoid chronic use        STOP taking these medications    CRESTOR 10 MG tablet  Generic drug: rosuvastatin            Indwelling Lines/Drains at time of discharge:   Lines/Drains/Airways     None                 Time spent on the discharge of patient: 35  minutes  Patient was seen and examined on the date of discharge and determined to be suitable for discharge.         Messi Zamarripa NP  Department of Hospital Medicine  [facility]

## 2020-08-26 NOTE — CARE UPDATE
08/25/20 2015   Patient Assessment/Suction   Level of Consciousness (AVPU) alert   PRE-TX-O2   O2 Device (Oxygen Therapy) room air   SpO2 95 %   Pulse Oximetry Type Intermittent   $ Pulse Oximetry - Multiple Charge Pulse Oximetry - Multiple   Pulse 60   Resp 18   Incentive Spirometer   $ Incentive Spirometer Charges done with encouragement   Administration (IS) proper technique demonstrated   Number of Repetitions (IS) 10   Level Incentive Spirometer (mL) 3000   Patient Tolerance (IS) good

## 2020-08-26 NOTE — PT/OT/SLP PROGRESS
Physical Therapy Treatment    Patient Name:  Manpreet Kerr   MRN:  [id number]    Recommendations:     Discharge Recommendations:  home health PT   Discharge Equipment Recommendations: none   Barriers to discharge: None    Assessment:     Manpreet Kerr is a 74 y.o. male admitted with a medical diagnosis of Osteoarthritis of left knee.  He presents with the following impairments/functional limitations:  weakness, impaired endurance, impaired functional mobilty, gait instability, impaired balance, decreased lower extremity function, pain, edema, orthopedic precautions .  Patient readily agreeable to PT treatment this morning and said he was ready to go home today.  Patient presented supine in bed and was able to transfer out of bed with SBA and then ambulated x 500 feet with RW with SBA.    Rehab Prognosis: Good; patient would benefit from acute skilled PT services to address these deficits and reach maximum level of function.    Recent Surgery: Procedure(s) (LRB):  ARTHROPLASTY, KNEE (Left) 1 Day Post-Op    Plan:     During this hospitalization, patient to be seen BID to address the identified rehab impairments via gait training, therapeutic activities, therapeutic exercises and progress toward the following goals:    · Plan of Care Expires:  09/25/20    Subjective     Chief Complaint: pain at end range left knee flexion  Patient/Family Comments/goals: go home  Pain/Comfort:  · Pain Rating 1: 0/10  · Location - Side 1: Left  · Location - Orientation 1: lower  · Location 1: knee  · Pain Addressed 1: Reposition, Cessation of Activity  · Pain Rating Post-Intervention 1: 4/10(pain increased at end range flexion)      Objective:     Communicated with nurse prior to session.  Patient found supine with bed alarm, cryotherapy upon PT entry to room.     General Precautions: Standard, fall   Orthopedic Precautions:LLE weight bearing as tolerated   Braces:       Functional Mobility:  · Bed Mobility:     · Supine to  Sit: stand by assistance  · Transfers:     · Sit to Stand:  stand by assistance with rolling walker  · Gait: x 500 feet with RW with SBA      AM-PAC 6 CLICK MOBILITY          Therapeutic Activities and Exercises:   exercise to include quad sets, SLR, ankle pumps, hip abd and heel slides.  All done left LE x 12 reps with 3 second hold on isometrics.  Gait training x 500 feet with RW with SBA using good step thru gait pattern.    Patient left up in chair with call button in reach and nurse notified..    GOALS:   Multidisciplinary Problems     Physical Therapy Goals        Problem: Physical Therapy Goal    Goal Priority Disciplines Outcome Goal Variances Interventions   Physical Therapy Goal     PT, PT/OT      Description: Goals to be met by: 2020     Patient will increase functional independence with mobility by performin. Supine to sit with Supervision  2. Sit to stand transfer with Supervision  3. Bed to chair transfer with Supervision using Rolling Walker  4. Gait  x 250 feet with Supervision using Rolling Walker.   5. Lower extremity exercise program x20 reps per handout, with independence                     Time Tracking:     PT Received On: 20  PT Start Time: 731     PT Stop Time: 802  PT Total Time (min): 31 min     Billable Minutes: Gait Training 16 and Therapeutic Exercise 15    Treatment Type: Treatment  PT/PTA: PT     PTA Visit Number: 0     Chris Megilligan, PT  2020

## 2020-08-26 NOTE — NURSING
Called to patient room by pt. Pt states he feels like his stomach is tight and does not have the sensation of voiding. Pt stomach hard upon assessment. Bladder scan done and showed over 1000ml. In and out done, 850 out. Pt states he has had problems in the past with retention and sees Dr. Yoon. Lisa hathaway. Will continue to monitor.

## 2020-08-26 NOTE — TELEPHONE ENCOUNTER
Spoke to patient. Advised per Dr Garcia, he should be ok to take the Percocet are we can change it to Norco. Pt verbalized understanding. States he has not had any problems. Will continue with the Percocet. Advised to give us a call if any problems arose.

## 2020-08-27 VITALS
HEIGHT: 70 IN | BODY MASS INDEX: 33.5 KG/M2 | SYSTOLIC BLOOD PRESSURE: 131 MMHG | DIASTOLIC BLOOD PRESSURE: 76 MMHG | HEART RATE: 62 BPM | RESPIRATION RATE: 16 BRPM | WEIGHT: 234 LBS | TEMPERATURE: 98 F | OXYGEN SATURATION: 95 %

## 2020-08-27 NOTE — PLAN OF CARE
"Did f/u call for post op phone calls pt has been trying to reach dr Yoon by [facility]  today ;she is oot  Pt having overactive bladder symptoms per his opinion" since his spinal from his knee surgery I called back line of urology office short day no none available to call pt so I called dr Siegel office urology on call and msgs left for him to call and discuss pt symptoms with him   "

## 2020-08-28 ENCOUNTER — OFFICE VISIT (OUTPATIENT)
Dept: UROLOGY | Facility: CLINIC | Age: 75
End: 2020-08-28
Payer: MEDICARE

## 2020-08-28 ENCOUNTER — TELEPHONE (OUTPATIENT)
Dept: UROLOGY | Facility: CLINIC | Age: 75
End: 2020-08-28

## 2020-08-28 VITALS
SYSTOLIC BLOOD PRESSURE: 111 MMHG | DIASTOLIC BLOOD PRESSURE: 66 MMHG | WEIGHT: 242.31 LBS | HEIGHT: 68 IN | HEART RATE: 107 BPM | BODY MASS INDEX: 36.72 KG/M2

## 2020-08-28 DIAGNOSIS — N40.1 BENIGN PROSTATIC HYPERPLASIA WITH INCOMPLETE BLADDER EMPTYING: ICD-10-CM

## 2020-08-28 DIAGNOSIS — R33.9 URINARY RETENTION: Primary | ICD-10-CM

## 2020-08-28 DIAGNOSIS — R35.0 URINARY FREQUENCY: ICD-10-CM

## 2020-08-28 DIAGNOSIS — R39.14 BENIGN PROSTATIC HYPERPLASIA WITH INCOMPLETE BLADDER EMPTYING: ICD-10-CM

## 2020-08-28 LAB
BILIRUB SERPL-MCNC: ABNORMAL MG/DL
BLOOD URINE, POC: ABNORMAL
CLARITY, POC UA: CLEAR
COLOR, POC UA: YELLOW
GLUCOSE UR QL STRIP: ABNORMAL
KETONES UR QL STRIP: 15
LEUKOCYTE ESTERASE URINE, POC: ABNORMAL
NITRITE, POC UA: ABNORMAL
PH, POC UA: 6
POC RESIDUAL URINE VOLUME: 150 ML (ref 0–100)
PROTEIN, POC: 30
SPECIFIC GRAVITY, POC UA: 1.02
UROBILINOGEN, POC UA: 0.2

## 2020-08-28 PROCEDURE — 99214 OFFICE O/P EST MOD 30 MIN: CPT | Mod: S$PBB,25,, | Performed by: NURSE PRACTITIONER

## 2020-08-28 PROCEDURE — 81002 URINALYSIS NONAUTO W/O SCOPE: CPT | Mod: PBBFAC,PN | Performed by: NURSE PRACTITIONER

## 2020-08-28 PROCEDURE — 51798 US URINE CAPACITY MEASURE: CPT | Mod: PBBFAC,PN | Performed by: NURSE PRACTITIONER

## 2020-08-28 PROCEDURE — 51703 PR INSERTION OF TEMPORARY INDWELLING BLADDER CATHETERIZATION, COMPLICA: ICD-10-PCS | Mod: S$PBB,,, | Performed by: NURSE PRACTITIONER

## 2020-08-28 PROCEDURE — 99999 PR PBB SHADOW E&M-EST. PATIENT-LVL III: CPT | Mod: PBBFAC,,, | Performed by: NURSE PRACTITIONER

## 2020-08-28 PROCEDURE — 51701 INSERT BLADDER CATHETER: CPT | Mod: PBBFAC,PN | Performed by: NURSE PRACTITIONER

## 2020-08-28 PROCEDURE — 51703 INSERT BLADDER CATH COMPLEX: CPT | Mod: PBBFAC,PN | Performed by: NURSE PRACTITIONER

## 2020-08-28 PROCEDURE — 99213 OFFICE O/P EST LOW 20 MIN: CPT | Mod: PBBFAC,PN,25 | Performed by: NURSE PRACTITIONER

## 2020-08-28 PROCEDURE — 99214 PR OFFICE/OUTPT VISIT, EST, LEVL IV, 30-39 MIN: ICD-10-PCS | Mod: S$PBB,25,, | Performed by: NURSE PRACTITIONER

## 2020-08-28 PROCEDURE — 51703 INSERT BLADDER CATH COMPLEX: CPT | Mod: S$PBB,,, | Performed by: NURSE PRACTITIONER

## 2020-08-28 PROCEDURE — 99999 PR PBB SHADOW E&M-EST. PATIENT-LVL III: ICD-10-PCS | Mod: PBBFAC,,, | Performed by: NURSE PRACTITIONER

## 2020-08-28 RX ORDER — PHENAZOPYRIDINE HYDROCHLORIDE 200 MG/1
200 TABLET, FILM COATED ORAL 3 TIMES DAILY PRN
Qty: 30 TABLET | Refills: 1 | Status: SHIPPED | OUTPATIENT
Start: 2020-08-28 | End: 2020-09-07

## 2020-08-28 NOTE — PROGRESS NOTES
[facility] Note  Staff: FUNMI Avila    PCP: None on File    Chief Complaint: Trouble urinating    Subjective:        HPI: Manpreet Kerr is a 74 y.o. male presents today with problems urinating since recent knee surgery few days ago.    This patient had left knee arthroplasty on 8/25/2020 by Dr. Garcia and has been having increased urinary frequency and not urinating much since surgery.  Therefore he is here today for further evaluation of symptoms.     HX:  The pt was last seen by Dr. Latia Yoon on 02/06/2020 for hypogonadism and ED issues.  Pt is very pleased with the response that the Cialis 5 mg daily usage has been to his LUTS, but still requesting something for times during ED/relations at this time.  He denies gross hematuria and dysuria at this time.     Initially seen by MD in 2015 for low T and elevated PSA, and reports undergoing a prostate biopsy and US in the past with Dr. Romero in [place] in 2008.      He was living in the [facility] for a year and moved back to the  Sept 2013. He was      6/2016 - developed retention. Pvr: 400. had tried flomax in past but caused bad dreams, flu and fatigue however it helped.   3/2018 he developed retention, pvr: 500, 700cc drained. Tried alfuzosin, made him dizzy, however it helped.  Started on cialis 5mg daily for bph and ED and he has improvement on this.  Voiding trial after had pvr 21cc on fill and pull.      Also wants vas reversal. 27 yo wife. Seen someone at [facility] and reversal success 60%. On list of vasovasotomy at [facility].      On T therapy. Good energy level. Takes extra cialis when he needs to have an erection however viagra works better.      Sx of uti 7/15 and was started on cipro.  6-8 uti's in the last 2 years. Sx include burning, frequency, urethral itching, pressure in bladder, nocturia x2 (normally 1x a night). LN in left hip ache.      Couldn't tolerate flomax 0.4mg. Cysto trus on 8/12/19  showed b lobe hypertrophy, no stricture. 22g prostate. No IV median lobe. Ctu showed no obvious cause for uti's. Put him on cialis 5mg daily. residuals have been 30-50cc and he says he felt he had a better stream     Interval history:   10/15/19          UF on tadalafil 5mg daily : avg flow 11, voided vol: 320, pvr by scan: 56  ua had gluc at last visit, none today               REVIEW OF SYSTEMS:  A comprehensive 10 system review was performed and is negative except as noted above in HPI    PMHx:  Past Medical History:   Diagnosis Date    Allergy     Arthritis     BPH with obstruction/lower urinary tract symptoms     Environmental allergies     H/O prostatitis     History of urinary retention     [place] (hard of hearing)     WEARS BILATERAL HEARING AIDS    [place] (hard of hearing)     wears hearing aids    Hypertension     Sleep apnea     Urinary tract infection      PSHx:  Past Surgical History:   Procedure Laterality Date    HERNIA REPAIR      INGUNAL     KNEE ARTHROPLASTY Left 8/25/2020    Procedure: ARTHROPLASTY, KNEE;  Surgeon: Max Garcia MD;  Location: [facility] OR;  Service: Orthopedics;  Laterality: Left;    TRANSRECTAL ULTRASOUND EXAMINATION N/A 8/12/2019    Procedure: TRANSRECTAL ULTRASOUND;  Surgeon: Latia Yoon MD;  [facility];  Service: Urology;  Laterality: N/A;    VASECTOMY      WISDOM TOOTH EXTRACTION       Allergies:  Codeine, Morphine, and Sulfa (sulfonamide antibiotics)    Medications: reviewed   Objective:     Vitals:    08/28/20 1304   BP: 111/66   Pulse: 107     General:WDWN in NAD  Eyes: PERRLA, normal conjunctiva  Respiratory: no increased work on breathing, clear to auscultation  Cardiovascular: regular rate and rhythm. No obvious extremity edema.  GI: palpation of masses. No tenderness. No hepatosplenomegaly to palpation.  Musculoskeletal: normal range of motion of bilateral upper extremities. Normal muscle strength and tone.  Skin: no obvious rashes or  lesions. No tightening of skin noted.  Neurologic: CN grossly normal. Normal sensation.   Psychiatric: awake, alert and oriented x 3. Mood and affect normal. Cooperative.     Exam:  PVR by bladder scan performed by me today:  >150 mL  Therefore I inserted a 16 fr coude catheter into bladder with over 200 mL of urine residual collected at this time.  Cath connected to leg bag, 9-10 cc sterile water injected into the balloon at this time.  Pt tolerated procedure with no problems.    LABS REVIEW:  UA today:  Color:Clear, Yellow  Spec. Grav.  1.020  PH  6.0  15 mg/dL Ketones  Small Bili  30 Protein  Assessment:       1. Urinary retention    2. Benign prostatic hyperplasia with incomplete bladder emptying    3. Urinary frequency          Plan:   Trouble with urination:    Catheter care reviewed with pt and family during ov today.  All questions answered.  Pyridium prn bladder spasms prescribed to pt today.  Continue Cialis 5 mg daily as previously prescribed at this time.    F/u in one week to RTC in morning to have catheter removed for voiding trial.  Pt vu.    [facility]: Active    Pepper Hensley, TRAVISP-C

## 2020-08-28 NOTE — TELEPHONE ENCOUNTER
Spoke with patient had knee surgery and has been having urinary frequency and not urinating much since surgery. Appointment scheduled today at 3:30pm with jhonatan decker. Patient verbally voiced understanding.

## 2020-08-28 NOTE — TELEPHONE ENCOUNTER
----- Message from Estuardo Ward sent at 8/28/2020 10:04 AM CDT -----  Regarding: pt  Type: Needs Medical Advice    Who Called:  pt  Symptoms (please be specific):  frequent urination, bladder problems  How long has patient had these symptoms:  since epidural  Pharmacy name and phone #:    [facility] - [place], [place] - [address]  [address]  [place] [address]  Phone: [phone] Fax: [phone]    Best Call Back Number: [phone]   Additional Information: pt is following up on message sent yesterday in portal. Please let pt know as soon as possible.

## 2020-08-31 ENCOUNTER — OFFICE VISIT (OUTPATIENT)
Dept: UROLOGY | Facility: CLINIC | Age: 75
End: 2020-08-31
Payer: MEDICARE

## 2020-08-31 VITALS — BODY MASS INDEX: 36.72 KG/M2 | RESPIRATION RATE: 18 BRPM | WEIGHT: 242.31 LBS | HEIGHT: 68 IN

## 2020-08-31 DIAGNOSIS — N40.1 BENIGN PROSTATIC HYPERPLASIA WITH INCOMPLETE BLADDER EMPTYING: ICD-10-CM

## 2020-08-31 DIAGNOSIS — R33.9 URINARY RETENTION: Primary | ICD-10-CM

## 2020-08-31 DIAGNOSIS — R39.14 BENIGN PROSTATIC HYPERPLASIA WITH INCOMPLETE BLADDER EMPTYING: ICD-10-CM

## 2020-08-31 DIAGNOSIS — R35.0 URINARY FREQUENCY: ICD-10-CM

## 2020-08-31 PROCEDURE — 99213 PR OFFICE/OUTPT VISIT, EST, LEVL III, 20-29 MIN: ICD-10-PCS | Mod: S$PBB,,, | Performed by: NURSE PRACTITIONER

## 2020-08-31 PROCEDURE — 99999 PR PBB SHADOW E&M-EST. PATIENT-LVL III: ICD-10-PCS | Mod: PBBFAC,,, | Performed by: NURSE PRACTITIONER

## 2020-08-31 PROCEDURE — 99999 PR PBB SHADOW E&M-EST. PATIENT-LVL III: CPT | Mod: PBBFAC,,, | Performed by: NURSE PRACTITIONER

## 2020-08-31 PROCEDURE — 99213 OFFICE O/P EST LOW 20 MIN: CPT | Mod: S$PBB,,, | Performed by: NURSE PRACTITIONER

## 2020-08-31 PROCEDURE — 99213 OFFICE O/P EST LOW 20 MIN: CPT | Mod: PBBFAC,PN | Performed by: NURSE PRACTITIONER

## 2020-08-31 NOTE — PROGRESS NOTES
[facility] Note  Staff: FUNMI Avila    PCP: None    Chief Complaint: F/UP-Urinary Retention s/p Knee surgery    Subjective:        HPI: Manpreet Kerr is a 74 y.o. male presents today for followup.    This pt was seen by me on 08/28/2020 for trouble urinating.    Pt presented with problems urinating since recent knee surgery few days prior.  This patient had left knee arthroplasty on 8/25/2020 by Dr. Garcia and has been having increased urinary frequency and not urinating much since surgery.  Therefore he is here today for further evaluation of symptoms.      Exam:  PVR by bladder scan performed by me on 8/28/2020 showed:  >150 mL  Therefore I inserted a 16 fr coude catheter into bladder with over 200 mL of urine residual collected at time.      Today he arrives for removal of catheter and followup.  During the weekend overall pt doing well with no complaints voiced.    Last PSA Screening:   Lab Results   Component Value Date    PSA 0.48 07/31/2019    PSA 0.56 01/16/2012    PSA 0.36 01/05/2011    PSADIAG 0.38 03/06/2015    PSADIAG 0.50 04/11/2014     REVIEW OF SYSTEMS:  A comprehensive 10 system review was performed and is negative except as noted above in HPI    PMHx:  Past Medical History:   Diagnosis Date    Allergy     Arthritis     BPH with obstruction/lower urinary tract symptoms     Environmental allergies     H/O prostatitis     History of urinary retention     [place] (hard of hearing[place]     WEARS BILATERAL HEARING AIDS    [place]hard of hearing[place]     wears hearing aids    Hypertension     Sleep apnea     Urinary tract infection      PSHx:  Past Surgical History:   Procedure Laterality Date    HERNIA REPAIR      INGUNAL     KNEE ARTHROPLASTY Left 8/25/2020    Procedure: ARTHROPLASTY, KNEE;  Surgeon: Max Garcia MD;  Location[facility];  Service: Orthopedics;  Laterality: Left;    TRANSRECTAL ULTRASOUND EXAMINATION N/A 8/12/2019    Procedure:  TRANSRECTAL ULTRASOUND;  Surgeon: Latia Yoon MD;  Location: [facility] OR;  Service: Urology;  Laterality: N/A;    VASECTOMY      WISDOM TOOTH EXTRACTION       Allergies:  Codeine, Morphine, and Sulfa (sulfonamide antibiotics)    Medications: reviewed   Objective:     Vitals:    08/31/20 0952   Resp: 18     General:WDWN in NAD  Eyes: PERRLA, normal conjunctiva  Respiratory: no increased work on breathing, clear to auscultation  Cardiovascular: regular rate and rhythm. No obvious extremity edema.  GI: palpation of masses. No tenderness. No hepatosplenomegaly to palpation.  Musculoskeletal: normal range of motion of bilateral upper extremities. Normal muscle strength and tone.  Skin: no obvious rashes or lesions. No tightening of skin noted.  Neurologic: CN grossly normal. Normal sensation.   Psychiatric: awake, alert and oriented x 3. Mood and affect normal. Cooperative.    Ramos catheter removed during ov today by MA with no problems from pt noted at this time.    Assessment:       1. Urinary retention    2. Benign prostatic hyperplasia with incomplete bladder emptying    3. Urinary frequency          Plan:     Pt to continue Cialis daily, unable to take other alpha-blocker type meds due to side affects of them in his hx.    Pt was encouraged to increase intact of p.o. fluids today, and if unable to urinate on his own, then needed to go to nearest er for catheter reinsertion (pt lives in [place], [place])    F/u Should pt fail voiding trial, then will need another catheter inserted and to see Dr. Yoon for further evaluation with Cysto.    [facility]: Active    Pepper Hensley, FUNIM

## 2020-09-01 ENCOUNTER — TELEPHONE (OUTPATIENT)
Dept: ORTHOPEDICS | Facility: CLINIC | Age: 75
End: 2020-09-01

## 2020-09-01 NOTE — TELEPHONE ENCOUNTER
"[facility]  Neurology  Consult Note    Patient Name: Cristiano Cruz  MRN: [id number]  Admission Date: 1/26/2019  Hospital Length of Stay: 0 days  Code Status: Full Code   Attending Provider: Zack William MD   Consulting Provider: Malina Bright MD  Primary Care Physician: Primary Doctor No  Principal Problem:Generalized tonic-clonic seizure    Inpatient consult to neurology  Consult performed by: Malina Bright MD  Consult ordered by: Srikanth Dempsey MD         Subjective:     Chief Complaint:  Seizure     HPI:   Mr. Cruz is a 65 year old man with no known medical history who presents with a first time seizure. His wife says she woke up around 0500 to hear him breathing loudly, like he was catching his breath. She did not witness any shaking but says he was "foaming at the mouth". He was not responsive so EMS was called. Per EMS patient witnessed to have tonic-clonic movements lasting 5 minutes, with 20 minutes of confusion afterwards. No tongue biting or incontinence. Upon arrival to the ED he had an episode described by nursing as "tonic-clonic/decorticate posturing with right eye gaze - lasted approximately 3 minutes".     Family denies a history of seizure in the patient, no history of head trauma. They do report a family history of seizures in his sister and niece.     Labs remarkable for , lactate 3.4 (improved to 1.3)     History reviewed. No pertinent past medical history.    Past Surgical History:   Procedure Laterality Date    HERNIA REPAIR         Review of patient's allergies indicates:  No Known Allergies    Current Neurological Medications: levetiracetam    No current facility-administered medications on file prior to encounter.      Current Outpatient Medications on File Prior to Encounter   Medication Sig    HYDROmorphone (DILAUDID) 2 MG tablet Take 1 tablet (2 mg total) by mouth every 4 (four) hours as needed for Pain (severe pain 7-10/10).    ondansetron " ----- Message from Marah Melendez sent at 9/1/2020 11:00 AM CDT -----  Contact: self/ [phone]  Patient was in the hospital last week and tested negative for COVID however his wife tested positive and was there with him and the patient wanted someone to know this. Please call and advise.      (ZOFRAN) 8 MG tablet Take 1 tablet (8 mg total) by mouth every 8 (eight) hours as needed for Nausea.    oxycodone-acetaminophen (PERCOCET) 5-325 mg per tablet Take 1 tablet by mouth every 4 (four) hours as needed for Pain (moderate pain 4-6/10).     Family History     None        Tobacco Use    Smoking status: Former Smoker   Substance and Sexual Activity    Alcohol use: No    Drug use: No    Sexual activity: Yes     Partners: Female     Review of Systems   Constitutional: Negative for activity change, chills and fever.   HENT: Negative for sinus pressure and trouble swallowing.    Eyes: Negative for pain and visual disturbance.   Respiratory: Negative for cough, chest tightness and shortness of breath.    Cardiovascular: Negative for chest pain and palpitations.   Gastrointestinal: Negative for abdominal pain, diarrhea, nausea and vomiting.   Genitourinary: Negative for difficulty urinating and dysuria.   Musculoskeletal: Negative for back pain and neck pain.   Skin: Negative for rash and wound.   Neurological: Negative for weakness, numbness and headaches.   Psychiatric/Behavioral: Negative for agitation and confusion.     Objective:     Vital Signs (Most Recent):  Temp: 98.6 °F (37 °C) (01/26/19 1702)  Pulse: 92 (01/26/19 1702)  Resp: (!) 26 (01/26/19 1702)  BP: (!) 140/72 (01/26/19 1702)  SpO2: 97 % (01/26/19 1702) Vital Signs (24h Range):  Temp:  [98.3 °F (36.8 °C)-98.7 °F (37.1 °C)] 98.6 °F (37 °C)  Pulse:  [] 92  Resp:  [11-26] 26  SpO2:  [87 %-100 %] 97 %  BP: (113-151)/(64-99) 140/72     Weight: 73.5 kg (162 lb)  Body mass index is 25.37 kg/m².    Physical Exam   Constitutional: He appears well-developed and well-nourished.   HENT:   Head: Normocephalic and atraumatic.   Eyes: EOM are normal. Pupils are equal, round, and reactive to light.   Pulmonary/Chest: Effort normal. No respiratory distress.   Abdominal: He exhibits no distension.   Neurological: He has normal strength.   Reflex Scores:        Tricep reflexes are 2+ on the right side and 2+ on the left side.       Bicep reflexes are 2+ on the right side and 2+ on the left side.       Brachioradialis reflexes are 2+ on the right side and 2+ on the left side.       Patellar reflexes are 2+ on the right side and 2+ on the left side.       Achilles reflexes are 2+ on the right side and 2+ on the left side.  Skin: Skin is warm and dry.   Psychiatric: He has a normal mood and affect. His speech is normal and behavior is normal.   Nursing note and vitals reviewed.      NEUROLOGICAL EXAMINATION:     MENTAL STATUS   Attention: normal. Concentration: normal.   Speech: speech is normal   Level of consciousness: alert    CRANIAL NERVES     CN III, IV, VI   Pupils are equal, round, and reactive to light.  Extraocular motions are normal.     CN V   Facial sensation intact.     CN VII   Facial expression full, symmetric.     CN XII   Tongue deviation: none    MOTOR EXAM   Muscle bulk: normal  Overall muscle tone: normal    Strength   Strength 5/5 throughout.     REFLEXES     Reflexes   Right brachioradialis: 2+  Left brachioradialis: 2+  Right biceps: 2+  Left biceps: 2+  Right triceps: 2+  Left triceps: 2+  Right patellar: 2+  Left patellar: 2+  Right achilles: 2+  Left achilles: 2+  Right plantar: equivocal  Left plantar: equivocal    SENSORY EXAM   Light touch normal.     GAIT AND COORDINATION     Tremor   Resting tremor: absent  Intention tremor: absent  Action tremor: absent      Significant Labs:   CBC:   Recent Labs   Lab 01/26/19  0623   WBC 8.34   HGB 14.1   HCT 46.0        CMP:   Recent Labs   Lab 01/26/19  0623   *      K 4.7      CO2 20*   BUN 13   CREATININE 1.1   CALCIUM 9.4   PROT 8.1   ALBUMIN 3.9   BILITOT 0.3   ALKPHOS 79   AST 20   ALT 23   ANIONGAP 15   EGFRNONAA >60.0       Significant Imaging: I have reviewed all pertinent imaging results/findings within the past 24 hours.    Assessment and Plan:     * Generalized  tonic-clonic seizure    New onset seizure without any provoking factors. Labs unremarkable for infectious or metabolic derangements, Utox negative and MRI negative for acute stroke or other abnormalities. Etiology remains unclear at this time, but due to recurrent seizures recommend continue AEDs for now. Loaded with levetiracetam 2g in ED.    Recommendations:  -- Continue keppra 500mg BID - low threshold to increase  -- EEG pending  -- Seizure precautions; ativan IV prn for seizure >5 minutes     Lactic acidosis    -- resolved quickly  -- likely related to seizure activity  -- will defer any additional workup to primary team         VTE Risk Mitigation (From admission, onward)        Ordered     Place sequential compression device  Until discontinued      01/26/19 1029     IP VTE LOW RISK PATIENT  Once      01/26/19 0900          Thank you for your consult. I will follow-up with patient. Please contact us if you have any additional questions.    Malina Bright MD  Neurology  [facility]

## 2020-09-03 ENCOUNTER — PATIENT MESSAGE (OUTPATIENT)
Dept: UROLOGY | Facility: CLINIC | Age: 75
End: 2020-09-03

## 2020-09-03 NOTE — TELEPHONE ENCOUNTER
Patient was catheterized at [facility] after going a few days without catheter. He is scheduled to follow up with Dr. Yoon on 9/15.

## 2020-09-08 ENCOUNTER — PATIENT MESSAGE (OUTPATIENT)
Dept: UROLOGY | Facility: CLINIC | Age: 75
End: 2020-09-08

## 2020-09-15 ENCOUNTER — PATIENT MESSAGE (OUTPATIENT)
Dept: UROLOGY | Facility: CLINIC | Age: 75
End: 2020-09-15

## 2020-09-15 ENCOUNTER — OFFICE VISIT (OUTPATIENT)
Dept: UROLOGY | Facility: CLINIC | Age: 75
End: 2020-09-15
Payer: MEDICARE

## 2020-09-15 DIAGNOSIS — N40.0 BENIGN PROSTATIC HYPERPLASIA, UNSPECIFIED WHETHER LOWER URINARY TRACT SYMPTOMS PRESENT: Primary | ICD-10-CM

## 2020-09-15 DIAGNOSIS — N52.9 ERECTILE DYSFUNCTION, UNSPECIFIED ERECTILE DYSFUNCTION TYPE: ICD-10-CM

## 2020-09-15 DIAGNOSIS — R33.9 URINARY RETENTION: ICD-10-CM

## 2020-09-15 PROCEDURE — 99214 PR OFFICE/OUTPT VISIT, EST, LEVL IV, 30-39 MIN: ICD-10-PCS | Mod: 95,,, | Performed by: UROLOGY

## 2020-09-15 PROCEDURE — 99214 OFFICE O/P EST MOD 30 MIN: CPT | Mod: 95,,, | Performed by: UROLOGY

## 2020-09-15 RX ORDER — TAMSULOSIN HYDROCHLORIDE 0.4 MG/1
0.4 CAPSULE ORAL
Qty: 90 CAPSULE | Refills: 0 | Status: SHIPPED | OUTPATIENT
Start: 2020-09-15 | End: 2020-12-10

## 2020-09-15 NOTE — PATIENT INSTRUCTIONS
He is willing to try flomax again  Take at night  Stand slowly  Expect retrograde ejaculation     Will take for 2 nights  He will remove catheter on Wednesday night at 11pm  Return Thursday morning to our clinic for pvr (has appt with Prabhakar)  If residual >350, note if he has urge to void and replace catheter  If catheter needs to be replaced   Then will need cystoscopy/trus to work up enlarged prostate  If no urge to void will need urodynamics to ensure he develops bladder pressure.     If he tolerates flomax and emptying can change to tadalafil 20mg as needed for ED.

## 2020-09-15 NOTE — PROGRESS NOTES
The patient location is: home  Date of Service: 09/15/2020   The chief complaint leading to consultation is: see hpi and visit diagnosis  Visit type: Virtual visit with Real Time synchronous AUDIO and VIDEO     Each patient to whom he or she provides medical services by telemedicine is:    (1) informed of the relationship between the physician and patient and the respective role of any other health care provider with respect to management of the patient; and   (2) notified that he or she may decline to receive medical services by telemedicine and may withdraw from such care at any time.    [facility] Note  Staff: MD Karrie  Referring: TONY Meadows    My chart: active    Chief Complaint: No chief complaint on file.      Subjective:        HPI: Manpreet Kerr is a 74 y.o. male presents with hypogonadism and ED     Initially seen by me in 2015 for low T and elevated PSA, and reports undergoing a prostate biopsy and US in the past with Dr. Romero in [place] in 2008.     He was living in the [facility] for a year and moved back to the  Sept 2013. He was     6/2016 - developed retention. Pvr: 400. had tried flomax in past but caused bad dreams, flu and fatigue however it helped.   3/2018 he developed retention, pvr: 500, 700cc drained. Tried alfuzosin, made him dizzy, however it helped.  Started on cialis 5mg daily for bph and ED and he has improvement on this.  Voiding trial after had pvr 21cc on fill and pull.     Also wants vas reversal. 25 yo wife. Seen someone at [facility] and reversal success 60%. On list of vasovasotomy at [employer].     On T therapy. Good energy level. Takes extra cialis when he needs to have an erection however viagra works better.     Sx of uti 7/15 and was started on cipro.  6-8 uti's in the last 2 years. Sx include burning, frequency, urethral itching, pressure in bladder, nocturia x2 (normally 1x a night). LN in left hip ache.     Couldn't tolerate flomax  "0.4mg. Cysto trus on 8/12/19 showed b lobe hypertrophy, no stricture. 22g prostate. No IV median lobe. Ctu showed no obvious cause for uti's. Put him on cialis 5mg daily. residuals have been 30-50cc and he says he felt he had a better stream    Interval history 2/6/20   10/15/19 UF on tadalafil 5mg daily : avg flow 11, voided vol: 320, pvr by scan: 56  ua had gluc at last visit, none today   Lab Results   Component Value Date    HGBA1C 5.6 10/21/2019     Has not had a uti in 6 months.  Very sens to pepper/paprika - went to UC and was given pyridium but has only had to use once, he has been carefully reviewing labels  pvr by scan today: 62  On tadalafil 5mg daily but he spoke with an "online on call urologist"  with a [place] pharmacy and took 10mg once a day and says this helped with his ED. He also did web searching and increased his dose to 20mg as needed.    Interval history 9/15/20:   UF 2/20/20 peak flow 14.3, avg flow 7.9, voided vol: 257, pvr by scan: 25    He saw brianne on 3/13/20 and was happy with tadalafil 5mg daily for bph but not ED. She again told him to take the 5mg daily and not increase the dose if he's doing it daily.     He underwent knee replacement on 8/25/20. Saw brianne on 8/28/20 and was c/o decreased UOP but with increased frequency. 16fr coude placed and 200cc drained. His marie was removed 8/31/20 and he had his catheter removed    He is not taking pain medicines,he's having a BM every day  No longer trying to get wife pregnant    Urine history:   2/6/20  Tr leuk  10/15/19 neg/100 glucose  8/12/19 Ng, void: n/a  7/30/19 No cx, void:neg,   7/18/19 No cx, void:  tr leuk - on cipro 3rd day  10/16/18 No cx, void: neg, , mycoplasma and urea neg   9/1/18  Multiple org, void: sml leuk, 30-50 wbc  3/8/18  Pvr: 21 (fill a nd pull)   3/2018  Pvr by I&O: 700cc   10/29/17 No cx, void:  Neg  6/2/16  E.coli  6/7/16  Pvr: 11cc  5/31/16 pvr by I&O:  460cc   2/24/15 No cx, void:neg  1/7/15  E.coli res to " amp, cipro, lev, tetra, void: neg  10/9/14 No cx, void: neg  4/9/14  No c,x void: neg  3/15/10 Ng, void:  neg  11/6/08 Multiple org    psa history: MGF - had prostate cancer at a.84  2/6/20  pvr by scan: 62  10/15/19 UF: avg flow 11, voided vol: 320, pvr by scan: 56  8/12/19 Cysto trus 22.2g prostate with mod b lobe hypertrophy and no IV median lobe. pvr by aspiration: 60cc. Recommended urolift.   7/31/19 0.48  7/30/19 DARRYL: 30g, firm on right, pvr by scan: 30cc  7/18/19  pvr: 55cc. UF avg flow 2.9mL/s, voided volume: 37. Pvr: 63cc   10/16/18  pvr: 0  12/15/17 0.458  2016  0.31  2015  0.38  2014  0.5  1/16/12 0.56   1/5/11  0.36  1/6/10  0.30  2008  Negative prostate biopsy     Testosterone history: currently on 1.62% 3 pumps a day managed by pcp  12/15/17 400, psa 0.458 on 1.62% 3 pumps a day   3/26/15 161, 15.1/442.2, psa 0.38 on androgel 2 pumps a day  10/16/14 180  4/11/14 354  2012  androgel 2 pumps a day   1/15/19 177      REVIEW OF SYSTEMS:  General ROS: no fevers, no chills  Psychological ROS: no depression  Endocrine ROS: no heat or cold  Respiratory ROS: no SOB  Cardiovascular ROS: no CP  Gastrointestinal ROS: no abdominal pain, no constipation, no diarrhea, no BRBPR  Musculoskeletal ROS: no muscle pain  Neurological ROS: no headaches  Dermatological ROS: no rashes  HEENT: +glasses, + sinus   ROS: per HPI     PMHx:  Past Medical History:   Diagnosis Date    Allergy     Arthritis     BPH with obstruction/lower urinary tract symptoms     Environmental allergies     H/O prostatitis     History of urinary retention     [place] (hard of hearing)     WEARS BILATERAL HEARING AIDS    [place] (hard of hearing)     wears hearing aids    Hypertension     Sleep apnea     Urinary tract infection    hypogonadism  Erectile dysfunction  Kidney stones: No    PSHx:  Past Surgical History:   Procedure Laterality Date    HERNIA REPAIR      INGUNAL     KNEE ARTHROPLASTY Left 8/25/2020    Procedure: ARTHROPLASTY, KNEE;   Surgeon: Max Garcia MD;  Location: [facility] OR;  Service: Orthopedics;  Laterality: Left;    TRANSRECTAL ULTRASOUND EXAMINATION N/A 8/12/2019    Procedure: TRANSRECTAL ULTRASOUND;  Surgeon: Latia Yoon MD;  [facility];  Service: Urology;  Laterality: N/A;    VASECTOMY      WISDOM TOOTH EXTRACTION     left IHr    Stents/Valves/Foreign Bodies: No  Cardiac Evaluation: . Cardiac cath 9/26/19  Intermediate stenosis of proximal LAD, referred for FFR. Uploaded to media 10/15/19  Colonoscopy: yes, twice, last one 9 years ago - due for one    Fam Hx:  MGF - had prostate cancer at a.84  No kidney stones    Soc Hx:  No tobacco.    No alcohol  Lives in [place]   :yes  Children: 3  Occupation:retired environmental science and navy officer    Allergies:  Codeine, Morphine, and Sulfa (sulfonamide antibiotics)   Sulfa     Urologic Medications:   Anticoagulation: Yes - asa 81mg daily    Objective:     No vitals or exam beyond what is listed below performed due to virtual visit (COVID)  Neuro: awake, alert and oriented  Musculoskeletal: normal range of motion       7/20/19  Inspection of anus and perineum normal  No scrotal rashes, cysts or lesions  Epididymis normal in size, no tenderness  Testes normal and size, equal size bilaterally, no masses  Urethral meatus normal without discharge  Penis is circumcised  DARRYL: 30, firm on right, boggy no nodules,no tenderness. SV not palpable. Normal sphincter tone. Nohemhorroids.  No bilateral inguinal hernias noted   [facility] scar        LABS REVIEW:  Recent Labs   Lab 03/09/20  0847 08/13/20  0846 08/26/20  0538   WBC 5.30 5.22 8.56   Hemoglobin 15.7 16.0 14.0   Hematocrit 49.0 49.2 42.9   Platelets 142 L 164 128 L   ]  Recent Labs   Lab 03/09/20  0847 08/13/20  0848 08/26/20  0538   Sodium 137 140 138   Potassium 4.5 4.0 4.0   Chloride 105 106 108   CO2 24 24 22 L   BUN, Bld 14 15 12   Creatinine 1.0 1.0 0.9   Glucose 97 105 128 H   Calcium 9.3  9.3 8.2 L   ]    Lab Results   Component Value Date    HGBA1C 5.6 10/21/2019       PATHOLOGY REVIEW: See hpi for recent     none    RADIOGRAPHIC REVIEW: See [facility] for recent     ctu 7/31/19- He appears to have a normal positioned left kidney.  It looks like he also has a benign small 1 cm adrenal lesion    The liver is of normal size and CT density.  Identified in the central right lobe is a 8.9 cm cyst.  This appears increased in size from the prior ultrasound when it measured 6.7 cm.  A 2nd small cyst measures 11 mm.  There are calcifications in the liver and spleen parenchyma consistent with prior granulomatous disease.  The gallbladder is of normal size without CT evidence of stone.  The pancreas is of normal contour and CT density without edema or mass.  The spleen is of normal size.    There is a 1.7 x 0.9 cm soft tissue density mass in the left adrenal bed a probable adenoma, less likely a lymph node.  This was not noted on the prior ultrasound.  Similar finding is not seen on the right.  The kidneys are of normal size contour and contrast enhancement without mass stone or hydronephrosis.  The ureters follow a normal course down to the bladder without dilation or stone.  The abdominal aorta contains atherosclerotic calcification without aneurysm.  Retroperitoneal adenopathy is not otherwise seen.    The stomach is of normal configuration.  Small bowel dilatation or air-fluid levels are not seen.  A normal appendix is seen.  There are several diverticuli noted in the sigmoid colon without CT evidence of diverticulitis.  No free fluid is noted.    The bladder is of normal contour without mass or asymmetry.  The prostate is not enlarged.  No free fluid is seen in the pelvis.    ctrss 9/1/18  RML clacified granuloma  Left kd partially rotated  inocomplete emptying of bladder  Mild urinary bladder wall thickening         Assessment:       1. Benign prostatic hyperplasia, unspecified whether lower urinary tract  symptoms present    2. Erectile dysfunction, unspecified erectile dysfunction type    3. Urinary retention          Plan:         He is willing to try flomax again  Take at night  Stand slowly  Expect retrograde ejaculation     Will take for 2 nights  He will remove catheter on Wednesday night at 11pm  Return Thursday morning to our clinic for pvr (has appt with [facility])  If residual >350, note if he has urge to void and replace catheter  If catheter needs to be replaced   Then will need cystoscopy/trus to work up enlarged prostate  If no urge to void will need urodynamics to ensure he develops bladder pressure.     If he tolerates flomax and emptying can change to tadalafil 20mg as needed for ED.     Latia Yoon MD

## 2020-09-16 ENCOUNTER — TELEPHONE (OUTPATIENT)
Dept: UROLOGY | Facility: CLINIC | Age: 75
End: 2020-09-16

## 2020-09-16 ENCOUNTER — PATIENT MESSAGE (OUTPATIENT)
Dept: UROLOGY | Facility: CLINIC | Age: 75
End: 2020-09-16

## 2020-09-16 DIAGNOSIS — M25.562 LEFT KNEE PAIN, UNSPECIFIED CHRONICITY: Primary | ICD-10-CM

## 2020-09-17 ENCOUNTER — HOSPITAL ENCOUNTER (OUTPATIENT)
Dept: RADIOLOGY | Facility: HOSPITAL | Age: 75
Discharge: HOME | End: 2020-09-17
Attending: ORTHOPAEDIC SURGERY
Payer: MEDICARE

## 2020-09-17 ENCOUNTER — PATIENT MESSAGE (OUTPATIENT)
Dept: UROLOGY | Facility: CLINIC | Age: 75
End: 2020-09-17

## 2020-09-17 ENCOUNTER — OFFICE VISIT (OUTPATIENT)
Dept: ORTHOPEDICS | Facility: CLINIC | Age: 75
End: 2020-09-17
Payer: MEDICARE

## 2020-09-17 VITALS — BODY MASS INDEX: 36.68 KG/M2 | RESPIRATION RATE: 16 BRPM | WEIGHT: 242 LBS | HEIGHT: 68 IN

## 2020-09-17 DIAGNOSIS — M25.562 LEFT KNEE PAIN, UNSPECIFIED CHRONICITY: ICD-10-CM

## 2020-09-17 DIAGNOSIS — Z96.652 STATUS POST TOTAL LEFT KNEE REPLACEMENT USING CEMENT: Primary | ICD-10-CM

## 2020-09-17 PROCEDURE — 99999 PR PBB SHADOW E&M-EST. PATIENT-LVL III: ICD-10-PCS | Mod: PBBFAC,,, | Performed by: ORTHOPAEDIC SURGERY

## 2020-09-17 PROCEDURE — 99024 POSTOP FOLLOW-UP VISIT: CPT | Mod: POP,,, | Performed by: ORTHOPAEDIC SURGERY

## 2020-09-17 PROCEDURE — 99024 PR POST-OP FOLLOW-UP VISIT: ICD-10-PCS | Mod: POP,,, | Performed by: ORTHOPAEDIC SURGERY

## 2020-09-17 PROCEDURE — 73560 XR KNEE 1 OR 2 VIEW LEFT: ICD-10-PCS | Mod: 26,LT,, | Performed by: RADIOLOGY

## 2020-09-17 PROCEDURE — 99999 PR PBB SHADOW E&M-EST. PATIENT-LVL III: CPT | Mod: PBBFAC,,, | Performed by: ORTHOPAEDIC SURGERY

## 2020-09-17 PROCEDURE — 73560 X-RAY EXAM OF KNEE 1 OR 2: CPT | Mod: 26,LT,, | Performed by: RADIOLOGY

## 2020-09-17 PROCEDURE — 73560 X-RAY EXAM OF KNEE 1 OR 2: CPT | Mod: TC,PN,LT

## 2020-09-17 PROCEDURE — 99213 OFFICE O/P EST LOW 20 MIN: CPT | Mod: PBBFAC,25,PN | Performed by: ORTHOPAEDIC SURGERY

## 2020-09-18 ENCOUNTER — CLINICAL SUPPORT (OUTPATIENT)
Dept: UROLOGY | Facility: CLINIC | Age: 75
End: 2020-09-18
Payer: MEDICARE

## 2020-09-18 DIAGNOSIS — R33.9 URINARY RETENTION: Primary | ICD-10-CM

## 2020-09-18 LAB — POC RESIDUAL URINE VOLUME: 17 ML (ref 0–100)

## 2020-09-18 PROCEDURE — 51798 US URINE CAPACITY MEASURE: CPT | Mod: PBBFAC,PN

## 2020-09-18 PROCEDURE — 99499 UNLISTED E&M SERVICE: CPT | Mod: S$PBB,,, | Performed by: UROLOGY

## 2020-09-18 PROCEDURE — 99499 NO LOS: ICD-10-PCS | Mod: S$PBB,,, | Performed by: UROLOGY

## 2020-09-18 NOTE — Clinical Note
Please make 6 month f/u with pvr  See if he is still taking the flomax  If not, he should continue if he can tolerate it

## 2020-09-22 ENCOUNTER — TELEPHONE (OUTPATIENT)
Dept: DERMATOLOGY | Facility: CLINIC | Age: 75
End: 2020-09-22

## 2020-09-22 ENCOUNTER — PATIENT MESSAGE (OUTPATIENT)
Dept: ORTHOPEDICS | Facility: CLINIC | Age: 75
End: 2020-09-22

## 2020-09-22 NOTE — TELEPHONE ENCOUNTER
Returned pt call. I have him scheduled to come in on 10/13/20 @115 for itching. I also added him to the waitlist.    TB    ----- Message from Luisana Berry sent at 9/22/2020  9:03 AM CDT -----  Contact: Pt [phone]  Caller is requesting an earlier appt than we can schedule.  Caller declined first available appointment listed below. Caller will not accept being placed on the wait list and is requesting a message be sent to the provider.  When is the next available appointment:  n/a  Did you offer to schedule the next available appt and put the patient on the wait list?:     What visit type: ep  Symptoms:  itching on back   Patient preference of timeframe to be scheduled:  asap  What is the reason the patient is requesting a sooner appointment? (insurance terminating, changing jobs):    Would the patient rather a call back or a response via [employer]?:  call  Comments:

## 2020-09-24 ENCOUNTER — PATIENT MESSAGE (OUTPATIENT)
Dept: UROLOGY | Facility: CLINIC | Age: 75
End: 2020-09-24

## 2020-09-25 ENCOUNTER — PATIENT MESSAGE (OUTPATIENT)
Dept: UROLOGY | Facility: CLINIC | Age: 75
End: 2020-09-25

## 2020-10-01 ENCOUNTER — CLINICAL SUPPORT (OUTPATIENT)
Dept: UROLOGY | Facility: CLINIC | Age: 75
End: 2020-10-01
Payer: MEDICARE

## 2020-10-01 DIAGNOSIS — R33.9 URINARY RETENTION: Primary | ICD-10-CM

## 2020-10-01 LAB — POC RESIDUAL URINE VOLUME: 35 ML (ref 0–100)

## 2020-10-01 PROCEDURE — 99499 UNLISTED E&M SERVICE: CPT | Mod: S$PBB,,, | Performed by: UROLOGY

## 2020-10-01 PROCEDURE — 99499 NO LOS: ICD-10-PCS | Mod: S$PBB,,, | Performed by: UROLOGY

## 2020-10-01 PROCEDURE — 51798 US URINE CAPACITY MEASURE: CPT | Mod: PBBFAC,PN

## 2020-10-01 NOTE — PROGRESS NOTES
Per  patient arrived in clinic today for a pvr.  Patient ambulated to restroom successfully emptied bladder.  pvr by bladder scan showed 35ml residual.

## 2020-10-29 ENCOUNTER — OFFICE VISIT (OUTPATIENT)
Dept: ORTHOPEDICS | Facility: CLINIC | Age: 75
End: 2020-10-29
Payer: MEDICARE

## 2020-10-29 VITALS — HEIGHT: 68 IN | RESPIRATION RATE: 18 BRPM | BODY MASS INDEX: 36.68 KG/M2 | WEIGHT: 242 LBS

## 2020-10-29 DIAGNOSIS — Z96.652 STATUS POST TOTAL LEFT KNEE REPLACEMENT USING CEMENT: Primary | ICD-10-CM

## 2020-10-29 PROCEDURE — 99024 PR POST-OP FOLLOW-UP VISIT: ICD-10-PCS | Mod: POP,,, | Performed by: ORTHOPAEDIC SURGERY

## 2020-10-29 PROCEDURE — 99999 PR PBB SHADOW E&M-EST. PATIENT-LVL III: CPT | Mod: PBBFAC,,, | Performed by: ORTHOPAEDIC SURGERY

## 2020-10-29 PROCEDURE — 99024 POSTOP FOLLOW-UP VISIT: CPT | Mod: POP,,, | Performed by: ORTHOPAEDIC SURGERY

## 2020-10-29 PROCEDURE — 99999 PR PBB SHADOW E&M-EST. PATIENT-LVL III: ICD-10-PCS | Mod: PBBFAC,,, | Performed by: ORTHOPAEDIC SURGERY

## 2020-10-29 PROCEDURE — 99213 OFFICE O/P EST LOW 20 MIN: CPT | Mod: PBBFAC,PN | Performed by: ORTHOPAEDIC SURGERY

## 2020-10-29 NOTE — PROGRESS NOTES
Past Medical History:   Diagnosis Date    Allergy     Arthritis     BPH with obstruction/lower urinary tract symptoms     Environmental allergies     H/O prostatitis     History of urinary retention     [place] (hard of hearing)     WEARS BILATERAL HEARING AIDS    [place] (hard of hearing)     wears hearing aids    Hypertension     Sleep apnea     Urinary tract infection        Past Surgical History:   Procedure Laterality Date    HERNIA REPAIR      INGUNAL     KNEE ARTHROPLASTY Left 8/25/2020    Procedure: ARTHROPLASTY, KNEE;  Surgeon: Max Garcia MD;  Location: [facility] OR;  Service: Orthopedics;  Laterality: Left;    TRANSRECTAL ULTRASOUND EXAMINATION N/A 8/12/2019    Procedure: TRANSRECTAL ULTRASOUND;  Surgeon: Latia Yoon MD;  Location[facility] OR;  Service: Urology;  Laterality: N/A;    VASECTOMY      WISDOM TOOTH EXTRACTION         Current Outpatient Medications   Medication Sig    acetaminophen (TYLENOL) 325 MG tablet Take 325 mg by mouth every 6 (six) hours as needed for Pain.    ANDROGEL 20.25 mg/1.25 gram (1.62 %) GlPm     aspirin (ECOTRIN) 81 MG EC tablet Take 81 mg by mouth once daily.    azelastine (ASTELIN) 137 mcg nasal spray 2 sprays (274 mcg total) by Nasal route 2 (two) times daily.    diclofenac sodium (VOLTAREN) 1 % Gel     diphenhydrAMINE (BENADRYL) 50 MG capsule Take 50 mg by mouth every 6 (six) hours as needed for Itching.    fluocinolone (DERMA-SMOOTHE) 0.01 % external oil Apply topically 3 (three) times daily. Apply to damp scalp at bedtime, wash off qam, avoid chronic use.    fluticasone propionate (FLONASE) 50 mcg/actuation nasal spray     losartan (COZAAR) 100 MG tablet     montelukast (SINGULAIR) 10 mg tablet TAKE 1 TABLET DAILY    omeprazole (PRILOSEC) 10 MG capsule Take 10 mg by mouth once daily.    tadalafiL (CIALIS) 5 MG tablet Take 1 tablet (5 mg) by mouth daily as needed for Erectile Dysfunction.    tamsulosin (FLOMAX) 0.4 mg Cap Take 1  capsule (0.4 mg total) by mouth after dinner.    triamcinolone acetonide 0.1% (KENALOG) 0.1 % cream aaa bid x 2 weeks then prn, avoid chronic use     No current facility-administered medications for this visit.        Review of patient's allergies indicates:   Allergen Reactions    Adhesive     Codeine Hives    Morphine Hives    Sulfa (sulfonamide antibiotics) Hives and Itching       Family History   Problem Relation Age of Onset    Dementia Mother     Cancer Father         skin    Heart disease Father     Allergic rhinitis Neg Hx     Allergies Neg Hx     Angioedema Neg Hx     Asthma Neg Hx     Atopy Neg Hx     Eczema Neg Hx     Immunodeficiency Neg Hx     Rhinitis Neg Hx     Urticaria Neg Hx        Social History     Socioeconomic History    Marital status:      Spouse name: Not on file    Number of children: Not on file    Years of education: Not on file    Highest education level: Not on file   Occupational History    Not on file   Social Needs    Financial resource strain: Not on file    Food insecurity     Worry: Not on file     Inability: Not on file    Transportation needs     Medical: Not on file     Non-medical: Not on file   Tobacco Use    Smoking status: Former Smoker     Packs/day: 1.00     Years: 10.00     Pack years: 10.00     Quit date: 4/3/1974     Years since quittin.6    Smokeless tobacco: Never Used   Substance and Sexual Activity    Alcohol use: No    Drug use: No    Sexual activity: Not on file   Lifestyle    Physical activity     Days per week: Not on file     Minutes per session: Not on file    Stress: Not on file   Relationships    Social connections     Talks on phone: Not on file     Gets together: Not on file     Attends [employer] service: Not on file     Active member of club or organization: Not on file     Attends meetings of clubs or organizations: Not on file     Relationship status: Not on file   Other Topics Concern    Not on file    Social History Narrative    Not on file       Chief Complaint:   Chief Complaint   Patient presents with    Post-op Evaluation     s/p left knee TKA 8/25/20        Date of surgery:  August 25, 2020    History of present illness:  74-year-old male underwent left total knee arthroplasty about 8 weeks ago.  Patient has done well in regards the actual knee.  Patient has had some issues with itching likely from the Vicryl breaking down.  Better with Benadryl cream.  Not taking any pain medicine.    Review of Systems:    Musculoskeletal:  See HPI        Physical Examination:    Vital Signs:    Vitals:    10/29/20 0946   Resp: 18       Body mass index is 36.8 kg/m².    This a well-developed, well nourished patient in no acute distress.  They are alert and oriented and cooperative to examination.  Pt. walks without an antalgic gait.      Examination left knee shows healed surgical incision.  No erythema or drainage.  Patient has good range of motion for about 0° to 120°.  Negative anterior drawer exam.  Stable to varus and valgus stress.    X-rays:  Two views left knee are  reviewed which show well-aligned total knee arthroplasty components without complication     Assessment::  Status post left Evelia CR total knee arthroplasty    Plan:  Continue with physical therapy exercises.  Continue the Benadryl for the itching.  I will see him back in 6 weeks with four views of the left knee.    This note was created using [employer] voice recognition software that occasionally misinterpreted phrases or words.

## 2020-12-03 ENCOUNTER — PATIENT MESSAGE (OUTPATIENT)
Dept: ORTHOPEDICS | Facility: CLINIC | Age: 75
End: 2020-12-03

## 2020-12-09 DIAGNOSIS — Z96.652 STATUS POST TOTAL LEFT KNEE REPLACEMENT USING CEMENT: Primary | ICD-10-CM

## 2020-12-10 ENCOUNTER — HOSPITAL ENCOUNTER (OUTPATIENT)
Dept: RADIOLOGY | Facility: HOSPITAL | Age: 75
Discharge: HOME | End: 2020-12-10
Attending: ORTHOPAEDIC SURGERY
Payer: MEDICARE

## 2020-12-10 ENCOUNTER — OFFICE VISIT (OUTPATIENT)
Dept: ORTHOPEDICS | Facility: CLINIC | Age: 75
End: 2020-12-10
Payer: MEDICARE

## 2020-12-10 VITALS — HEIGHT: 68 IN | RESPIRATION RATE: 16 BRPM | WEIGHT: 242 LBS | BODY MASS INDEX: 36.68 KG/M2

## 2020-12-10 DIAGNOSIS — Z96.652 STATUS POST TOTAL LEFT KNEE REPLACEMENT USING CEMENT: Primary | ICD-10-CM

## 2020-12-10 DIAGNOSIS — Z96.652 STATUS POST TOTAL LEFT KNEE REPLACEMENT USING CEMENT: ICD-10-CM

## 2020-12-10 DIAGNOSIS — M17.0 PRIMARY OSTEOARTHRITIS OF BOTH KNEES: ICD-10-CM

## 2020-12-10 PROCEDURE — 20610 DRAIN/INJ JOINT/BURSA W/O US: CPT | Mod: PBBFAC,PN | Performed by: ORTHOPAEDIC SURGERY

## 2020-12-10 PROCEDURE — 73564 X-RAY EXAM KNEE 4 OR MORE: CPT | Mod: 26,LT,, | Performed by: RADIOLOGY

## 2020-12-10 PROCEDURE — 73562 XR KNEE ORTHO LEFT WITH FLEXION: ICD-10-PCS | Mod: 26,RT,, | Performed by: RADIOLOGY

## 2020-12-10 PROCEDURE — 99213 PR OFFICE/OUTPT VISIT, EST, LEVL III, 20-29 MIN: ICD-10-PCS | Mod: 25,S$PBB,, | Performed by: ORTHOPAEDIC SURGERY

## 2020-12-10 PROCEDURE — 99213 OFFICE O/P EST LOW 20 MIN: CPT | Mod: 25,S$PBB,, | Performed by: ORTHOPAEDIC SURGERY

## 2020-12-10 PROCEDURE — 20610 LARGE JOINT ASPIRATION/INJECTION: R KNEE: ICD-10-PCS | Mod: S$PBB,RT,, | Performed by: ORTHOPAEDIC SURGERY

## 2020-12-10 PROCEDURE — 99999 PR PBB SHADOW E&M-EST. PATIENT-LVL IV: ICD-10-PCS | Mod: PBBFAC,,, | Performed by: ORTHOPAEDIC SURGERY

## 2020-12-10 PROCEDURE — 99999 PR PBB SHADOW E&M-EST. PATIENT-LVL IV: CPT | Mod: PBBFAC,,, | Performed by: ORTHOPAEDIC SURGERY

## 2020-12-10 PROCEDURE — 73564 XR KNEE ORTHO LEFT WITH FLEXION: ICD-10-PCS | Mod: 26,LT,, | Performed by: RADIOLOGY

## 2020-12-10 PROCEDURE — 73562 X-RAY EXAM OF KNEE 3: CPT | Mod: 26,RT,, | Performed by: RADIOLOGY

## 2020-12-10 PROCEDURE — 73562 X-RAY EXAM OF KNEE 3: CPT | Mod: TC,PN,RT

## 2020-12-10 PROCEDURE — 99214 OFFICE O/P EST MOD 30 MIN: CPT | Mod: PBBFAC,25,PN | Performed by: ORTHOPAEDIC SURGERY

## 2020-12-10 RX ORDER — HYDROXYZINE HYDROCHLORIDE 25 MG/1
TABLET, FILM COATED ORAL
COMMUNITY
Start: 2020-09-22

## 2020-12-10 RX ORDER — SIMVASTATIN 10 MG/1
TABLET, FILM COATED ORAL
COMMUNITY
Start: 2020-11-13 | End: 2024-03-28

## 2020-12-10 RX ORDER — EPINEPHRINE 0.3 MG/.3ML
INJECTION INTRAMUSCULAR
COMMUNITY
Start: 2020-11-13

## 2020-12-10 RX ADMIN — Medication 30 MG: at 09:12

## 2020-12-10 NOTE — PROCEDURES
Large Joint Aspiration/Injection: R knee    Date/Time: 12/10/2020 9:45 AM  Performed by: Max Garcia MD  Authorized by: Max Garcia MD     Consent Done?:  Yes (Verbal)  Indications:  Pain  Site marked: the procedure site was marked    Timeout: prior to procedure the correct patient, procedure, and site was verified      Details:  Needle Size:  20 G  Approach:  Anterolateral  Location:  Knee  Site:  R knee  Medications:  30 mg sodium hyaluronate (orthovisc) 30 mg/2 mL  Patient tolerance:  Patient tolerated the procedure well with no immediate complications

## 2020-12-10 NOTE — PROGRESS NOTES
Past Medical History:   Diagnosis Date    Allergy     Arthritis     BPH with obstruction/lower urinary tract symptoms     Environmental allergies     H/O prostatitis     History of urinary retention     [place]hard [place] hearing[place]     WEARS BILATERAL HEARING AIDS    [place]     wears hearing aids    Hypertension     Sleep apnea     Urinary tract infection        Past Surgical History:   Procedure Laterality Date    HERNIA REPAIR      INGUNAL     KNEE ARTHROPLASTY Left 8/25/2020    Procedure: ARTHROPLASTY, KNEE;  Surgeon: Max Garcia MD;  Location: [facility] OR;  Service: Orthopedics;  Laterality: Left;    TRANSRECTAL ULTRASOUND EXAMINATION N/A 8/12/2019    Procedure: TRANSRECTAL ULTRASOUND;  Surgeon: Latia Yoon MD;  Location: [facility] OR;  Service: Urology;  Laterality: N/A;    VASECTOMY      WISDOM TOOTH EXTRACTION         Current Outpatient Medications   Medication Sig    acetaminophen (TYLENOL) 325 MG tablet Take 325 mg by mouth every 6 (six) hours as needed for Pain.    ANDROGEL 20.25 mg/1.25 gram (1.62 %) GlPm     aspirin (ECOTRIN) 81 MG EC tablet Take 81 mg by mouth once daily.    azelastine (ASTELIN) 137 mcg nasal spray 2 sprays (274 mcg total) by Nasal route 2 (two) times daily.    diclofenac sodium (VOLTAREN) 1 % Gel     diphenhydrAMINE (BENADRYL) 50 MG capsule Take 50 mg by mouth every 6 (six) hours as needed for Itching.    EPIPEN 2-KIRIT 0.3 mg/0.3 mL AtIn     fluocinolone (DERMA-SMOOTHE) 0.01 % external oil Apply topically 3 (three) times daily. Apply to damp scalp at bedtime, wash off qam, avoid chronic use.    fluticasone propionate (FLONASE) 50 mcg/actuation nasal spray     hydrOXYzine HCL (ATARAX) 25 MG tablet TK 1 T PO Q 8 H PRN    losartan (COZAAR) 100 MG tablet     montelukast (SINGULAIR) 10 mg tablet TAKE 1 TABLET DAILY    omeprazole (PRILOSEC) 10 MG capsule Take 10 mg by mouth once daily.    simvastatin (ZOCOR) 10 MG tablet     tadalafiL  (CIALIS) 5 MG tablet Take 1 tablet (5 mg) by mouth daily as needed for Erectile Dysfunction.    triamcinolone acetonide 0.1% (KENALOG) 0.1 % cream aaa bid x 2 weeks then prn, avoid chronic use     No current facility-administered medications for this visit.        Review of patient's allergies indicates:   Allergen Reactions    Adhesive     Codeine Hives    Morphine Hives    Sulfa (sulfonamide antibiotics) Hives and Itching       Family History   Problem Relation Age of Onset    Dementia Mother     Cancer Father         skin    Heart disease Father     Allergic rhinitis Neg Hx     Allergies Neg Hx     Angioedema Neg Hx     Asthma Neg Hx     Atopy Neg Hx     Eczema Neg Hx     Immunodeficiency Neg Hx     Rhinitis Neg Hx     Urticaria Neg Hx        Social History     Socioeconomic History    Marital status:      Spouse name: Not on file    Number of children: Not on file    Years of education: Not on file    Highest education level: Not on file   Occupational History    Not on file   Social Needs    Financial resource strain: Not on file    Food insecurity     Worry: Not on file     Inability: Not on file    Transportation needs     Medical: Not on file     Non-medical: Not on file   Tobacco Use    Smoking status: Former Smoker     Packs/day: 1.00     Years: 10.00     Pack years: 10.00     Quit date: 4/3/1974     Years since quittin.7    Smokeless tobacco: Never Used   Substance and Sexual Activity    Alcohol use: No    Drug use: No    Sexual activity: Not on file   Lifestyle    Physical activity     Days per week: Not on file     Minutes per session: Not on file    Stress: Not on file   Relationships    Social connections     Talks on phone: Not on file     Gets together: Not on file     Attends Congregation service: Not on file     Active member of club or organization: Not on file     Attends meetings of clubs or organizations: Not on file     Relationship status: Not on  file   Other Topics Concern    Not on file   Social History Narrative    Not on file       Chief Complaint:   Chief Complaint   Patient presents with    Post-op Evaluation     left knee s/p TKA DOS 08/25/2020    Injections     orthovisc 1/3       Date of surgery:  August 25, 2020    History of present illness: 75-year-old male underwent left total knee arthroplasty 4 months ago.  Patient has done well in regards the actual knee.  Patient has had some issues with itching likel from the Vicryl breaking down.  Patient has moments where he forgets that he had knee surgery.  Right knee is been bothering him for the last few weeks.    Review of Systems:    Musculoskeletal:  See HPI        Physical Examination:    Vital Signs:    Vitals:    12/10/20 0942   Resp: 16       Body mass index is 36.8 kg/m².    This a well-developed, well nourished patient in no acute distress.  They are alert and oriented and cooperative to examination.  Pt. walks without an antalgic gait.      Examination left knee shows healed surgical incision.  No erythema or drainage.  Patient has good range of motion for about 0° to 120°.  Negative anterior drawer exam.  Stable to varus and valgus stress.    Examination of the right knee shows no rashes or erythema. There are no masses ecchymosis or effusion. Patient has full range of motion from 0-130°. Patient is nontender to palpation over lateral joint line and moderately tender to palpation over the medial joint line. Patient has a - Lachman exam, - anterior drawer exam, and - posterior drawer exam. - Juliana's exam. Knee is stable to varus and valgus stress. 5 out of 5 motor strength. Palpable distal pulses. Intact light touch sensation. Negative Patellofemoral crepitus      X-rays:  Four views of the left knee are ordered and reviewed which show well-aligned left total knee arthroplasty components without complication.  Severe medial compartment right knee arthritis     Assessment::  Status  post left Evelia CR total knee arthroplasty  Severe right varus knee arthritis    Plan:  He is doing very well in regards to his left knee.  Patient is here to do his Orthovisc series on the right.  I injected his right knee with Orthovisc 1 of 3.  Follow up next.    This note was created using [employer] voice recognition software that occasionally misinterpreted phrases or words.

## 2020-12-17 ENCOUNTER — OFFICE VISIT (OUTPATIENT)
Dept: ORTHOPEDICS | Facility: CLINIC | Age: 75
End: 2020-12-17
Payer: MEDICARE

## 2020-12-17 VITALS — BODY MASS INDEX: 36.68 KG/M2 | HEIGHT: 68 IN | WEIGHT: 242 LBS | RESPIRATION RATE: 16 BRPM

## 2020-12-17 DIAGNOSIS — M17.0 PRIMARY OSTEOARTHRITIS OF BOTH KNEES: Primary | ICD-10-CM

## 2020-12-17 PROCEDURE — 99499 UNLISTED E&M SERVICE: CPT | Mod: S$PBB,,, | Performed by: ORTHOPAEDIC SURGERY

## 2020-12-17 PROCEDURE — 99499 NO LOS: ICD-10-PCS | Mod: S$PBB,,, | Performed by: ORTHOPAEDIC SURGERY

## 2020-12-17 PROCEDURE — 20610 LARGE JOINT ASPIRATION/INJECTION: R KNEE: ICD-10-PCS | Mod: S$PBB,RT,, | Performed by: ORTHOPAEDIC SURGERY

## 2020-12-17 PROCEDURE — 99999 PR PBB SHADOW E&M-EST. PATIENT-LVL III: CPT | Mod: PBBFAC,,, | Performed by: ORTHOPAEDIC SURGERY

## 2020-12-17 PROCEDURE — 20610 DRAIN/INJ JOINT/BURSA W/O US: CPT | Mod: PBBFAC,PN | Performed by: ORTHOPAEDIC SURGERY

## 2020-12-17 PROCEDURE — 99999 PR PBB SHADOW E&M-EST. PATIENT-LVL III: ICD-10-PCS | Mod: PBBFAC,,, | Performed by: ORTHOPAEDIC SURGERY

## 2020-12-17 RX ADMIN — Medication 30 MG: at 11:12

## 2020-12-17 NOTE — PROCEDURES
Large Joint Aspiration/Injection: R knee    Date/Time: 12/17/2020 11:30 AM  Performed by: Max Garcia MD  Authorized by: Max Garcia MD     Consent Done?:  Yes (Verbal)  Indications:  Pain  Site marked: the procedure site was marked    Timeout: prior to procedure the correct patient, procedure, and site was verified      Details:  Needle Size:  20 G  Approach:  Anterolateral  Location:  Knee  Site:  R knee  Medications:  30 mg sodium hyaluronate (orthovisc) 30 mg/2 mL  Patient tolerance:  Patient tolerated the procedure well with no immediate complications

## 2020-12-17 NOTE — PROGRESS NOTES
Past Medical History:   Diagnosis Date    Allergy     Arthritis     BPH with obstruction/lower urinary tract symptoms     Environmental allergies     H/O prostatitis     History of urinary retention     [place] (hard of hearing)     WEARS BILATERAL HEARING AIDS    [place]hard of hearing[place]     wears hearing aids    Hypertension     Sleep apnea     Urinary tract infection        Past Surgical History:   Procedure Laterality Date    HERNIA REPAIR      INGUNAL     KNEE ARTHROPLASTY Left 8/25/2020    Procedure: ARTHROPLASTY, KNEE;  Surgeon: Max Garcia MD;  Location: [facility] OR;  Service: Orthopedics;  Laterality: Left;    TRANSRECTAL ULTRASOUND EXAMINATION N/A 8/12/2019    Procedure: TRANSRECTAL ULTRASOUND;  Surgeon: Latia Yoon MD;  [facility];  Service: Urology;  Laterality: N/A;    VASECTOMY      WISDOM TOOTH EXTRACTION         Current Outpatient Medications   Medication Sig    acetaminophen (TYLENOL) 325 MG tablet Take 325 mg by mouth every 6 (six) hours as needed for Pain.    ANDROGEL 20.25 mg/1.25 gram (1.62 %) GlPm     aspirin (ECOTRIN) 81 MG EC tablet Take 81 mg by mouth once daily.    azelastine (ASTELIN) 137 mcg nasal spray 2 sprays (274 mcg total) by Nasal route 2 (two) times daily.    diclofenac sodium (VOLTAREN) 1 % Gel     diphenhydrAMINE (BENADRYL) 50 MG capsule Take 50 mg by mouth every 6 (six) hours as needed for Itching.    EPIPEN 2-KIRIT 0.3 mg/0.3 mL AtIn     fluocinolone (DERMA-SMOOTHE) 0.01 % external oil Apply topically 3 (three) times daily. Apply to damp scalp at bedtime, wash off qam, avoid chronic use.    fluticasone propionate (FLONASE) 50 mcg/actuation nasal spray     hydrOXYzine HCL (ATARAX) 25 MG tablet TK 1 T PO Q 8 H PRN    losartan (COZAAR) 100 MG tablet     montelukast (SINGULAIR) 10 mg tablet TAKE 1 TABLET DAILY    omeprazole (PRILOSEC) 10 MG capsule Take 10 mg by mouth once daily.    simvastatin (ZOCOR) 10 MG tablet     tadalafiL  (CIALIS) 5 MG tablet Take 1 tablet (5 mg) by mouth daily as needed for Erectile Dysfunction.    triamcinolone acetonide 0.1% (KENALOG) 0.1 % cream aaa bid x 2 weeks then prn, avoid chronic use     No current facility-administered medications for this visit.        Review of patient's allergies indicates:   Allergen Reactions    Adhesive     Codeine Hives    Morphine Hives    Sulfa (sulfonamide antibiotics) Hives and Itching       Family History   Problem Relation Age of Onset    Dementia Mother     Cancer Father         skin    Heart disease Father     Allergic rhinitis Neg Hx     Allergies Neg Hx     Angioedema Neg Hx     Asthma Neg Hx     Atopy Neg Hx     Eczema Neg Hx     Immunodeficiency Neg Hx     Rhinitis Neg Hx     Urticaria Neg Hx        Social History     Socioeconomic History    Marital status:      Spouse name: Not on file    Number of children: Not on file    Years of education: Not on file    Highest education level: Not on file   Occupational History    Not on file   Social Needs    Financial resource strain: Not on file    Food insecurity     Worry: Not on file     Inability: Not on file    Transportation needs     Medical: Not on file     Non-medical: Not on file   Tobacco Use    Smoking status: Former Smoker     Packs/day: 1.00     Years: 10.00     Pack years: 10.00     Quit date: 4/3/1974     Years since quittin.7    Smokeless tobacco: Never Used   Substance and Sexual Activity    Alcohol use: No    Drug use: No    Sexual activity: Not on file   Lifestyle    Physical activity     Days per week: Not on file     Minutes per session: Not on file    Stress: Not on file   Relationships    Social connections     Talks on phone: Not on file     Gets together: Not on file     Attends M[employer] service: Not on file     Active member of club or organization: Not on file     Attends meetings of clubs or organizations: Not on file     Relationship status: Not on  file   Other Topics Concern    Not on file   Social History Narrative    Not on file       Chief Complaint:   Chief Complaint   Patient presents with    Right Knee - Pain, Injections     orthovisc 2/3        Date of surgery:  August 25, 2020    History of present illness: 75-year-old male underwent left total knee arthroplasty 4 months ago.  Patient has done well in regards the actual knee.  Patient has had some issues with itching likel from the Vicryl breaking down.  Patient has moments where he forgets that he had knee surgery.  Right knee is been bothering him for the last few weeks.    Review of Systems:    Musculoskeletal:  See HPI        Physical Examination:    Vital Signs:    Vitals:    12/17/20 1125   Resp: 16       Body mass index is 36.8 kg/m².    This a well-developed, well nourished patient in no acute distress.  They are alert and oriented and cooperative to examination.  Pt. walks without an antalgic gait.      Examination left knee shows healed surgical incision.  No erythema or drainage.  Patient has good range of motion for about 0° to 120°.  Negative anterior drawer exam.  Stable to varus and valgus stress.    Examination of the right knee shows no rashes or erythema. There are no masses ecchymosis or effusion. Patient has full range of motion from 0-130°. Patient is nontender to palpation over lateral joint line and moderately tender to palpation over the medial joint line. Patient has a - Lachman exam, - anterior drawer exam, and - posterior drawer exam. - Juliana's exam. Knee is stable to varus and valgus stress. 5 out of 5 motor strength. Palpable distal pulses. Intact light touch sensation. Negative Patellofemoral crepitus      X-rays:  Four views of the left knee are ordered and reviewed which show well-aligned left total knee arthroplasty components without complication.  Severe medial compartment right knee arthritis     Assessment::  Status post left Evelia CR total knee  arthroplasty  Severe right varus knee arthritis    Plan:   Patient is here to do his Orthovisc series on the right.  I injected his right knee with Orthovisc 2 of 3.  Follow up next week.    This note was created using [employer] voice recognition software that occasionally misinterpreted phrases or words.

## 2020-12-21 ENCOUNTER — PATIENT MESSAGE (OUTPATIENT)
Dept: ORTHOPEDICS | Facility: CLINIC | Age: 75
End: 2020-12-21

## 2020-12-27 ENCOUNTER — PATIENT MESSAGE (OUTPATIENT)
Dept: ORTHOPEDICS | Facility: CLINIC | Age: 75
End: 2020-12-27

## 2020-12-29 ENCOUNTER — PATIENT MESSAGE (OUTPATIENT)
Dept: UROLOGY | Facility: CLINIC | Age: 75
End: 2020-12-29

## 2021-01-04 ENCOUNTER — TELEPHONE (OUTPATIENT)
Dept: ORTHOPEDICS | Facility: CLINIC | Age: 76
End: 2021-01-04

## 2021-01-07 ENCOUNTER — OFFICE VISIT (OUTPATIENT)
Dept: ORTHOPEDICS | Facility: CLINIC | Age: 76
End: 2021-01-07
Payer: MEDICARE

## 2021-01-07 VITALS — WEIGHT: 242 LBS | BODY MASS INDEX: 36.68 KG/M2 | HEIGHT: 68 IN | RESPIRATION RATE: 16 BRPM

## 2021-01-07 DIAGNOSIS — M17.0 PRIMARY OSTEOARTHRITIS OF BOTH KNEES: Primary | ICD-10-CM

## 2021-01-07 PROCEDURE — 99999 PR PBB SHADOW E&M-EST. PATIENT-LVL III: ICD-10-PCS | Mod: PBBFAC,,, | Performed by: ORTHOPAEDIC SURGERY

## 2021-01-07 PROCEDURE — 99499 NO LOS: ICD-10-PCS | Mod: S$PBB,,, | Performed by: ORTHOPAEDIC SURGERY

## 2021-01-07 PROCEDURE — 20610 DRAIN/INJ JOINT/BURSA W/O US: CPT | Mod: PBBFAC,PN | Performed by: ORTHOPAEDIC SURGERY

## 2021-01-07 PROCEDURE — 99999 PR PBB SHADOW E&M-EST. PATIENT-LVL III: CPT | Mod: PBBFAC,,, | Performed by: ORTHOPAEDIC SURGERY

## 2021-01-07 PROCEDURE — 20610 LARGE JOINT ASPIRATION/INJECTION: R KNEE: ICD-10-PCS | Mod: S$PBB,RT,, | Performed by: ORTHOPAEDIC SURGERY

## 2021-01-07 PROCEDURE — 99499 UNLISTED E&M SERVICE: CPT | Mod: S$PBB,,, | Performed by: ORTHOPAEDIC SURGERY

## 2021-01-07 RX ADMIN — Medication 30 MG: at 04:01

## 2021-03-23 ENCOUNTER — OFFICE VISIT (OUTPATIENT)
Dept: UROLOGY | Facility: CLINIC | Age: 76
End: 2021-03-23
Payer: MEDICARE

## 2021-03-23 VITALS
WEIGHT: 234.56 LBS | HEART RATE: 83 BPM | BODY MASS INDEX: 35.55 KG/M2 | SYSTOLIC BLOOD PRESSURE: 114 MMHG | DIASTOLIC BLOOD PRESSURE: 74 MMHG | HEIGHT: 68 IN

## 2021-03-23 DIAGNOSIS — R33.9 URINARY RETENTION: ICD-10-CM

## 2021-03-23 DIAGNOSIS — N40.0 BENIGN PROSTATIC HYPERPLASIA, UNSPECIFIED WHETHER LOWER URINARY TRACT SYMPTOMS PRESENT: Primary | ICD-10-CM

## 2021-03-23 LAB
BILIRUB SERPL-MCNC: NORMAL MG/DL
BLOOD URINE, POC: NORMAL
CLARITY, POC UA: CLEAR
COLOR, POC UA: YELLOW
GLUCOSE UR QL STRIP: NORMAL
KETONES UR QL STRIP: NORMAL
LEUKOCYTE ESTERASE URINE, POC: NORMAL
NITRITE, POC UA: NORMAL
PH, POC UA: 6
POC RESIDUAL URINE VOLUME: 23 ML (ref 0–100)
PROTEIN, POC: NORMAL
SPECIFIC GRAVITY, POC UA: 1.01
UROBILINOGEN, POC UA: NORMAL

## 2021-03-23 PROCEDURE — 99214 OFFICE O/P EST MOD 30 MIN: CPT | Mod: PBBFAC,PN | Performed by: UROLOGY

## 2021-03-23 PROCEDURE — 99214 OFFICE O/P EST MOD 30 MIN: CPT | Mod: S$PBB,,, | Performed by: UROLOGY

## 2021-03-23 PROCEDURE — 99214 PR OFFICE/OUTPT VISIT, EST, LEVL IV, 30-39 MIN: ICD-10-PCS | Mod: S$PBB,,, | Performed by: UROLOGY

## 2021-03-23 PROCEDURE — 81002 URINALYSIS NONAUTO W/O SCOPE: CPT | Mod: PBBFAC,PN | Performed by: UROLOGY

## 2021-03-23 PROCEDURE — 99999 PR PBB SHADOW E&M-EST. PATIENT-LVL IV: ICD-10-PCS | Mod: PBBFAC,,, | Performed by: UROLOGY

## 2021-03-23 PROCEDURE — 99999 PR PBB SHADOW E&M-EST. PATIENT-LVL IV: CPT | Mod: PBBFAC,,, | Performed by: UROLOGY

## 2021-03-23 PROCEDURE — 51798 US URINE CAPACITY MEASURE: CPT | Mod: PBBFAC,PN | Performed by: UROLOGY

## 2021-03-23 RX ORDER — TADALAFIL 5 MG/1
5 TABLET ORAL DAILY
Qty: 90 TABLET | Refills: 3 | Status: SHIPPED | OUTPATIENT
Start: 2021-03-23 | End: 2022-03-29

## 2021-03-23 RX ORDER — CEPHALEXIN 500 MG/1
CAPSULE ORAL
COMMUNITY
Start: 2020-12-26 | End: 2021-03-23

## 2021-03-23 RX ORDER — PHENAZOPYRIDINE HYDROCHLORIDE 200 MG/1
TABLET, FILM COATED ORAL
COMMUNITY
Start: 2020-12-20 | End: 2021-03-23 | Stop reason: ALTCHOICE

## 2021-03-29 ENCOUNTER — PATIENT MESSAGE (OUTPATIENT)
Dept: UROLOGY | Facility: CLINIC | Age: 76
End: 2021-03-29

## 2021-06-11 ENCOUNTER — PATIENT MESSAGE (OUTPATIENT)
Dept: UROLOGY | Facility: CLINIC | Age: 76
End: 2021-06-11

## 2021-07-21 ENCOUNTER — TELEPHONE (OUTPATIENT)
Dept: UROLOGY | Facility: CLINIC | Age: 76
End: 2021-07-21

## 2021-07-26 ENCOUNTER — PATIENT MESSAGE (OUTPATIENT)
Dept: ORTHOPEDICS | Facility: CLINIC | Age: 76
End: 2021-07-26

## 2021-08-02 DIAGNOSIS — M17.0 PRIMARY OSTEOARTHRITIS OF BOTH KNEES: Primary | ICD-10-CM

## 2021-08-23 ENCOUNTER — OFFICE VISIT (OUTPATIENT)
Dept: ORTHOPEDICS | Facility: CLINIC | Age: 76
End: 2021-08-23
Payer: MEDICARE

## 2021-08-23 VITALS — RESPIRATION RATE: 18 BRPM | BODY MASS INDEX: 35.46 KG/M2 | HEIGHT: 68 IN | WEIGHT: 234 LBS

## 2021-08-23 DIAGNOSIS — M17.0 PRIMARY OSTEOARTHRITIS OF BOTH KNEES: ICD-10-CM

## 2021-08-23 DIAGNOSIS — M18.12 ARTHRITIS OF CARPOMETACARPAL (CMC) JOINT OF LEFT THUMB: Primary | ICD-10-CM

## 2021-08-23 PROCEDURE — 20610 DRAIN/INJ JOINT/BURSA W/O US: CPT | Mod: S$PBB,RT,, | Performed by: ORTHOPAEDIC SURGERY

## 2021-08-23 PROCEDURE — 99999 PR PBB SHADOW E&M-EST. PATIENT-LVL III: CPT | Mod: PBBFAC,,, | Performed by: ORTHOPAEDIC SURGERY

## 2021-08-23 PROCEDURE — 99213 PR OFFICE/OUTPT VISIT, EST, LEVL III, 20-29 MIN: ICD-10-PCS | Mod: 25,S$PBB,, | Performed by: ORTHOPAEDIC SURGERY

## 2021-08-23 PROCEDURE — 99999 PR PBB SHADOW E&M-EST. PATIENT-LVL III: ICD-10-PCS | Mod: PBBFAC,,, | Performed by: ORTHOPAEDIC SURGERY

## 2021-08-23 PROCEDURE — 20610 PR DRAIN/INJECT LARGE JOINT/BURSA: ICD-10-PCS | Mod: S$PBB,RT,, | Performed by: ORTHOPAEDIC SURGERY

## 2021-08-23 PROCEDURE — 20600 SMALL JOINT ASPIRATION/INJECTION: L THUMB CMC: ICD-10-PCS | Mod: S$PBB,59,LT, | Performed by: ORTHOPAEDIC SURGERY

## 2021-08-23 PROCEDURE — 20600 DRAIN/INJ JOINT/BURSA W/O US: CPT | Mod: PBBFAC,PN,LT | Performed by: ORTHOPAEDIC SURGERY

## 2021-08-23 PROCEDURE — 20610 DRAIN/INJ JOINT/BURSA W/O US: CPT | Mod: PBBFAC,RT | Performed by: ORTHOPAEDIC SURGERY

## 2021-08-23 PROCEDURE — 99213 OFFICE O/P EST LOW 20 MIN: CPT | Mod: 25,S$PBB,, | Performed by: ORTHOPAEDIC SURGERY

## 2021-08-23 RX ORDER — TRIAMCINOLONE ACETONIDE 40 MG/ML
40 INJECTION, SUSPENSION INTRA-ARTICULAR; INTRAMUSCULAR
Status: DISCONTINUED | OUTPATIENT
Start: 2021-08-23 | End: 2021-08-23 | Stop reason: HOSPADM

## 2021-08-23 RX ADMIN — TRIAMCINOLONE ACETONIDE 40 MG: 40 INJECTION, SUSPENSION INTRA-ARTICULAR; INTRAMUSCULAR at 08:08

## 2021-08-23 RX ADMIN — Medication 30 MG: at 08:08

## 2021-08-27 ENCOUNTER — PATIENT MESSAGE (OUTPATIENT)
Dept: ORTHOPEDICS | Facility: CLINIC | Age: 76
End: 2021-08-27

## 2021-09-09 ENCOUNTER — OFFICE VISIT (OUTPATIENT)
Dept: ORTHOPEDICS | Facility: CLINIC | Age: 76
End: 2021-09-09
Payer: MEDICARE

## 2021-09-09 DIAGNOSIS — M17.0 PRIMARY OSTEOARTHRITIS OF BOTH KNEES: Primary | ICD-10-CM

## 2021-09-09 PROCEDURE — 20610 PR DRAIN/INJECT LARGE JOINT/BURSA: ICD-10-PCS | Mod: S$PBB,RT,, | Performed by: ORTHOPAEDIC SURGERY

## 2021-09-09 PROCEDURE — 99499 NO LOS: ICD-10-PCS | Mod: S$PBB,,, | Performed by: ORTHOPAEDIC SURGERY

## 2021-09-09 PROCEDURE — 99499 UNLISTED E&M SERVICE: CPT | Mod: S$PBB,,, | Performed by: ORTHOPAEDIC SURGERY

## 2021-09-09 PROCEDURE — 99211 OFF/OP EST MAY X REQ PHY/QHP: CPT | Mod: PBBFAC,PN | Performed by: ORTHOPAEDIC SURGERY

## 2021-09-09 PROCEDURE — 20610 DRAIN/INJ JOINT/BURSA W/O US: CPT | Mod: PBBFAC,PN,RT | Performed by: ORTHOPAEDIC SURGERY

## 2021-09-09 PROCEDURE — 99999 PR PBB SHADOW E&M-EST. PATIENT-LVL I: ICD-10-PCS | Mod: PBBFAC,,, | Performed by: ORTHOPAEDIC SURGERY

## 2021-09-09 PROCEDURE — 99999 PR PBB SHADOW E&M-EST. PATIENT-LVL I: CPT | Mod: PBBFAC,,, | Performed by: ORTHOPAEDIC SURGERY

## 2021-09-09 PROCEDURE — 20610 DRAIN/INJ JOINT/BURSA W/O US: CPT | Mod: S$PBB,RT,, | Performed by: ORTHOPAEDIC SURGERY

## 2021-09-09 RX ADMIN — Medication 30 MG: at 08:09

## 2021-09-13 ENCOUNTER — OFFICE VISIT (OUTPATIENT)
Dept: ORTHOPEDICS | Facility: CLINIC | Age: 76
End: 2021-09-13
Payer: MEDICARE

## 2021-09-13 DIAGNOSIS — M17.0 PRIMARY OSTEOARTHRITIS OF BOTH KNEES: Primary | ICD-10-CM

## 2021-09-13 PROCEDURE — 99211 OFF/OP EST MAY X REQ PHY/QHP: CPT | Mod: PBBFAC,PN,25 | Performed by: ORTHOPAEDIC SURGERY

## 2021-09-13 PROCEDURE — 99499 NO LOS: ICD-10-PCS | Mod: S$PBB,,, | Performed by: ORTHOPAEDIC SURGERY

## 2021-09-13 PROCEDURE — 20610 DRAIN/INJ JOINT/BURSA W/O US: CPT | Mod: PBBFAC,PN | Performed by: ORTHOPAEDIC SURGERY

## 2021-09-13 PROCEDURE — 99499 UNLISTED E&M SERVICE: CPT | Mod: S$PBB,,, | Performed by: ORTHOPAEDIC SURGERY

## 2021-09-13 PROCEDURE — 99999 PR PBB SHADOW E&M-EST. PATIENT-LVL I: ICD-10-PCS | Mod: PBBFAC,,, | Performed by: ORTHOPAEDIC SURGERY

## 2021-09-13 PROCEDURE — 20610 DRAIN/INJ JOINT/BURSA W/O US: CPT | Mod: S$PBB,RT,, | Performed by: ORTHOPAEDIC SURGERY

## 2021-09-13 PROCEDURE — 99999 PR PBB SHADOW E&M-EST. PATIENT-LVL I: CPT | Mod: PBBFAC,,, | Performed by: ORTHOPAEDIC SURGERY

## 2021-09-13 PROCEDURE — 20610 PR DRAIN/INJECT LARGE JOINT/BURSA: ICD-10-PCS | Mod: S$PBB,RT,, | Performed by: ORTHOPAEDIC SURGERY

## 2021-09-13 RX ADMIN — Medication 30 MG: at 09:09

## 2022-02-23 ENCOUNTER — TELEPHONE (OUTPATIENT)
Dept: ORTHOPEDICS | Facility: CLINIC | Age: 77
End: 2022-02-23
Payer: MEDICARE

## 2022-02-23 DIAGNOSIS — M17.0 PRIMARY OSTEOARTHRITIS OF BOTH KNEES: Primary | ICD-10-CM

## 2022-02-23 NOTE — TELEPHONE ENCOUNTER
----- Message from Lucrecia Rodrigez MA sent at 2/23/2022  1:42 PM CST -----  Contact: pt  Needs to auth and schedule orthovisc   Call back

## 2022-03-14 ENCOUNTER — OFFICE VISIT (OUTPATIENT)
Dept: ORTHOPEDICS | Facility: CLINIC | Age: 77
End: 2022-03-14
Payer: MEDICARE

## 2022-03-14 VITALS — RESPIRATION RATE: 18 BRPM | BODY MASS INDEX: 35.46 KG/M2 | HEIGHT: 68 IN | WEIGHT: 234 LBS

## 2022-03-14 DIAGNOSIS — M17.0 PRIMARY OSTEOARTHRITIS OF BOTH KNEES: Primary | ICD-10-CM

## 2022-03-14 PROBLEM — M17.12 OSTEOARTHRITIS OF LEFT KNEE: Status: RESOLVED | Noted: 2020-05-06 | Resolved: 2022-03-14

## 2022-03-14 PROCEDURE — 99499 UNLISTED E&M SERVICE: CPT | Mod: S$PBB,,, | Performed by: ORTHOPAEDIC SURGERY

## 2022-03-14 PROCEDURE — 20610 DRAIN/INJ JOINT/BURSA W/O US: CPT | Mod: PBBFAC | Performed by: ORTHOPAEDIC SURGERY

## 2022-03-14 PROCEDURE — 99499 NO LOS: ICD-10-PCS | Mod: S$PBB,,, | Performed by: ORTHOPAEDIC SURGERY

## 2022-03-14 PROCEDURE — 99999 PR PBB SHADOW E&M-EST. PATIENT-LVL III: ICD-10-PCS | Mod: PBBFAC,,, | Performed by: ORTHOPAEDIC SURGERY

## 2022-03-14 PROCEDURE — 99999 PR PBB SHADOW E&M-EST. PATIENT-LVL III: CPT | Mod: PBBFAC,,, | Performed by: ORTHOPAEDIC SURGERY

## 2022-03-14 RX ADMIN — Medication 30 MG: at 11:03

## 2022-03-14 NOTE — PROCEDURES
Large Joint Aspiration/Injection: R knee joint    Date/Time: 3/14/2022 11:30 AM  Performed by: Max Garcia MD  Authorized by: Max Garcia MD     Consent Done?:  Yes (Verbal)  Indications:  Pain  Site marked: the procedure site was marked    Timeout: prior to procedure the correct patient, procedure, and site was verified      Details:  Needle Size:  20 G  Approach:  Anterolateral  Location:  Knee  Site:  R knee joint  Medications:  30 mg sodium hyaluronate (orthovisc) 30 mg/2 mL  Patient tolerance:  Patient tolerated the procedure well with no immediate complications

## 2022-03-14 NOTE — PROGRESS NOTES
Past Medical History:   Diagnosis Date    Allergy     Arthritis     BPH with obstruction/lower urinary tract symptoms     Environmental allergies     H/O prostatitis     History of urinary retention     [place]hard [place] hearing[place]     WEARS BILATERAL HEARING AIDS    [place]     wears hearing aids    Hypertension     Sleep apnea     Urinary tract infection        Past Surgical History:   Procedure Laterality Date    HERNIA REPAIR      INGUNAL     KNEE ARTHROPLASTY Left 8/25/2020    Procedure: ARTHROPLASTY, KNEE;  Surgeon: Max Garcia MD;  Location: [facility] OR;  Service: Orthopedics;  Laterality: Left;    TRANSRECTAL ULTRASOUND EXAMINATION N/A 8/12/2019    Procedure: TRANSRECTAL ULTRASOUND;  Surgeon: Latia Yoon MD;  Location: [facility] OR;  Service: Urology;  Laterality: N/A;    VASECTOMY      WISDOM TOOTH EXTRACTION         Current Outpatient Medications   Medication Sig    acetaminophen (TYLENOL) 325 MG tablet Take 325 mg by mouth every 6 (six) hours as needed for Pain.    ANDROGEL 20.25 mg/1.25 gram (1.62 %) GlPm     aspirin (ECOTRIN) 81 MG EC tablet Take 81 mg by mouth once daily.    azelastine (ASTELIN) 137 mcg nasal spray 2 sprays (274 mcg total) by Nasal route 2 (two) times daily.    diclofenac sodium (VOLTAREN) 1 % Gel     diphenhydrAMINE (BENADRYL) 50 MG capsule Take 50 mg by mouth every 6 (six) hours as needed for Itching.    EPIPEN 2-KIRIT 0.3 mg/0.3 mL AtIn     fluocinolone (DERMA-SMOOTHE) 0.01 % external oil Apply topically 3 (three) times daily. Apply to damp scalp at bedtime, wash off qam, avoid chronic use.    fluticasone propionate (FLONASE) 50 mcg/actuation nasal spray     hydrOXYzine HCL (ATARAX) 25 MG tablet TK 1 T PO Q 8 H PRN    losartan (COZAAR) 100 MG tablet     montelukast (SINGULAIR) 10 mg tablet TAKE 1 TABLET DAILY    omeprazole (PRILOSEC) 10 MG capsule Take 10 mg by mouth once daily.    simvastatin (ZOCOR) 10 MG tablet     tadalafiL  (CIALIS) 5 MG tablet Take 1 tablet (5 mg total) by mouth once daily.    triamcinolone acetonide 0.1% (KENALOG) 0.1 % cream aaa bid x 2 weeks then prn, avoid chronic use     No current facility-administered medications for this visit.       Review of patient's allergies indicates:   Allergen Reactions    Adhesive     Codeine Hives    Morphine Hives    Sulfa (sulfonamide antibiotics) Hives and Itching       Family History   Problem Relation Age of Onset    Dementia Mother     Cancer Father         skin    Heart disease Father     Allergic rhinitis Neg Hx     Allergies Neg Hx     Angioedema Neg Hx     Asthma Neg Hx     Atopy Neg Hx     Eczema Neg Hx     Immunodeficiency Neg Hx     Rhinitis Neg Hx     Urticaria Neg Hx        Social History     Socioeconomic History    Marital status:    Tobacco Use    Smoking status: Former Smoker     Packs/day: 1.00     Years: 10.00     Pack years: 10.00     Quit date: 4/3/1974     Years since quittin.9    Smokeless tobacco: Never Used   Substance and Sexual Activity    Alcohol use: No    Drug use: No       Chief Complaint:   Chief Complaint   Patient presents with    Right Knee - Injections       Date of surgery:  2020    History of present illness: 75-year-old male underwent left total knee arthroplasty 4 months ago.  Patient has done well in regards the actual knee.  Patient has had some issues with itching likely from the Vicryl breaking down.  Patient has moments where he forgets that he had knee surgery.  Right knee is been bothering him for the last few weeks.  Been hurting for about a month or so now.    Review of Systems:    Musculoskeletal:  See HPI        Physical Examination:    Vital Signs:    Vitals:    22 1134   Resp: 18       Body mass index is 35.58 kg/m².    This a well-developed, well nourished patient in no acute distress.  They are alert and oriented and cooperative to examination.  Pt. walks without an antalgic  gait.      Examination left knee shows healed surgical incision.  No erythema or drainage.  Patient has good range of motion for about 0° to 120°.  Negative anterior drawer exam.  Stable to varus and valgus stress.    Examination of the right knee shows no rashes or erythema. There are no masses ecchymosis or effusion. Patient has full range of motion from 0-130°. Patient is nontender to palpation over lateral joint line and moderately tender to palpation over the medial joint line. Patient has a - Lachman exam, - anterior drawer exam, and - posterior drawer exam. - Juliana's exam. Knee is stable to varus and valgus stress. 5 out of 5 motor strength. Palpable distal pulses. Intact light touch sensation. Negative Patellofemoral crepitus    X-rays:  Four views of the left knee are  reviewed which show well-aligned left total knee arthroplasty components without complication.  Severe medial compartment right knee arthritis     Assessment::  Status post left Evelia CR total knee arthroplasty  Severe right varus knee arthritis      Plan:    I injected his right knee with Orthovisc 1 of 3.  Follow up in 1 week.    This note was created using [employer] voice recognition software that occasionally misinterpreted phrases or words.

## 2022-03-21 ENCOUNTER — OFFICE VISIT (OUTPATIENT)
Dept: ORTHOPEDICS | Facility: CLINIC | Age: 77
End: 2022-03-21
Payer: MEDICARE

## 2022-03-21 VITALS — HEIGHT: 68 IN | WEIGHT: 234 LBS | RESPIRATION RATE: 18 BRPM | BODY MASS INDEX: 35.46 KG/M2

## 2022-03-21 DIAGNOSIS — M17.0 PRIMARY OSTEOARTHRITIS OF BOTH KNEES: Primary | ICD-10-CM

## 2022-03-21 DIAGNOSIS — M18.12 ARTHRITIS OF CARPOMETACARPAL (CMC) JOINT OF LEFT THUMB: ICD-10-CM

## 2022-03-21 PROCEDURE — 20610 DRAIN/INJ JOINT/BURSA W/O US: CPT | Mod: S$PBB,RT,, | Performed by: ORTHOPAEDIC SURGERY

## 2022-03-21 PROCEDURE — 99499 NO LOS: ICD-10-PCS | Mod: S$PBB,,, | Performed by: ORTHOPAEDIC SURGERY

## 2022-03-21 PROCEDURE — 20600 SMALL JOINT ASPIRATION/INJECTION: L THUMB CMC: ICD-10-PCS | Mod: S$PBB,51,LT, | Performed by: ORTHOPAEDIC SURGERY

## 2022-03-21 PROCEDURE — 20610 PR DRAIN/INJECT LARGE JOINT/BURSA: ICD-10-PCS | Mod: S$PBB,RT,, | Performed by: ORTHOPAEDIC SURGERY

## 2022-03-21 PROCEDURE — 20600 DRAIN/INJ JOINT/BURSA W/O US: CPT | Mod: PBBFAC,PN,LT | Performed by: ORTHOPAEDIC SURGERY

## 2022-03-21 PROCEDURE — 99499 UNLISTED E&M SERVICE: CPT | Mod: S$PBB,,, | Performed by: ORTHOPAEDIC SURGERY

## 2022-03-21 PROCEDURE — 99999 PR PBB SHADOW E&M-EST. PATIENT-LVL III: ICD-10-PCS | Mod: PBBFAC,,, | Performed by: ORTHOPAEDIC SURGERY

## 2022-03-21 PROCEDURE — 99213 OFFICE O/P EST LOW 20 MIN: CPT | Mod: PBBFAC,PN,25 | Performed by: ORTHOPAEDIC SURGERY

## 2022-03-21 PROCEDURE — 99999 PR PBB SHADOW E&M-EST. PATIENT-LVL III: CPT | Mod: PBBFAC,,, | Performed by: ORTHOPAEDIC SURGERY

## 2022-03-21 RX ORDER — TRIAMCINOLONE ACETONIDE 40 MG/ML
40 INJECTION, SUSPENSION INTRA-ARTICULAR; INTRAMUSCULAR
Status: DISCONTINUED | OUTPATIENT
Start: 2022-03-21 | End: 2022-03-21 | Stop reason: HOSPADM

## 2022-03-21 RX ADMIN — TRIAMCINOLONE ACETONIDE 40 MG: 40 INJECTION, SUSPENSION INTRA-ARTICULAR; INTRAMUSCULAR at 11:03

## 2022-03-21 RX ADMIN — Medication 30 MG: at 11:03

## 2022-03-21 NOTE — PROGRESS NOTES
Past Medical History:   Diagnosis Date    Allergy     Arthritis     BPH with obstruction/lower urinary tract symptoms     Environmental allergies     H/O prostatitis     History of urinary retention     [place] (hard of hearing)     WEARS BILATERAL HEARING AIDS    [place] (hard of hearing)     wears hearing aids    Hypertension     Sleep apnea     Urinary tract infection        Past Surgical History:   Procedure Laterality Date    HERNIA REPAIR      INGUNAL     KNEE ARTHROPLASTY Left 8/25/2020    Procedure: ARTHROPLASTY, KNEE;  Surgeon: Max Garcia MD;  Location: [facility] OR;  Service: Orthopedics;  Laterality: Left;    TRANSRECTAL ULTRASOUND EXAMINATION N/A 8/12/2019    Procedure: TRANSRECTAL ULTRASOUND;  Surgeon: Latia Yoon MD;  Location: [facility] OR;  Service: Urology;  Laterality: N/A;    VASECTOMY      WISDOM TOOTH EXTRACTION         Current Outpatient Medications   Medication Sig    acetaminophen (TYLENOL) 325 MG tablet Take 325 mg by mouth every 6 (six) hours as needed for Pain.    ANDROGEL 20.25 mg/1.25 gram (1.62 %) GlPm     aspirin (ECOTRIN) 81 MG EC tablet Take 81 mg by mouth once daily.    azelastine (ASTELIN) 137 mcg nasal spray 2 sprays (274 mcg total) by Nasal route 2 (two) times daily.    diclofenac sodium (VOLTAREN) 1 % Gel     diphenhydrAMINE (BENADRYL) 50 MG capsule Take 50 mg by mouth every 6 (six) hours as needed for Itching.    EPIPEN 2-KIRIT 0.3 mg/0.3 mL AtIn     fluocinolone (DERMA-SMOOTHE) 0.01 % external oil Apply topically 3 (three) times daily. Apply to damp scalp at bedtime, wash off qam, avoid chronic use.    fluticasone propionate (FLONASE) 50 mcg/actuation nasal spray     hydrOXYzine HCL (ATARAX) 25 MG tablet TK 1 T PO Q 8 H PRN    losartan (COZAAR) 100 MG tablet     montelukast (SINGULAIR) 10 mg tablet TAKE 1 TABLET DAILY    omeprazole (PRILOSEC) 10 MG capsule Take 10 mg by mouth once daily.    simvastatin (ZOCOR) 10 MG tablet     tadalafiL  (CIALIS) 5 MG tablet Take 1 tablet (5 mg total) by mouth once daily.    triamcinolone acetonide 0.1% (KENALOG) 0.1 % cream aaa bid x 2 weeks then prn, avoid chronic use     No current facility-administered medications for this visit.       Review of patient's allergies indicates:   Allergen Reactions    Adhesive     Codeine Hives    Morphine Hives    Sulfa (sulfonamide antibiotics) Hives and Itching       Family History   Problem Relation Age of Onset    Dementia Mother     Cancer Father         skin    Heart disease Father     Allergic rhinitis Neg Hx     Allergies Neg Hx     Angioedema Neg Hx     Asthma Neg Hx     Atopy Neg Hx     Eczema Neg Hx     Immunodeficiency Neg Hx     Rhinitis Neg Hx     Urticaria Neg Hx        Social History     Socioeconomic History    Marital status:    Tobacco Use    Smoking status: Former Smoker     Packs/day: 1.00     Years: 10.00     Pack years: 10.00     Quit date: 4/3/1974     Years since quittin.9    Smokeless tobacco: Never Used   Substance and Sexual Activity    Alcohol use: No    Drug use: No       Chief Complaint:   No chief complaint on file.      Date of surgery:  2020    History of present illness: 76-year-old male underwent left total knee arthroplasty.  Right knee is been bothering him for the last few weeks.  Also having more pain in the left base of the thumb.  Been hurting for about a month or so now.    Review of Systems:    Musculoskeletal:  See HPI        Physical Examination:    Vital Signs:    There were no vitals filed for this visit.    There is no height or weight on file to calculate BMI.    This a well-developed, well nourished patient in no acute distress.  They are alert and oriented and cooperative to examination.  Pt. walks without an antalgic gait.      Examination of the right knee shows no rashes or erythema. There are no masses ecchymosis or effusion. Patient has full range of motion from 0-130°. Patient  is nontender to palpation over lateral joint line and moderately tender to palpation over the medial joint line. Patient has a - Lachman exam, - anterior drawer exam, and - posterior drawer exam. - Juliana's exam. Knee is stable to varus and valgus stress. 5 out of 5 motor strength. Palpable distal pulses. Intact light touch sensation. Negative Patellofemoral crepitus    Examination of the left hand and wrist shows no signs of rashes or erythema. Patient has no masses ecchymosis or effusions. Patient has full range of motion of the wrist in flexion and extension as well as ulnar and radial deviation. The patient also has full range of motion of all joints in the hand. There are 2+ radial pulse and intact light touch sensation in all 5 digits. Nontender over the anatomic snuffbox.  Pain over the CMC joint        X-rays:  Four views of the left knee are  reviewed which show well-aligned left total knee arthroplasty components without complication.  Severe medial compartment right knee arthritis     Assessment::  Status post left Evelia CR total knee arthroplasty  Severe right varus knee arthritis  Left CMC arthritis    Plan:   Patient is here to do his Orthovisc series on the right.  I injected his right knee with Orthovisc 2 of 3. We also talked about treatment for his likely CMC arthritis.  I agreed to inject the CMC joint as well today.  Follow up next week for continued Orthovisc.    This note was created using [employer] voice recognition software that occasionally misinterpreted phrases or words.

## 2022-03-21 NOTE — PROCEDURES
Large Joint Aspiration/Injection: R knee joint    Date/Time: 3/21/2022 11:30 AM  Performed by: Max Garcia MD  Authorized by: Max Garcia MD     Consent Done?:  Yes (Verbal)  Indications:  Pain  Site marked: the procedure site was marked    Timeout: prior to procedure the correct patient, procedure, and site was verified      Details:  Needle Size:  20 G  Approach:  Anterolateral  Location:  Knee  Site:  R knee joint  Medications:  30 mg sodium hyaluronate (orthovisc) 30 mg/2 mL  Patient tolerance:  Patient tolerated the procedure well with no immediate complications

## 2022-03-21 NOTE — PROCEDURES
Small Joint Aspiration/Injection: L thumb CMC    Date/Time: 3/21/2022 11:30 AM  Performed by: Max Garcia MD  Authorized by: Max Garcia MD     Consent Done?:  Yes (Verbal)  Indications:  Pain  Site marked: the procedure site was marked    Timeout: prior to procedure the correct patient, procedure, and site was verified    Prep: patient was prepped and draped in usual sterile fashion      Local anesthesia used?: Yes    Anesthesia:  Local infiltration  Local anesthetic:  Lidocaine 1% without epinephrine and bupivacaine 0.25% without epinephrine  Anesthetic total (ml):  2    Location:  Thumb  Site:  L thumb CMC  Needle size:  22 G  Medications:  40 mg triamcinolone acetonide 40 mg/mL  Patient tolerance:  Patient tolerated the procedure well with no immediate complications

## 2022-03-28 ENCOUNTER — OFFICE VISIT (OUTPATIENT)
Dept: ORTHOPEDICS | Facility: CLINIC | Age: 77
End: 2022-03-28
Payer: MEDICARE

## 2022-03-28 VITALS — BODY MASS INDEX: 35.46 KG/M2 | RESPIRATION RATE: 18 BRPM | WEIGHT: 234 LBS | HEIGHT: 68 IN

## 2022-03-28 DIAGNOSIS — M17.0 PRIMARY OSTEOARTHRITIS OF BOTH KNEES: Primary | ICD-10-CM

## 2022-03-28 PROCEDURE — 99499 NO LOS: ICD-10-PCS | Mod: S$PBB,,, | Performed by: ORTHOPAEDIC SURGERY

## 2022-03-28 PROCEDURE — 99999 PR PBB SHADOW E&M-EST. PATIENT-LVL III: CPT | Mod: PBBFAC,,, | Performed by: ORTHOPAEDIC SURGERY

## 2022-03-28 PROCEDURE — 99213 OFFICE O/P EST LOW 20 MIN: CPT | Mod: PBBFAC,PN | Performed by: ORTHOPAEDIC SURGERY

## 2022-03-28 PROCEDURE — 99999 PR PBB SHADOW E&M-EST. PATIENT-LVL III: ICD-10-PCS | Mod: PBBFAC,,, | Performed by: ORTHOPAEDIC SURGERY

## 2022-03-28 PROCEDURE — 99499 UNLISTED E&M SERVICE: CPT | Mod: S$PBB,,, | Performed by: ORTHOPAEDIC SURGERY

## 2022-03-28 RX ADMIN — Medication 30 MG: at 04:03

## 2022-03-28 NOTE — PROGRESS NOTES
Past Medical History:   Diagnosis Date    Allergy     Arthritis     BPH with obstruction/lower urinary tract symptoms     Environmental allergies     H/O prostatitis     History of urinary retention     [place] (hard of hearing)     WEARS BILATERAL HEARING AIDS    [place] (hard of hearing)     wears hearing aids    Hypertension     Sleep apnea     Urinary tract infection        Past Surgical History:   Procedure Laterality Date    HERNIA REPAIR      INGUNAL     KNEE ARTHROPLASTY Left 8/25/2020    Procedure: ARTHROPLASTY, KNEE;  Surgeon: Max Garcia MD;  Location: [facility] OR;  Service: Orthopedics;  Laterality: Left;    TRANSRECTAL ULTRASOUND EXAMINATION N/A 8/12/2019    Procedure: TRANSRECTAL ULTRASOUND;  Surgeon: Latia Yoon MD;  Location: [facility] OR;  Service: Urology;  Laterality: N/A;    VASECTOMY      WISDOM TOOTH EXTRACTION         Current Outpatient Medications   Medication Sig    acetaminophen (TYLENOL) 325 MG tablet Take 325 mg by mouth every 6 (six) hours as needed for Pain.    ANDROGEL 20.25 mg/1.25 gram (1.62 %) GlPm     aspirin (ECOTRIN) 81 MG EC tablet Take 81 mg by mouth once daily.    azelastine (ASTELIN) 137 mcg nasal spray 2 sprays (274 mcg total) by Nasal route 2 (two) times daily.    diclofenac sodium (VOLTAREN) 1 % Gel     diphenhydrAMINE (BENADRYL) 50 MG capsule Take 50 mg by mouth every 6 (six) hours as needed for Itching.    EPIPEN 2-KIRIT 0.3 mg/0.3 mL AtIn     fluocinolone (DERMA-SMOOTHE) 0.01 % external oil Apply topically 3 (three) times daily. Apply to damp scalp at bedtime, wash off qam, avoid chronic use.    fluticasone propionate (FLONASE) 50 mcg/actuation nasal spray     hydrOXYzine HCL (ATARAX) 25 MG tablet TK 1 T PO Q 8 H PRN    losartan (COZAAR) 100 MG tablet     montelukast (SINGULAIR) 10 mg tablet TAKE 1 TABLET DAILY    omeprazole (PRILOSEC) 10 MG capsule Take 10 mg by mouth once daily.    simvastatin (ZOCOR) 10 MG tablet      triamcinolone acetonide 0.1% (KENALOG) 0.1 % cream aaa bid x 2 weeks then prn, avoid chronic use    tadalafiL (CIALIS) 5 MG tablet Take 1 tablet (5 mg total) by mouth once daily.     No current facility-administered medications for this visit.       Review of patient's allergies indicates:   Allergen Reactions    Adhesive     Codeine Hives    Morphine Hives    Sulfa (sulfonamide antibiotics) Hives and Itching       Family History   Problem Relation Age of Onset    Dementia Mother     Cancer Father         skin    Heart disease Father     Allergic rhinitis Neg Hx     Allergies Neg Hx     Angioedema Neg Hx     Asthma Neg Hx     Atopy Neg Hx     Eczema Neg Hx     Immunodeficiency Neg Hx     Rhinitis Neg Hx     Urticaria Neg Hx        Social History     Socioeconomic History    Marital status:    Tobacco Use    Smoking status: Former Smoker     Packs/day: 1.00     Years: 10.00     Pack years: 10.00     Quit date: 4/3/1974     Years since quittin.0    Smokeless tobacco: Never Used   Substance and Sexual Activity    Alcohol use: No    Drug use: No       Chief Complaint:   Chief Complaint   Patient presents with    Injections     R orthovisc 3/3       Date of surgery:  2020    History of present illness: 76-year-old male underwent left total knee arthroplasty.  Right knee is been bothering him for the last few weeks.  Also having more pain in the left base of the thumb.  Been hurting for about a month or so now.    Review of Systems:    Musculoskeletal:  See HPI        Physical Examination:    Vital Signs:    Vitals:    22 1529   Resp: 18       Body mass index is 35.58 kg/m².    This a well-developed, well nourished patient in no acute distress.  They are alert and oriented and cooperative to examination.  Pt. walks without an antalgic gait.      Examination of the right knee shows no rashes or erythema. There are no masses ecchymosis or effusion. Patient has full range  of motion from 0-130°. Patient is nontender to palpation over lateral joint line and moderately tender to palpation over the medial joint line. Patient has a - Lachman exam, - anterior drawer exam, and - posterior drawer exam. - Juliana's exam. Knee is stable to varus and valgus stress. 5 out of 5 motor strength. Palpable distal pulses. Intact light touch sensation. Negative Patellofemoral crepitus    Examination of the left hand and wrist shows no signs of rashes or erythema. Patient has no masses ecchymosis or effusions. Patient has full range of motion of the wrist in flexion and extension as well as ulnar and radial deviation. The patient also has full range of motion of all joints in the hand. There are 2+ radial pulse and intact light touch sensation in all 5 digits. Nontender over the anatomic snuffbox.  Pain over the CMC joint        X-rays:  Four views of the left knee are  reviewed which show well-aligned left total knee arthroplasty components without complication.  Severe medial compartment right knee arthritis     Assessment::  Status post left [place]y CR total knee arthroplasty  Severe right varus knee arthritis  Left CMC arthritis    Plan:   Patient is here to do his Orthovisc series on the right.  I injected his right knee with Orthovisc 3 of 3. Follow up in 6 months.    This note was created using [employer] voice recognition software that occasionally misinterpreted phrases or words.

## 2022-03-28 NOTE — PROCEDURES
Large Joint Aspiration/Injection: R knee joint    Date/Time: 3/28/2022 4:15 PM  Performed by: Max Garcia MD  Authorized by: Max Garcia MD     Consent Done?:  Yes (Verbal)  Indications:  Pain  Site marked: the procedure site was marked    Timeout: prior to procedure the correct patient, procedure, and site was verified      Details:  Needle Size:  20 G  Approach:  Anterolateral  Location:  Knee  Site:  R knee joint  Medications:  30 mg sodium hyaluronate (orthovisc) 30 mg/2 mL  Patient tolerance:  Patient tolerated the procedure well with no immediate complications

## 2022-03-28 NOTE — PROGRESS NOTES
[facility] Note  Staff: MD Karrie  Referring: TONY Meadows MD      My chart: active    Chief Complaint: No chief complaint on file.      Subjective:        HPI: Manpreet Kerr is a 76 y.o. male     Interval history 3/23/21:  He has a h/o elevated PSA, prostate biopsy and US in in 2008, urinary retention x 2  In 2016, hypogonadism, requesting vas reversal, recurrent uti's.  Cysto trus on 8/12/19 showed b lobe hypertrophy, no stricture. 22g prostate. No IV median lobe. Ctu showed no obvious cause for uti's. Put him on cialis 5mg daily. Residuals improved to 30-50cc with better stream. 10/15/19 UF on tadalafil 5mg daily : avg flow 11, voided vol: 320, pvr by scan: 56. Repeat UF 2/20/20 peak flow 14.3, avg flow 7.9, voided vol: 257, pvr by scan: 25  · At last visit 9/2020 he was found to have retention post knee surgery. Asked him to start flomax 0.4mg nightly again and then return for fill and pull. pvr was 25cc  · Returns today and states urinating with issues. Took flomax for a few days to urinate last time and it worked but he feels bad when he takes it. Taking tadalafil 5mg once daily. Still on testosterone. No vasovasostomy. He's been using 20mg as needed for ED in addition to the 5mg daily (15mg additional).   · AUA ssx:(0 incomplete emptying, 0 frequency, 0 intermittency, 0 urgency, 1 weak stream, 0 straining, 1 sleeping). 2. QOL: delighted  · No psa since 11/2019, 0.48 then and he was 72 yo. ua with no blood or eluk No uti's since I last saw him    Interval history 3/28/22:  · In 6/2021 and 12/2021 he had some itching, frequency and dysuria. He went to urgent care both times. He was told he had uti each time and he was given antibiotics. He thinks it's due to not drinking a enough. ua today: neg  · He says his main issue is his oversensitivity to paprika and bladder which causes frequency, burning and incontinence.  · He feels like he has a good urine  flow. He is on low dose cialis 5mg for bph. pvr by scan:42. He uses additional cialis for Ed (will take 20 as needed)  · On androgel 3 pumps a day/his pcp from [place] writes for this.     Urine history:   3/29/22 Neg, pvr by scan: 42  2/6/20  Tr leuk  10/15/19 neg/100 glucose  8/12/19 Ng, void: n/a  7/30/19 No cx, void:neg,   7/18/19 No cx, void:  tr leuk - on cipro 3rd day  10/16/18 No cx, void: neg, , mycoplasma and urea neg   9/1/18  Multiple org, void: sml leuk, 30-50 wbc  3/8/18  Pvr: 21 (fill a nd pull)   3/2018  Pvr by I&O: 700cc   10/29/17 No cx, void:  Neg  6/2/16  E.coli  6/7/16  Pvr: 11cc  5/31/16 pvr by I&O:  460cc   2/24/15 No cx, void:neg  1/7/15  E.coli res to amp, cipro, lev, tetra, void: neg  10/9/14 No cx, void: neg  4/9/14  No c,x void: neg  3/15/10 Ng, void:  neg  11/6/08 Multiple org    psa history: MGF - had prostate cancer at a.84  3/23/21 DARRYL: 30g no nodules  8/12/19 Cysto trus 22.2g prostate with mod b lobe hypertrophy and no IV median lobe. pvr by aspiration: 60cc. Recommended urolift.   7/31/19 0.48  7/30/19 DARRYL: 30g, firm on right, pvr by scan: 30cc  7/18/19  pvr: 55cc. UF avg flow 2.9mL/s, voided volume: 37. Pvr: 63cc   10/16/18  pvr: 0  12/15/17 0.458  2016  0.31  2015  0.38  2014  0.5  1/16/12 0.56   1/5/11  0.36  1/6/10  0.30  2008  Negative prostate biopsy       REVIEW OF SYSTEMS:  As above    Allergies:  Adhesive, Codeine, Morphine, and Sulfa (sulfonamide antibiotics)   Sulfa     Anticoagulation: Yes - asa 81mg daily    Objective:     Vitals:    03/29/22 1323   BP: 131/79   Pulse: 75   Resp: 18           Assessment:         Manpreet Kerr is a 76 y.o. male     Discussed rezum for pt for his bph but he would rather stay on cialis 5mg daily and he wants to avoid any procedure. Will readdress in future if necessary (oab symptoms, slower stream)    For bph continue tadalafil 5mg daily     For ed continue tadlafil 5mg daily + additional 15mg as needed    Sounds like he has IC  sometimes worsened with certain foods. Pyridium helps this.       1. Benign prostatic hyperplasia, unspecified whether lower urinary tract symptoms present    2. Erectile dysfunction, unspecified erectile dysfunction type          Plan:   · Continue cialis 5mg daily for [facility] BPh. [facility]   · Continue to use cialis 5mg as needed in addition to the daily dose of cialis paid [facility] by [facility] ([facility] He needs rx for this from me.  [facility] and he wants to continue to see me for this.   · Refilled pyridium to use as needed if he eats certain foods. Can try prelief OTC  · F/u in 1 year - aua ssx, bph, pvr AND parminder in a year      Latia Yoon MD

## 2022-03-29 ENCOUNTER — PATIENT MESSAGE (OUTPATIENT)
Dept: UROLOGY | Facility: CLINIC | Age: 77
End: 2022-03-29

## 2022-03-29 ENCOUNTER — OFFICE VISIT (OUTPATIENT)
Dept: UROLOGY | Facility: CLINIC | Age: 77
End: 2022-03-29
Payer: MEDICARE

## 2022-03-29 VITALS
RESPIRATION RATE: 18 BRPM | WEIGHT: 240.44 LBS | HEIGHT: 70 IN | BODY MASS INDEX: 34.42 KG/M2 | HEART RATE: 75 BPM | DIASTOLIC BLOOD PRESSURE: 79 MMHG | SYSTOLIC BLOOD PRESSURE: 131 MMHG

## 2022-03-29 DIAGNOSIS — N52.9 ERECTILE DYSFUNCTION, UNSPECIFIED ERECTILE DYSFUNCTION TYPE: ICD-10-CM

## 2022-03-29 DIAGNOSIS — N40.0 BENIGN PROSTATIC HYPERPLASIA, UNSPECIFIED WHETHER LOWER URINARY TRACT SYMPTOMS PRESENT: Primary | ICD-10-CM

## 2022-03-29 LAB
BILIRUB SERPL-MCNC: NORMAL MG/DL
BLOOD URINE, POC: NORMAL
CLARITY, POC UA: CLEAR
COLOR, POC UA: YELLOW
GLUCOSE UR QL STRIP: NORMAL
KETONES UR QL STRIP: NORMAL
LEUKOCYTE ESTERASE URINE, POC: NORMAL
NITRITE, POC UA: NORMAL
PH, POC UA: 7
POC RESIDUAL URINE VOLUME: 42 ML (ref 0–100)
PROTEIN, POC: NORMAL
SPECIFIC GRAVITY, POC UA: 1.02
UROBILINOGEN, POC UA: 0.2

## 2022-03-29 PROCEDURE — 99213 OFFICE O/P EST LOW 20 MIN: CPT | Mod: PBBFAC,PN | Performed by: UROLOGY

## 2022-03-29 PROCEDURE — 99999 PR PBB SHADOW E&M-EST. PATIENT-LVL III: CPT | Mod: PBBFAC,,, | Performed by: UROLOGY

## 2022-03-29 PROCEDURE — 81002 URINALYSIS NONAUTO W/O SCOPE: CPT | Mod: PBBFAC,PN | Performed by: UROLOGY

## 2022-03-29 PROCEDURE — 99213 PR OFFICE/OUTPT VISIT, EST, LEVL III, 20-29 MIN: ICD-10-PCS | Mod: S$PBB,,, | Performed by: UROLOGY

## 2022-03-29 PROCEDURE — 99999 PR PBB SHADOW E&M-EST. PATIENT-LVL III: ICD-10-PCS | Mod: PBBFAC,,, | Performed by: UROLOGY

## 2022-03-29 PROCEDURE — 51798 US URINE CAPACITY MEASURE: CPT | Mod: PBBFAC,PN | Performed by: UROLOGY

## 2022-03-29 PROCEDURE — 99213 OFFICE O/P EST LOW 20 MIN: CPT | Mod: S$PBB,,, | Performed by: UROLOGY

## 2022-03-29 RX ORDER — PHENAZOPYRIDINE HYDROCHLORIDE 200 MG/1
200 TABLET, FILM COATED ORAL 3 TIMES DAILY PRN
Qty: 30 TABLET | Refills: 1 | Status: SHIPPED | OUTPATIENT
Start: 2022-03-29 | End: 2022-04-08

## 2022-03-29 RX ORDER — TADALAFIL 5 MG/1
5 TABLET ORAL DAILY
Qty: 90 TABLET | Refills: 3
Start: 2022-03-29 | End: 2022-08-04

## 2022-03-29 NOTE — PATIENT INSTRUCTIONS
Discussed rezum for pt for his bph but he would rather stay on cialis 5mg daily and he wants to avoid any procedure. Will readdress in future if necessary (oab symptoms, slower stream)    For bph continue tadalafil 5mg daily     For ed continue tadlafil 5mg daily + additional 15mg as needed    Sounds like he has IC sometimes worsened with certain foods. Pyridium helps this.       1. Benign prostatic hyperplasia, unspecified whether lower urinary tract symptoms present    2. Erectile dysfunction, unspecified erectile dysfunction type          Plan:   Continue cialis 5mg daily for his BPh. [facility].   Continue to use cialis 5mg as needed in addition to the daily dose of cialis paid for by [facility] ([facility]). He needs rx for this from me.  [facility] not [facility] this for him and he wants to continue to see me for this.   Refilled pyridium to use as needed if he eats certain foods. Can try prelief OTC  F/u in 1 year - aua ssx, bph, pvr       PRELIEF- TAKE BEFORE EATING BOTHERSOME FOODS

## 2022-08-09 ENCOUNTER — TELEPHONE (OUTPATIENT)
Dept: ORTHOPEDICS | Facility: CLINIC | Age: 77
End: 2022-08-09
Payer: MEDICARE

## 2022-08-09 NOTE — TELEPHONE ENCOUNTER
----- Message from Lj Lou MA sent at 8/9/2022  3:42 PM CDT -----  Contact: patient  Patient called in and stated he is having pain in right knee and is not schedule to start his injections until 9/29/22.  Patient wanted to know if he could get a steroid injection in the meantime.    Call back number is [phone]

## 2022-08-11 DIAGNOSIS — M17.0 PRIMARY OSTEOARTHRITIS OF BOTH KNEES: Primary | ICD-10-CM

## 2022-08-12 ENCOUNTER — HOSPITAL ENCOUNTER (OUTPATIENT)
Dept: RADIOLOGY | Facility: HOSPITAL | Age: 77
Discharge: HOME | End: 2022-08-12
Attending: ORTHOPAEDIC SURGERY
Payer: MEDICARE

## 2022-08-12 DIAGNOSIS — M17.0 PRIMARY OSTEOARTHRITIS OF BOTH KNEES: ICD-10-CM

## 2022-08-12 PROCEDURE — 73562 X-RAY EXAM OF KNEE 3: CPT | Mod: 59,TC,PN,LT

## 2022-08-12 PROCEDURE — 73562 X-RAY EXAM OF KNEE 3: CPT | Mod: 26,LT,, | Performed by: RADIOLOGY

## 2022-08-12 PROCEDURE — 73564 XR KNEE ORTHO RIGHT WITH FLEXION: ICD-10-PCS | Mod: 26,RT,, | Performed by: RADIOLOGY

## 2022-08-12 PROCEDURE — 73562 XR KNEE ORTHO RIGHT WITH FLEXION: ICD-10-PCS | Mod: 26,LT,, | Performed by: RADIOLOGY

## 2022-08-12 PROCEDURE — 73564 X-RAY EXAM KNEE 4 OR MORE: CPT | Mod: 26,RT,, | Performed by: RADIOLOGY

## 2022-08-18 ENCOUNTER — OFFICE VISIT (OUTPATIENT)
Dept: ORTHOPEDICS | Facility: CLINIC | Age: 77
End: 2022-08-18
Payer: MEDICARE

## 2022-08-18 VITALS — HEIGHT: 70 IN | WEIGHT: 240 LBS | RESPIRATION RATE: 16 BRPM | BODY MASS INDEX: 34.36 KG/M2

## 2022-08-18 DIAGNOSIS — M17.10 ARTHRITIS OF KNEE: Primary | ICD-10-CM

## 2022-08-18 PROCEDURE — 20610 DRAIN/INJ JOINT/BURSA W/O US: CPT | Mod: PBBFAC,PN | Performed by: ORTHOPAEDIC SURGERY

## 2022-08-18 PROCEDURE — 20610 LARGE JOINT ASPIRATION/INJECTION: R KNEE: ICD-10-PCS | Mod: S$PBB,RT,, | Performed by: ORTHOPAEDIC SURGERY

## 2022-08-18 PROCEDURE — 99213 PR OFFICE/OUTPT VISIT, EST, LEVL III, 20-29 MIN: ICD-10-PCS | Mod: 25,S$PBB,, | Performed by: ORTHOPAEDIC SURGERY

## 2022-08-18 PROCEDURE — 99999 PR PBB SHADOW E&M-EST. PATIENT-LVL III: ICD-10-PCS | Mod: PBBFAC,,, | Performed by: ORTHOPAEDIC SURGERY

## 2022-08-18 PROCEDURE — 99213 OFFICE O/P EST LOW 20 MIN: CPT | Mod: PBBFAC,PN,25 | Performed by: ORTHOPAEDIC SURGERY

## 2022-08-18 PROCEDURE — 99213 OFFICE O/P EST LOW 20 MIN: CPT | Mod: 25,S$PBB,, | Performed by: ORTHOPAEDIC SURGERY

## 2022-08-18 PROCEDURE — 99999 PR PBB SHADOW E&M-EST. PATIENT-LVL III: CPT | Mod: PBBFAC,,, | Performed by: ORTHOPAEDIC SURGERY

## 2022-08-18 RX ORDER — VALACYCLOVIR HYDROCHLORIDE 1 G/1
TABLET, FILM COATED ORAL
COMMUNITY
Start: 2022-06-21 | End: 2024-03-28

## 2022-08-18 RX ORDER — TRIAMCINOLONE ACETONIDE 40 MG/ML
40 INJECTION, SUSPENSION INTRA-ARTICULAR; INTRAMUSCULAR
Status: DISCONTINUED | OUTPATIENT
Start: 2022-08-18 | End: 2022-08-18 | Stop reason: HOSPADM

## 2022-08-18 RX ADMIN — TRIAMCINOLONE ACETONIDE 40 MG: 40 INJECTION, SUSPENSION INTRA-ARTICULAR; INTRAMUSCULAR at 01:08

## 2022-08-18 NOTE — PROGRESS NOTES
Past Medical History:   Diagnosis Date    Allergy     Arthritis     BPH with obstruction/lower urinary tract symptoms     Environmental allergies     H/O prostatitis     History of urinary retention     [place] (hard of hearing)     WEARS BILATERAL HEARING AIDS    [place] (hard of hearing)     wears hearing aids    Hypertension     Sleep apnea     Urinary tract infection        Past Surgical History:   Procedure Laterality Date    HERNIA REPAIR      INGUNAL     KNEE ARTHROPLASTY Left 8/25/2020    Procedure: ARTHROPLASTY, KNEE;  Surgeon: Max Garcia MD;  Location: [facility] OR;  Service: Orthopedics;  Laterality: Left;    TRANSRECTAL ULTRASOUND EXAMINATION N/A 8/12/2019    Procedure: TRANSRECTAL ULTRASOUND;  Surgeon: Latia Yoon MD;  Location: [facility] OR;  Service: Urology;  Laterality: N/A;    VASECTOMY      WISDOM TOOTH EXTRACTION         Current Outpatient Medications   Medication Sig    valACYclovir (VALTREX) 1000 MG tablet     acetaminophen (TYLENOL) 325 MG tablet Take 325 mg by mouth every 6 (six) hours as needed for Pain.    ANDROGEL 20.25 mg/1.25 gram (1.62 %) GlPm     aspirin (ECOTRIN) 81 MG EC tablet Take 81 mg by mouth once daily.    azelastine (ASTELIN) 137 mcg nasal spray 2 sprays (274 mcg total) by Nasal route 2 (two) times daily.    diclofenac sodium (VOLTAREN) 1 % Gel     diphenhydrAMINE (BENADRYL) 50 MG capsule Take 50 mg by mouth every 6 (six) hours as needed for Itching.    EPIPEN 2-KIRIT 0.3 mg/0.3 mL AtIn     fluocinolone (DERMA-SMOOTHE) 0.01 % external oil Apply topically 3 (three) times daily. Apply to damp scalp at bedtime, wash off qam, avoid chronic use.    fluticasone propionate (FLONASE) 50 mcg/actuation nasal spray     hydrOXYzine HCL (ATARAX) 25 MG tablet TK 1 T PO Q 8 H PRN    losartan (COZAAR) 100 MG tablet     montelukast (SINGULAIR) 10 mg tablet TAKE 1 TABLET DAILY    omeprazole (PRILOSEC) 10 MG capsule Take 10 mg by mouth once daily.     simvastatin (ZOCOR) 10 MG tablet     tadalafiL (CIALIS) 5 MG tablet Take 1 tablet (5 mg total) by mouth once daily.    triamcinolone acetonide 0.1% (KENALOG) 0.1 % cream aaa bid x 2 weeks then prn, avoid chronic use     No current facility-administered medications for this visit.       Review of patient's allergies indicates:   Allergen Reactions    Adhesive     Codeine Hives    Morphine Hives    Sulfa (sulfonamide antibiotics) Hives and Itching       Family History   Problem Relation Age of Onset    Dementia Mother     Cancer Father         skin    Heart disease Father     Allergic rhinitis Neg Hx     Allergies Neg Hx     Angioedema Neg Hx     Asthma Neg Hx     Atopy Neg Hx     Eczema Neg Hx     Immunodeficiency Neg Hx     Rhinitis Neg Hx     Urticaria Neg Hx        Social History     Socioeconomic History    Marital status:    Tobacco Use    Smoking status: Former Smoker     Packs/day: 1.00     Years: 10.00     Pack years: 10.00     Quit date: 4/3/1974     Years since quittin.4    Smokeless tobacco: Never Used   Substance and Sexual Activity    Alcohol use: No    Drug use: No       Chief Complaint:   Chief Complaint   Patient presents with    Right Knee - Pain       Date of surgery:  2020    History of present illness: 76-year-old male underwent left total knee arthroplasty.  Right knee is been bothering him for the last few weeks. Been hurting for about a month or so now.    Review of Systems:    Musculoskeletal:  See HPI        Physical Examination:    Vital Signs:    Vitals:    22 1303   Resp: 16       Body mass index is 34.44 kg/m².    This a well-developed, well nourished patient in no acute distress.  They are alert and oriented and cooperative to examination.  Pt. walks without an antalgic gait.      Examination of the right knee shows no rashes or erythema. There are no masses ecchymosis or effusion. Patient has full range of motion from 0-130°. Patient  is nontender to palpation over lateral joint line and moderately tender to palpation over the medial joint line. Patient has a - Lachman exam, - anterior drawer exam, and - posterior drawer exam. - Juliana's exam. Knee is stable to varus and valgus stress. 5 out of 5 motor strength. Palpable distal pulses. Intact light touch sensation. Negative Patellofemoral crepitus    Examination of the left hand and wrist shows no signs of rashes or erythema. Patient has no masses ecchymosis or effusions. Patient has full range of motion of the wrist in flexion and extension as well as ulnar and radial deviation. The patient also has full range of motion of all joints in the hand. There are 2+ radial pulse and intact light touch sensation in all 5 digits. Nontender over the anatomic snuffbox.  Pain over the CMC joint        X-rays:  Four views of the left knee are  reviewed which show well-aligned left total knee arthroplasty components without complication.  Severe medial compartment right knee arthritis     Assessment::  Status post left Evelia CR total knee arthroplasty  Severe right varus knee arthritis  Left CMC arthritis    Plan:   I injected his right knee with cortisone today.  Hopefully this will tide him over until he can resume his Orthovisc in about 2 months.    This note was created using [employer] voice recognition software that occasionally misinterpreted phrases or words.

## 2022-08-18 NOTE — PROCEDURES
Large Joint Aspiration/Injection: R knee    Date/Time: 8/18/2022 1:00 PM  Performed by: Max Garcia MD  Authorized by: Max Garcia MD     Consent Done?:  Yes (Verbal)  Indications:  Pain  Site marked: the procedure site was marked    Timeout: prior to procedure the correct patient, procedure, and site was verified    Local anesthetic:  Lidocaine 1% without epinephrine and bupivacaine 0.25% without epinephrine  Anesthetic total (ml):  6      Details:  Needle Size:  20 G  Approach:  Anterolateral  Location:  Knee  Site:  R knee  Medications:  40 mg triamcinolone acetonide 40 mg/mL  Patient tolerance:  Patient tolerated the procedure well with no immediate complications

## 2022-10-03 ENCOUNTER — OFFICE VISIT (OUTPATIENT)
Dept: ORTHOPEDICS | Facility: CLINIC | Age: 77
End: 2022-10-03
Payer: MEDICARE

## 2022-10-03 DIAGNOSIS — M17.10 ARTHRITIS OF KNEE: Primary | ICD-10-CM

## 2022-10-03 PROCEDURE — 20610 DRAIN/INJ JOINT/BURSA W/O US: CPT | Mod: S$PBB,RT,, | Performed by: ORTHOPAEDIC SURGERY

## 2022-10-03 PROCEDURE — 20610 PR DRAIN/INJECT LARGE JOINT/BURSA: ICD-10-PCS | Mod: S$PBB,RT,, | Performed by: ORTHOPAEDIC SURGERY

## 2022-10-03 PROCEDURE — 99999 PR PBB SHADOW E&M-EST. PATIENT-LVL I: CPT | Mod: PBBFAC,,, | Performed by: ORTHOPAEDIC SURGERY

## 2022-10-03 PROCEDURE — 20610 DRAIN/INJ JOINT/BURSA W/O US: CPT | Mod: PBBFAC,RT | Performed by: ORTHOPAEDIC SURGERY

## 2022-10-03 PROCEDURE — 99999 PR PBB SHADOW E&M-EST. PATIENT-LVL I: ICD-10-PCS | Mod: PBBFAC,,, | Performed by: ORTHOPAEDIC SURGERY

## 2022-10-03 PROCEDURE — 99499 UNLISTED E&M SERVICE: CPT | Mod: S$PBB,,, | Performed by: ORTHOPAEDIC SURGERY

## 2022-10-03 PROCEDURE — 99499 NO LOS: ICD-10-PCS | Mod: S$PBB,,, | Performed by: ORTHOPAEDIC SURGERY

## 2022-10-03 RX ADMIN — Medication 30 MG: at 08:10

## 2022-10-03 NOTE — PROGRESS NOTES
Past Medical History:   Diagnosis Date    Allergy     Arthritis     BPH with obstruction/lower urinary tract symptoms     Environmental allergies     H/O prostatitis     History of urinary retention     [place] (hard of hearing)     WEARS BILATERAL HEARING AIDS    [place] (hard of hearing)     wears hearing aids    Hypertension     Sleep apnea     Urinary tract infection        Past Surgical History:   Procedure Laterality Date    HERNIA REPAIR      INGUNAL     KNEE ARTHROPLASTY Left 8/25/2020    Procedure: ARTHROPLASTY, KNEE;  Surgeon: Max Garcia MD;  Location: [facility] OR;  Service: Orthopedics;  Laterality: Left;    TRANSRECTAL ULTRASOUND EXAMINATION N/A 8/12/2019    Procedure: TRANSRECTAL ULTRASOUND;  Surgeon: Latia Yoon MD;  Location: [facility] OR;  Service: Urology;  Laterality: N/A;    VASECTOMY      WISDOM TOOTH EXTRACTION         Current Outpatient Medications   Medication Sig    acetaminophen (TYLENOL) 325 MG tablet Take 325 mg by mouth every 6 (six) hours as needed for Pain.    ANDROGEL 20.25 mg/1.25 gram (1.62 %) GlPm     aspirin (ECOTRIN) 81 MG EC tablet Take 81 mg by mouth once daily.    azelastine (ASTELIN) 137 mcg nasal spray 2 sprays (274 mcg total) by Nasal route 2 (two) times daily.    diclofenac sodium (VOLTAREN) 1 % Gel     diphenhydrAMINE (BENADRYL) 50 MG capsule Take 50 mg by mouth every 6 (six) hours as needed for Itching.    EPIPEN 2-KIRIT 0.3 mg/0.3 mL AtIn     fluocinolone (DERMA-SMOOTHE) 0.01 % external oil Apply topically 3 (three) times daily. Apply to damp scalp at bedtime, wash off qam, avoid chronic use.    fluticasone propionate (FLONASE) 50 mcg/actuation nasal spray     hydrOXYzine HCL (ATARAX) 25 MG tablet TK 1 T PO Q 8 H PRN    losartan (COZAAR) 100 MG tablet     montelukast (SINGULAIR) 10 mg tablet TAKE 1 TABLET DAILY    omeprazole (PRILOSEC) 10 MG capsule Take 10 mg by mouth once daily.    simvastatin (ZOCOR) 10 MG tablet     tadalafiL (CIALIS) 5 MG tablet Take 1  tablet (5 mg total) by mouth once daily.    triamcinolone acetonide 0.1% (KENALOG) 0.1 % cream aaa bid x 2 weeks then prn, avoid chronic use    valACYclovir (VALTREX) 1000 MG tablet      No current facility-administered medications for this visit.       Review of patient's allergies indicates:   Allergen Reactions    Adhesive     Codeine Hives    Morphine Hives    Sulfa (sulfonamide antibiotics) Hives and Itching       Family History   Problem Relation Age of Onset    Dementia Mother     Cancer Father         skin    Heart disease Father     Allergic rhinitis Neg Hx     Allergies Neg Hx     Angioedema Neg Hx     Asthma Neg Hx     Atopy Neg Hx     Eczema Neg Hx     Immunodeficiency Neg Hx     Rhinitis Neg Hx     Urticaria Neg Hx        Social History     Socioeconomic History    Marital status:    Tobacco Use    Smoking status: Former     Packs/day: 1.00     Years: 10.00     Pack years: 10.00     Types: Cigarettes     Quit date: 4/3/1974     Years since quittin.5    Smokeless tobacco: Never   Substance and Sexual Activity    Alcohol use: No    Drug use: No       Chief Complaint:   No chief complaint on file.      Date of surgery:  2020    History of present illness: 76-year-old male underwent left total knee arthroplasty.  Right knee is been bothering him for the last few weeks. Been hurting for about a month or so now.    Review of Systems:    Musculoskeletal:  See HPI        Physical Examination:    Vital Signs:    There were no vitals filed for this visit.      There is no height or weight on file to calculate BMI.    This a well-developed, well nourished patient in no acute distress.  They are alert and oriented and cooperative to examination.  Pt. walks without an antalgic gait.      Examination of the right knee shows no rashes or erythema. There are no masses ecchymosis or effusion. Patient has full range of motion from 0-130°. Patient is nontender to palpation over lateral joint line  and moderately tender to palpation over the medial joint line. Patient has a - Lachman exam, - anterior drawer exam, and - posterior drawer exam. - Juliana's exam. Knee is stable to varus and valgus stress. 5 out of 5 motor strength. Palpable distal pulses. Intact light touch sensation. Negative Patellofemoral crepitus    Examination of the left hand and wrist shows no signs of rashes or erythema. Patient has no masses ecchymosis or effusions. Patient has full range of motion of the wrist in flexion and extension as well as ulnar and radial deviation. The patient also has full range of motion of all joints in the hand. There are 2+ radial pulse and intact light touch sensation in all 5 digits. Nontender over the anatomic snuffbox.  Pain over the CMC joint        X-rays:  Four views of the left knee are  reviewed which show well-aligned left total knee arthroplasty components without complication.  Severe medial compartment right knee arthritis     Assessment::  Status post left Evelia CR total knee arthroplasty  Severe right varus knee arthritis  Left CMC arthritis    Plan:   I injected his right knee with Orthovisc 1/3.  Follow up next week to continue.    This note was created using [employer] voice recognition software that occasionally misinterpreted phrases or words.

## 2022-10-10 ENCOUNTER — OFFICE VISIT (OUTPATIENT)
Dept: ORTHOPEDICS | Facility: CLINIC | Age: 77
End: 2022-10-10
Payer: MEDICARE

## 2022-10-10 VITALS — WEIGHT: 240 LBS | BODY MASS INDEX: 34.36 KG/M2 | RESPIRATION RATE: 18 BRPM | HEIGHT: 70 IN

## 2022-10-10 DIAGNOSIS — M18.12 ARTHRITIS OF CARPOMETACARPAL (CMC) JOINT OF LEFT THUMB: ICD-10-CM

## 2022-10-10 DIAGNOSIS — M17.10 ARTHRITIS OF KNEE: Primary | ICD-10-CM

## 2022-10-10 DIAGNOSIS — Z96.652 STATUS POST TOTAL LEFT KNEE REPLACEMENT USING CEMENT: ICD-10-CM

## 2022-10-10 PROCEDURE — 99999 PR PBB SHADOW E&M-EST. PATIENT-LVL III: CPT | Mod: PBBFAC,,, | Performed by: ORTHOPAEDIC SURGERY

## 2022-10-10 PROCEDURE — 20600 SMALL JOINT ASPIRATION/INJECTION: L THUMB CMC: ICD-10-PCS | Mod: S$PBB,RT,, | Performed by: ORTHOPAEDIC SURGERY

## 2022-10-10 PROCEDURE — 99499 NO LOS: ICD-10-PCS | Mod: S$PBB,,, | Performed by: ORTHOPAEDIC SURGERY

## 2022-10-10 PROCEDURE — 99999 PR PBB SHADOW E&M-EST. PATIENT-LVL III: ICD-10-PCS | Mod: PBBFAC,,, | Performed by: ORTHOPAEDIC SURGERY

## 2022-10-10 PROCEDURE — 20600 DRAIN/INJ JOINT/BURSA W/O US: CPT | Mod: PBBFAC,PN,LT | Performed by: ORTHOPAEDIC SURGERY

## 2022-10-10 PROCEDURE — 99499 UNLISTED E&M SERVICE: CPT | Mod: S$PBB,,, | Performed by: ORTHOPAEDIC SURGERY

## 2022-10-10 RX ORDER — TRIAMCINOLONE ACETONIDE 40 MG/ML
40 INJECTION, SUSPENSION INTRA-ARTICULAR; INTRAMUSCULAR
Status: DISCONTINUED | OUTPATIENT
Start: 2022-10-10 | End: 2022-10-10 | Stop reason: HOSPADM

## 2022-10-10 RX ADMIN — TRIAMCINOLONE ACETONIDE 40 MG: 40 INJECTION, SUSPENSION INTRA-ARTICULAR; INTRAMUSCULAR at 08:10

## 2022-10-10 RX ADMIN — Medication 30 MG: at 08:10

## 2022-10-10 NOTE — PROCEDURES
Small Joint Aspiration/Injection: L thumb CMC    Date/Time: 10/10/2022 8:00 AM  Performed by: Max Garcia MD  Authorized by: Max Garcia MD     Consent Done?:  Yes (Verbal)  Indications:  Pain  Site marked: the procedure site was marked    Timeout: prior to procedure the correct patient, procedure, and site was verified    Prep: patient was prepped and draped in usual sterile fashion      Local anesthesia used?: Yes    Anesthesia:  Local infiltration  Local anesthetic:  Lidocaine 1% without epinephrine and bupivacaine 0.25% without epinephrine  Anesthetic total (ml):  2    Location:  Thumb  Site:  L thumb CMC  Needle size:  22 G  Medications:  40 mg triamcinolone acetonide 40 mg/mL  Patient tolerance:  Patient tolerated the procedure well with no immediate complications

## 2022-10-10 NOTE — PROCEDURES
Large Joint Aspiration/Injection: R knee joint    Date/Time: 10/10/2022 8:00 AM  Performed by: Max Garcia MD  Authorized by: Max Garcia MD     Consent Done?:  Yes (Verbal)  Indications:  Pain  Site marked: the procedure site was marked    Timeout: prior to procedure the correct patient, procedure, and site was verified      Details:  Needle Size:  20 G  Approach:  Anterolateral  Location:  Knee  Site:  R knee joint  Medications:  30 mg sodium hyaluronate (orthovisc) 30 mg/2 mL  Patient tolerance:  Patient tolerated the procedure well with no immediate complications

## 2022-10-10 NOTE — PROGRESS NOTES
Past Medical History:   Diagnosis Date    Allergy     Arthritis     BPH with obstruction/lower urinary tract symptoms     Environmental allergies     H/O prostatitis     History of urinary retention     [place] (hard of hearing)     WEARS BILATERAL HEARING AIDS    [place] (hard of hearing)     wears hearing aids    Hypertension     Sleep apnea     Urinary tract infection        Past Surgical History:   Procedure Laterality Date    HERNIA REPAIR      INGUNAL     KNEE ARTHROPLASTY Left 8/25/2020    Procedure: ARTHROPLASTY, KNEE;  Surgeon: Max Garcia MD;  Location: [facility] OR;  Service: Orthopedics;  Laterality: Left;    TRANSRECTAL ULTRASOUND EXAMINATION N/A 8/12/2019    Procedure: TRANSRECTAL ULTRASOUND;  Surgeon: Latia Yoon MD;  Location: [facility] OR;  Service: Urology;  Laterality: N/A;    VASECTOMY      WISDOM TOOTH EXTRACTION         Current Outpatient Medications   Medication Sig    acetaminophen (TYLENOL) 325 MG tablet Take 325 mg by mouth every 6 (six) hours as needed for Pain.    ANDROGEL 20.25 mg/1.25 gram (1.62 %) GlPm     aspirin (ECOTRIN) 81 MG EC tablet Take 81 mg by mouth once daily.    azelastine (ASTELIN) 137 mcg nasal spray 2 sprays (274 mcg total) by Nasal route 2 (two) times daily.    diclofenac sodium (VOLTAREN) 1 % Gel     diphenhydrAMINE (BENADRYL) 50 MG capsule Take 50 mg by mouth every 6 (six) hours as needed for Itching.    EPIPEN 2-KIRIT 0.3 mg/0.3 mL AtIn     fluocinolone (DERMA-SMOOTHE) 0.01 % external oil Apply topically 3 (three) times daily. Apply to damp scalp at bedtime, wash off qam, avoid chronic use.    fluticasone propionate (FLONASE) 50 mcg/actuation nasal spray     hydrOXYzine HCL (ATARAX) 25 MG tablet TK 1 T PO Q 8 H PRN    losartan (COZAAR) 100 MG tablet     montelukast (SINGULAIR) 10 mg tablet TAKE 1 TABLET DAILY    omeprazole (PRILOSEC) 10 MG capsule Take 10 mg by mouth once daily.    simvastatin (ZOCOR) 10 MG tablet     tadalafiL (CIALIS) 5 MG tablet Take 1  tablet (5 mg total) by mouth once daily.    triamcinolone acetonide 0.1% (KENALOG) 0.1 % cream aaa bid x 2 weeks then prn, avoid chronic use    valACYclovir (VALTREX) 1000 MG tablet      No current facility-administered medications for this visit.       Review of patient's allergies indicates:   Allergen Reactions    Adhesive     Codeine Hives    Morphine Hives    Sulfa (sulfonamide antibiotics) Hives and Itching       Family History   Problem Relation Age of Onset    Dementia Mother     Cancer Father         skin    Heart disease Father     Allergic rhinitis Neg Hx     Allergies Neg Hx     Angioedema Neg Hx     Asthma Neg Hx     Atopy Neg Hx     Eczema Neg Hx     Immunodeficiency Neg Hx     Rhinitis Neg Hx     Urticaria Neg Hx        Social History     Socioeconomic History    Marital status:    Tobacco Use    Smoking status: Former     Packs/day: 1.00     Years: 10.00     Pack years: 10.00     Types: Cigarettes     Quit date: 4/3/1974     Years since quittin.5    Smokeless tobacco: Never   Substance and Sexual Activity    Alcohol use: No    Drug use: No       Chief Complaint:   Chief Complaint   Patient presents with    Injections     orthovisc 2/3         Date of surgery:  2020    History of present illness: 76-year-old male underwent left total knee arthroplasty.  Right knee is been bothering him for the last few weeks. Been hurting for about a month or so now.    Review of Systems:    Musculoskeletal:  See HPI        Physical Examination:    Vital Signs:    Vitals:    10/10/22 0803   Resp: 18         Body mass index is 34.44 kg/m².    This a well-developed, well nourished patient in no acute distress.  They are alert and oriented and cooperative to examination.  Pt. walks without an antalgic gait.      Examination of the right knee shows no rashes or erythema. There are no masses ecchymosis or effusion. Patient has full range of motion from 0-130°. Patient is nontender to palpation over  lateral joint line and moderately tender to palpation over the medial joint line. Patient has a - Lachman exam, - anterior drawer exam, and - posterior drawer exam. - Juliana's exam. Knee is stable to varus and valgus stress. 5 out of 5 motor strength. Palpable distal pulses. Intact light touch sensation. Negative Patellofemoral crepitus    Examination of the left hand and wrist shows no signs of rashes or erythema. Patient has no masses ecchymosis or effusions. Patient has full range of motion of the wrist in flexion and extension as well as ulnar and radial deviation. The patient also has full range of motion of all joints in the hand. There are 2+ radial pulse and intact light touch sensation in all 5 digits. Nontender over the anatomic snuffbox.  Pain over the CMC joint        X-rays:  Four views of the left knee are  reviewed which show well-aligned left total knee arthroplasty components without complication.  Severe medial compartment right knee arthritis     Assessment::  Status post left Evelia CR total knee arthroplasty  Severe right varus knee arthritis  Left CMC arthritis    Plan:   I injected his right knee with Orthovisc 2/3.  Also agreed to inject his left CMC joint.  Follow up next week to continue.    This note was created using [employer] voice recognition software that occasionally misinterpreted phrases or words.

## 2022-10-20 ENCOUNTER — OFFICE VISIT (OUTPATIENT)
Dept: ORTHOPEDICS | Facility: CLINIC | Age: 77
End: 2022-10-20
Payer: MEDICARE

## 2022-10-20 VITALS — RESPIRATION RATE: 18 BRPM | WEIGHT: 240 LBS | BODY MASS INDEX: 34.36 KG/M2 | HEIGHT: 70 IN

## 2022-10-20 DIAGNOSIS — M17.11 PRIMARY OSTEOARTHRITIS OF RIGHT KNEE: Primary | ICD-10-CM

## 2022-10-20 PROCEDURE — 99999 PR PBB SHADOW E&M-EST. PATIENT-LVL III: ICD-10-PCS | Mod: PBBFAC,,, | Performed by: ORTHOPAEDIC SURGERY

## 2022-10-20 PROCEDURE — 99214 OFFICE O/P EST MOD 30 MIN: CPT | Mod: 25,57,S$PBB, | Performed by: ORTHOPAEDIC SURGERY

## 2022-10-20 PROCEDURE — 99999 PR PBB SHADOW E&M-EST. PATIENT-LVL III: CPT | Mod: PBBFAC,,, | Performed by: ORTHOPAEDIC SURGERY

## 2022-10-20 PROCEDURE — 99214 PR OFFICE/OUTPT VISIT, EST, LEVL IV, 30-39 MIN: ICD-10-PCS | Mod: 25,57,S$PBB, | Performed by: ORTHOPAEDIC SURGERY

## 2022-10-20 RX ADMIN — Medication 30 MG: at 08:10

## 2022-10-20 NOTE — PROGRESS NOTES
Past Medical History:   Diagnosis Date    Allergy     Arthritis     BPH with obstruction/lower urinary tract symptoms     Environmental allergies     H/O prostatitis     History of urinary retention     [place] (hard of hearing)     WEARS BILATERAL HEARING AIDS    [place] (hard of hearing)     wears hearing aids    Hypertension     Sleep apnea     Urinary tract infection        Past Surgical History:   Procedure Laterality Date    HERNIA REPAIR      INGUNAL     KNEE ARTHROPLASTY Left 8/25/2020    Procedure: ARTHROPLASTY, KNEE;  Surgeon: Max Garcia MD;  Location: [facility] OR;  Service: Orthopedics;  Laterality: Left;    TRANSRECTAL ULTRASOUND EXAMINATION N/A 8/12/2019    Procedure: TRANSRECTAL ULTRASOUND;  Surgeon: Latia Yoon MD;  Location: [facility] OR;  Service: Urology;  Laterality: N/A;    VASECTOMY      WISDOM TOOTH EXTRACTION         Current Outpatient Medications   Medication Sig    acetaminophen (TYLENOL) 325 MG tablet Take 325 mg by mouth every 6 (six) hours as needed for Pain.    ANDROGEL 20.25 mg/1.25 gram (1.62 %) GlPm     aspirin (ECOTRIN) 81 MG EC tablet Take 81 mg by mouth once daily.    azelastine (ASTELIN) 137 mcg nasal spray 2 sprays (274 mcg total) by Nasal route 2 (two) times daily.    diclofenac sodium (VOLTAREN) 1 % Gel     diphenhydrAMINE (BENADRYL) 50 MG capsule Take 50 mg by mouth every 6 (six) hours as needed for Itching.    EPIPEN 2-KIRIT 0.3 mg/0.3 mL AtIn     fluocinolone (DERMA-SMOOTHE) 0.01 % external oil Apply topically 3 (three) times daily. Apply to damp scalp at bedtime, wash off qam, avoid chronic use.    fluticasone propionate (FLONASE) 50 mcg/actuation nasal spray     hydrOXYzine HCL (ATARAX) 25 MG tablet TK 1 T PO Q 8 H PRN    losartan (COZAAR) 100 MG tablet     montelukast (SINGULAIR) 10 mg tablet TAKE 1 TABLET DAILY    omeprazole (PRILOSEC) 10 MG capsule Take 10 mg by mouth once daily.    simvastatin (ZOCOR) 10 MG tablet     tadalafiL (CIALIS) 5 MG tablet Take 1  tablet (5 mg total) by mouth once daily.    triamcinolone acetonide 0.1% (KENALOG) 0.1 % cream aaa bid x 2 weeks then prn, avoid chronic use    valACYclovir (VALTREX) 1000 MG tablet      No current facility-administered medications for this visit.       Review of patient's allergies indicates:   Allergen Reactions    Adhesive     Codeine Hives    Morphine Hives    Sulfa (sulfonamide antibiotics) Hives and Itching       Family History   Problem Relation Age of Onset    Dementia Mother     Cancer Father         skin    Heart disease Father     Allergic rhinitis Neg Hx     Allergies Neg Hx     Angioedema Neg Hx     Asthma Neg Hx     Atopy Neg Hx     Eczema Neg Hx     Immunodeficiency Neg Hx     Rhinitis Neg Hx     Urticaria Neg Hx        Social History     Socioeconomic History    Marital status:    Tobacco Use    Smoking status: Former     Packs/day: 1.00     Years: 10.00     Pack years: 10.00     Types: Cigarettes     Quit date: 4/3/1974     Years since quittin.5    Smokeless tobacco: Never   Substance and Sexual Activity    Alcohol use: No    Drug use: No       Chief Complaint:   Chief Complaint   Patient presents with    Follow-up     right knee orthovisc 3/3         Date of surgery:  2020    History of present illness: 76-year-old male underwent left total knee arthroplasty.  Right knee is been bothering him for the last few weeks. Been hurting for about a month or so now.    Review of Systems:    Musculoskeletal:  See HPI        Physical Examination:    Vital Signs:    Vitals:    10/20/22 0831   Resp: 18         Body mass index is 34.44 kg/m².    This a well-developed, well nourished patient in no acute distress.  They are alert and oriented and cooperative to examination.  Pt. walks without an antalgic gait.      Examination of the right knee shows no rashes or erythema. There are no masses ecchymosis or effusion. Patient has full range of motion from 0-130°. Patient is nontender to  palpation over lateral joint line and moderately tender to palpation over the medial joint line. Patient has a - Lachman exam, - anterior drawer exam, and - posterior drawer exam. - uJliana's exam. Knee is stable to varus and valgus stress. 5 out of 5 motor strength. Palpable distal pulses. Intact light touch sensation. Negative Patellofemoral crepitus    Examination of the left hand and wrist shows no signs of rashes or erythema. Patient has no masses ecchymosis or effusions. Patient has full range of motion of the wrist in flexion and extension as well as ulnar and radial deviation. The patient also has full range of motion of all joints in the hand. There are 2+ radial pulse and intact light touch sensation in all 5 digits. Nontender over the anatomic snuffbox.  Pain over the CMC joint    Heart is regular rate without obvious murmurs   Normal respiratory effort without audible wheezing  Abdomen is soft and nontender         X-rays:  Four views of the left knee are  reviewed which show well-aligned left total knee arthroplasty components without complication.  Severe medial compartment right knee arthritis     Assessment::  Status post left Evelia CR total knee arthroplasty  Severe right varus knee arthritis  Left CMC arthritis    Plan:   I injected his right knee with Orthovisc 3/3.  We will also go ahead and schedule his total knee replacement.  Plan is for right robotic assisted total knee arthroplasty.  Risks, benefits, and alternatives to the procedure were explained to the patient including but not limited to damage to nerves, arteries, blood vessels, bones, tendons, ligaments, stiffness, instability, infection, DVT, PE, as well as general anesthetic complications including seizure, stroke, heart attack and even death. The patient understood these risks and wished to proceed and signed the informed consent.       This note was created using [employer] voice recognition software that occasionally  misinterpreted phrases or words.

## 2022-10-20 NOTE — PROCEDURES
Large Joint Aspiration/Injection: R knee joint    Date/Time: 10/20/2022 8:30 AM  Performed by: Max Garcia MD  Authorized by: Max Garcia MD     Consent Done?:  Yes (Verbal)  Indications:  Pain  Site marked: the procedure site was marked    Timeout: prior to procedure the correct patient, procedure, and site was verified      Details:  Needle Size:  20 G  Approach:  Anterolateral  Location:  Knee  Site:  R knee joint  Medications:  30 mg sodium hyaluronate (orthovisc) 30 mg/2 mL  Patient tolerance:  Patient tolerated the procedure well with no immediate complications

## 2022-10-24 ENCOUNTER — TELEPHONE (OUTPATIENT)
Dept: PREADMISSION TESTING | Facility: HOSPITAL | Age: 77
End: 2022-10-24
Payer: MEDICARE

## 2022-10-24 NOTE — TELEPHONE ENCOUNTER
----- Message from Kimo Anne sent at 10/24/2022 12:18 PM CDT -----  Regarding: FW: pre op clearance    ----- Message -----  From: Latia Yoon MD  Sent: 10/23/2022   9:54 PM CDT  To: Kimo Anne  Subject: RE: pre op clearance                             Ok thanks  I would just make sure he doesn't stop his flomax  Keep his marie for 2 days afte surgery  And make sure that he voids prior to discharge once marie out   They can chek a bladder scan after he voids to make sure is emptying    ----- Message -----  From: Kimo Anne  Sent: 10/21/2022  11:56 AM CDT  To: Latia Yoon MD  Subject: RE: pre op clearance                             Yes I did.  The patient wanted me to just make you aware that he is having surgery.  He reports that he had some bladder control issues post spinal for his last total knee.     Asa   ----- Message -----  From: Latia Yoon MD  Sent: 10/20/2022   4:55 PM CDT  To: Jessica Campbell LPN, Kimo Anne  Subject: RE: pre op clearance                             Did you mean to send this to urology ?    ----- Message -----  From: Jessica Campbell LPN  Sent: 10/20/2022   4:23 PM CDT  To: Latia Yoon MD  Subject: FW: pre op clearance                               ----- Message -----  From: Kimo Anne  Sent: 10/20/2022   4:19 PM CDT  To: Karrie Jeffery Staff  Subject: pre op clearance                                 Patient was seen today by Dr. Garcia and consented for right total knee arthroplasty.  Please advise on pre op clearance for this patient.  The patient informed me that he had previous issues with spinal anesthesia after last total knee.  I informed him we would not order a spinal for him per his request.  Once clearance has been given from your office and his PCP we will schedule for surgery.      Thank you,     Kimo

## 2022-11-16 ENCOUNTER — TELEPHONE (OUTPATIENT)
Dept: ORTHOPEDICS | Facility: CLINIC | Age: 77
End: 2022-11-16
Payer: MEDICARE

## 2022-11-16 NOTE — TELEPHONE ENCOUNTER
----- Message from Jami Keene MA sent at 11/16/2022 11:43 AM CST -----  Regarding: FW: want to speak to staff about Sx , [phone]  Contact: pt     ----- Message -----  From: Manpreet Pacheco  Sent: 11/16/2022  11:40 AM CST  To: Jose Barbosa Staff  Subject: want to speak to staff about Sx , [phone]    want to speak to staff about Sx , [phone]

## 2022-11-16 NOTE — TELEPHONE ENCOUNTER
Called and spoke to patient. Reports he has appointment with PCP at [facility] on 12/9/22.  He understands that once he has been given clearance we will schedule him for the next available surgery day and this my not be before the end of the year. Patient verbalized understanding.      Asa

## 2023-01-05 ENCOUNTER — PATIENT MESSAGE (OUTPATIENT)
Dept: ORTHOPEDICS | Facility: CLINIC | Age: 78
End: 2023-01-05
Payer: MEDICARE

## 2023-01-05 DIAGNOSIS — M17.11 PRIMARY OSTEOARTHRITIS OF RIGHT KNEE: Primary | ICD-10-CM

## 2023-01-05 DIAGNOSIS — M17.11 OSTEOARTHRITIS OF RIGHT KNEE: ICD-10-CM

## 2023-01-05 DIAGNOSIS — Z01.818 PREOP TESTING: ICD-10-CM

## 2023-01-05 RX ORDER — MUPIROCIN 20 MG/G
OINTMENT TOPICAL
Status: CANCELLED | OUTPATIENT
Start: 2023-01-05

## 2023-01-25 ENCOUNTER — PATIENT MESSAGE (OUTPATIENT)
Dept: ORTHOPEDICS | Facility: CLINIC | Age: 78
End: 2023-01-25
Payer: MEDICARE

## 2023-01-26 ENCOUNTER — PATIENT MESSAGE (OUTPATIENT)
Dept: SURGERY | Facility: HOSPITAL | Age: 78
End: 2023-01-26
Payer: MEDICARE

## 2023-04-24 ENCOUNTER — PATIENT MESSAGE (OUTPATIENT)
Dept: UROLOGY | Facility: CLINIC | Age: 78
End: 2023-04-24

## 2023-04-24 ENCOUNTER — OFFICE VISIT (OUTPATIENT)
Dept: UROLOGY | Facility: CLINIC | Age: 78
End: 2023-04-24
Payer: MEDICARE

## 2023-04-24 VITALS
HEIGHT: 70 IN | HEART RATE: 72 BPM | DIASTOLIC BLOOD PRESSURE: 76 MMHG | SYSTOLIC BLOOD PRESSURE: 109 MMHG | WEIGHT: 216.63 LBS | BODY MASS INDEX: 31.01 KG/M2

## 2023-04-24 DIAGNOSIS — N30.10 INTERSTITIAL CYSTITIS: ICD-10-CM

## 2023-04-24 DIAGNOSIS — N40.0 BENIGN PROSTATIC HYPERPLASIA, UNSPECIFIED WHETHER LOWER URINARY TRACT SYMPTOMS PRESENT: Primary | ICD-10-CM

## 2023-04-24 LAB
BACTERIA #/AREA URNS HPF: ABNORMAL /HPF
BILIRUBIN, UA POC OHS: NEGATIVE
BLOOD, UA POC OHS: NEGATIVE
CLARITY, UA POC OHS: CLEAR
COLOR, UA POC OHS: YELLOW
GLUCOSE, UA POC OHS: NEGATIVE
KETONES, UA POC OHS: NEGATIVE
LEUKOCYTES, UA POC OHS: ABNORMAL
MICROSCOPIC COMMENT: ABNORMAL
NITRITE, UA POC OHS: POSITIVE
PH, UA POC OHS: 5.5
POC RESIDUAL URINE VOLUME: 10 ML (ref 0–100)
PROTEIN, UA POC OHS: NEGATIVE
SPECIFIC GRAVITY, UA POC OHS: 1.02
UROBILINOGEN, UA POC OHS: 1
WBC #/AREA URNS HPF: 8 /HPF (ref 0–5)

## 2023-04-24 PROCEDURE — 81003 URINALYSIS AUTO W/O SCOPE: CPT | Mod: PBBFAC,PN | Performed by: UROLOGY

## 2023-04-24 PROCEDURE — 87077 CULTURE AEROBIC IDENTIFY: CPT | Performed by: UROLOGY

## 2023-04-24 PROCEDURE — 87088 URINE BACTERIA CULTURE: CPT | Performed by: UROLOGY

## 2023-04-24 PROCEDURE — 99214 OFFICE O/P EST MOD 30 MIN: CPT | Mod: S$PBB,,, | Performed by: UROLOGY

## 2023-04-24 PROCEDURE — 99214 PR OFFICE/OUTPT VISIT, EST, LEVL IV, 30-39 MIN: ICD-10-PCS | Mod: S$PBB,,, | Performed by: UROLOGY

## 2023-04-24 PROCEDURE — 99214 OFFICE O/P EST MOD 30 MIN: CPT | Mod: PBBFAC,PN | Performed by: UROLOGY

## 2023-04-24 PROCEDURE — 99999 PR PBB SHADOW E&M-EST. PATIENT-LVL IV: ICD-10-PCS | Mod: PBBFAC,,, | Performed by: UROLOGY

## 2023-04-24 PROCEDURE — 99999 PR PBB SHADOW E&M-EST. PATIENT-LVL IV: CPT | Mod: PBBFAC,,, | Performed by: UROLOGY

## 2023-04-24 PROCEDURE — 87086 URINE CULTURE/COLONY COUNT: CPT | Performed by: UROLOGY

## 2023-04-24 PROCEDURE — 87186 SC STD MICRODIL/AGAR DIL: CPT | Performed by: UROLOGY

## 2023-04-24 PROCEDURE — 51798 US URINE CAPACITY MEASURE: CPT | Mod: PBBFAC,PN | Performed by: UROLOGY

## 2023-04-24 PROCEDURE — 81000 URINALYSIS NONAUTO W/SCOPE: CPT | Performed by: UROLOGY

## 2023-04-24 RX ORDER — TADALAFIL 5 MG/1
5 TABLET ORAL DAILY
Qty: 90 TABLET | Refills: 3 | Status: SHIPPED | OUTPATIENT
Start: 2023-04-24 | End: 2024-03-28

## 2023-04-24 NOTE — PATIENT INSTRUCTIONS
1. Benign prostatic hyperplasia, unspecified whether lower urinary tract symptoms present    2. Interstitial cystitis          Plan:     Continue Cialis 5 mg daily for BPH.  Refilled through [employer].  Okay to use additional up to 20 mg total no more than this 20 mg the 36 hour.  For ED.  Continue Prelief for now but I did send him some information on other supplements such as aloe vera in addition to Q10.  Preferable to use kooaba and he could just start at the maintenance dose since he is maintained with diet and Prelief currently.  If he does end up using aloe vera and Q10 would discontinue the daily Prelief.  Only has 20 tablets of Pyridium left and really rarely has to use it but only uses it if he is spicy foods.  I can refill it as necessary if if he needs more  He can follow up in 1 year, bear bedolla    Aloe Vera from [employer] = 600mg/capsule. 1 month supply $68 (in beginning) eventually that's a 2 month supply          Cheaper option:  For flares 4 capsules twice a day      Aloe Vera Recommended Dosage for Interstitial Cystitis (taken from [employer] [facility])    3 capsules = 1800mg or 600mg/capsule    The recommended dosage for Super-Strength Aloe Vera Capsules     Month One   6 capsules per day (3 in the morning (1800mg) / 3 in the evening (1800mg)). If you are symptom-free after 30 days, continue taking 6 capsules per day for the next 60 days. If you still have symptoms after the first 30 days, move to month two.     Month Two   9 capsules per day (3 in the morning / 3 in the afternoon / 3 in the evening). If you are symptom-free after 60 days, continue taking 9 capsules per day for month three. If you still have symptoms after month two, move to month three.     Month Three   12 capsules per day (4 in the morning / 4 in the afternoon / 4 in the evening). If symptoms do not respond within 3 months while following the 6/9/12 dosage recommendations, we assume that you are one of the 12.5% of  patients who will not respond to aloe vera.     In our database of more than 36,000 IC/PBS customers, those following this recommended dosage had these results at the end of 3 months:     92% self-reported they 'experienced relief' with concentrated oral aloe vera   63% reported substantial improvement in urgency and frequency of urination   69% reported substantial improvement in pelvic pain   68% reported substantial improvement in urethral burning   Symptoms can return if the aloe vera is discontinued     Maintenance Dose after Month Three     The daily dose can be decreased by one capsule per week until symptoms return     week one > take 5 capsules per day   week two > take 4 capsules per day   week three > take 3 capsules per day   When you notice symptoms return, increase the daily dose by one capsule and continue on this maintenance dose.     If you experience a bad day, adding 3 to 6 extra capsules at any time during the day may bring symptoms back under control. It is safe to take 21 capsules or more per day.     It does not matter whether you take the aloe vera capsules with food or without; it works the same either way.     If you experience indigestion when taking aloe vera, try eating something first to prevent this and/or try taking Calcium Glycerophosphate to the aloe.     It is important to drink 8 ounces of liquid with each dose. Do not take the evening dose at bedtime to avoid frequent trips to the bathroom during the night.

## 2023-04-24 NOTE — PROGRESS NOTES
[facility] Note  Staff: MD Karrie  Referring: , TONY Haskins MD      My chart: active    Chief Complaint: Annual Exam      Subjective:        HPI: Manpreet Kerr is a 77 y.o. male     Interval history 3/23/21:  He has a h/o elevated PSA, prostate biopsy and US in in 2008, urinary retention x 2  In 2016, hypogonadism, requesting vas reversal, recurrent uti's.  Cysto trus on 8/12/19 showed b lobe hypertrophy, no stricture. 22g prostate. No IV median lobe. Ctu showed no obvious cause for uti's. Put him on cialis 5mg daily. Residuals improved to 30-50cc with better stream. 10/15/19 UF on tadalafil 5mg daily : avg flow 11, voided vol: 320, pvr by scan: 56. Repeat UF 2/20/20 peak flow 14.3, avg flow 7.9, voided vol: 257, pvr by scan: 25  At last visit 9/2020 he was found to have retention post knee surgery. Asked him to start flomax 0.4mg nightly again and then return for fill and pull. pvr was 25cc  Returns today and states urinating with issues. Took flomax for a few days to urinate last time and it worked but he feels bad when he takes it. Taking tadalafil 5mg once daily. Still on testosterone. No vasovasostomy. He's been using 20mg as needed for ED in addition to the 5mg daily (15mg additional).   AUA ssx:(0 incomplete emptying, 0 frequency, 0 intermittency, 0 urgency, 1 weak stream, 0 straining, 1 sleeping). 2. QOL: delighted  No psa since 11/2019, 0.48 then and he was 72 yo. ua with no blood or eluk No uti's since I last saw him    Interval history 3/28/22:  In 6/2021 and 12/2021 he had some itching, frequency and dysuria. He went to urgent care both times. He was told he had uti each time and he was given antibiotics. He thinks it's due to not drinking a enough. ua today: neg  He says his main issue is his oversensitivity to paprika and bladder which causes frequency, burning and incontinence.  He feels like he has a good urine flow. He is on low dose cialis  5mg for bph. pvr by scan:42. He uses additional cialis for Ed (will take 20 as needed)  On androgel 3 pumps a day/his pcp from [facility] writes for this.     Interval history 4/24/23:  He returns to see me for BPH and ED which is managed with Cialis 5 mg daily with additional bumps up to 15 or 20 mg.  [facility] [employer] [facility]. he is happy on this dose.  His AUA symptom score is 5.  AUA ssx:(1 incomplete emptying, 2 frequency, 0 intermittency, 1 urgency, 0 weak stream, 0 straining, 0 sleeping). 5. QOL: pleased  He is also still on AndroGel 3 pumps a day from primary care at [facility].  And he says his IC is well-controlled with Prelief which he takes every day, 2 a day.  Has not tried any other supplements but did try elimination diet and it showed that paprika, dark sodas and honey all irritate his bladder.  He also had 2 cystoscopies 1 of which showed erythematous bladder and at 1st was going to get a biopsy but a follow-up cystoscopy showed a normalized after using Prelief daily.  However voided urine today does show nitrites and leukocytes.  He denies any urinary frequency urgency cloudy urine or other UTI symptoms.  He has not had a UTI in a while.  I have not seen him in year and no symptoms any year.      Urine history:   4/24/23 Nit+/tr leuk, pvr: 10  3/29/22 Neg, pvr by scan: 42  2/6/20  Tr leuk  10/15/19 neg/100 glucose  8/12/19 Ng, void: n/a  7/30/19 No cx, void:neg,   7/18/19 No cx, void:  tr leuk - on cipro 3rd day  10/16/18 No cx, void: neg, , mycoplasma and urea neg   9/1/18  Multiple org, void: sml leuk, 30-50 wbc  3/8/18  Pvr: 21 (fill a nd pull)   3/2018  Pvr by I&O: 700cc   10/29/17 No cx, void:  Neg  6/2/16  E.coli  6/7/16  Pvr: 11cc  5/31/16 pvr by I&O:  460cc   2/24/15 No cx, void:neg  1/7/15  E.coli res to amp, cipro, lev, tetra, void: neg  10/9/14 No cx, void: neg  4/9/14  No c,x void: neg  3/15/10 Ng, void:  neg  11/6/08 Multiple org    psa history: MGF - had prostate  cancer at a.84  3/23/21 DARRYL: 30g no nodules  8/12/19 Cysto trus 22.2g prostate with mod b lobe hypertrophy and no IV median lobe. pvr by aspiration: 60cc. Recommended urolift.   7/31/19 0.48  7/30/19 DARRYL: 30g, firm on right, pvr by scan: 30cc  7/18/19  pvr: 55cc. UF avg flow 2.9mL/s, voided volume: 37. Pvr: 63cc   10/16/18  pvr: 0  12/15/17 0.458  2016  0.31  2015  0.38  2014  0.5  1/16/12 0.56   1/5/11  0.36  1/6/10  0.30  2008  Negative prostate biopsy       REVIEW OF SYSTEMS:  As above    Allergies:  Adhesive, Codeine, Morphine, and Sulfa (sulfonamide antibiotics)   Sulfa     Anticoagulation: Yes - asa 81mg daily    Objective:     Vitals:    04/24/23 1216   BP: 109/76   Pulse: 72           Assessment:         Manpreet Kerr is a 77 y.o. male       1. Benign prostatic hyperplasia, unspecified whether lower urinary tract symptoms present    2. Interstitial cystitis          Plan:     Continue Cialis 5 mg daily for BPH.  Refilled through [employer].  Okay to use additional up to 20 mg total no more than this 20 mg the 36 hour.  For ED.  Continue Prelief for now but I did send him some information on other supplements such as aloe vera in addition to Q10.  Preferable to use [employer] and he could just start at the maintenance dose since he is maintained with diet and Prelief currently.  If he does end up using aloe vera and Q10 would discontinue the daily Prelief.  Only has 20 tablets of Pyridium left and really rarely has to use it but only uses it if he is spicy foods.  I can refill it as necessary if if he needs more  He can follow up in 1 year, bear ssx      Latia Yoon MD

## 2023-04-26 LAB — BACTERIA UR CULT: ABNORMAL

## 2023-08-14 ENCOUNTER — TELEPHONE (OUTPATIENT)
Dept: DERMATOLOGY | Facility: CLINIC | Age: 78
End: 2023-08-14
Payer: MEDICARE

## 2023-08-14 ENCOUNTER — PATIENT MESSAGE (OUTPATIENT)
Dept: DERMATOLOGY | Facility: CLINIC | Age: 78
End: 2023-08-14
Payer: MEDICARE

## 2023-08-14 NOTE — TELEPHONE ENCOUNTER
----- Message from Karen Valentine sent at 8/14/2023  9:49 AM CDT -----  Regarding: Appt  Contact: [phone]  Patient is calling to schedule an appointment no dates in epic. Please contact pt

## 2023-09-11 ENCOUNTER — PATIENT MESSAGE (OUTPATIENT)
Dept: UROLOGY | Facility: CLINIC | Age: 78
End: 2023-09-11
Payer: MEDICARE

## 2023-09-11 RX ORDER — PHENAZOPYRIDINE HYDROCHLORIDE 200 MG/1
200 TABLET, FILM COATED ORAL 2 TIMES DAILY PRN
Qty: 30 TABLET | Refills: 1 | Status: SHIPPED | OUTPATIENT
Start: 2023-09-11 | End: 2023-09-14 | Stop reason: SDUPTHER

## 2023-09-11 NOTE — TELEPHONE ENCOUNTER
Lab Results   Component Value Date    CREATININE 0.9 08/26/2020     Refilled pyridium to take as needed

## 2023-09-14 RX ORDER — PHENAZOPYRIDINE HYDROCHLORIDE 200 MG/1
200 TABLET, FILM COATED ORAL 2 TIMES DAILY PRN
Qty: 30 TABLET | Refills: 1 | Status: SHIPPED | OUTPATIENT
Start: 2023-09-14 | End: 2023-10-14

## 2023-12-06 ENCOUNTER — TELEPHONE (OUTPATIENT)
Dept: ORTHOPEDICS | Facility: CLINIC | Age: 78
End: 2023-12-06
Payer: MEDICARE

## 2023-12-06 DIAGNOSIS — M17.11 PRIMARY OSTEOARTHRITIS OF RIGHT KNEE: Primary | ICD-10-CM

## 2023-12-06 NOTE — TELEPHONE ENCOUNTER
----- Message from Manpreet Pacheco sent at 12/6/2023  3:59 PM CST -----  Regarding: wants to speak to nurse to set up gel inj, call pt   Contact: pt   wants to speak to nurse to set up inj, call pt

## 2023-12-07 NOTE — TELEPHONE ENCOUNTER
Pt advised oksharon for monovisc and Dr. Garcia would be able to injection thumb as requested. pt appt scheduled and confirmed.

## 2023-12-21 ENCOUNTER — OFFICE VISIT (OUTPATIENT)
Dept: ORTHOPEDICS | Facility: CLINIC | Age: 78
End: 2023-12-21
Payer: MEDICARE

## 2023-12-21 DIAGNOSIS — M17.11 PRIMARY OSTEOARTHRITIS OF RIGHT KNEE: Primary | ICD-10-CM

## 2023-12-21 DIAGNOSIS — M18.12 ARTHRITIS OF CARPOMETACARPAL (CMC) JOINT OF LEFT THUMB: ICD-10-CM

## 2023-12-21 PROCEDURE — 20600 SMALL JOINT ASPIRATION/INJECTION: L THUMB CMC: ICD-10-PCS | Mod: S$PBB,LT,, | Performed by: ORTHOPAEDIC SURGERY

## 2023-12-21 PROCEDURE — 99999 PR PBB SHADOW E&M-EST. PATIENT-LVL I: CPT | Mod: PBBFAC,,, | Performed by: ORTHOPAEDIC SURGERY

## 2023-12-21 PROCEDURE — 99999 PR PBB SHADOW E&M-EST. PATIENT-LVL I: ICD-10-PCS | Mod: PBBFAC,,, | Performed by: ORTHOPAEDIC SURGERY

## 2023-12-21 PROCEDURE — 99999PBSHW PR PBB SHADOW TECHNICAL ONLY FILED TO HB: Mod: PBBFAC,,,

## 2023-12-21 PROCEDURE — 99213 OFFICE O/P EST LOW 20 MIN: CPT | Mod: 25,S$PBB,, | Performed by: ORTHOPAEDIC SURGERY

## 2023-12-21 PROCEDURE — 99211 OFF/OP EST MAY X REQ PHY/QHP: CPT | Mod: PBBFAC,PO | Performed by: ORTHOPAEDIC SURGERY

## 2023-12-21 PROCEDURE — 99999PBSHW PR PBB SHADOW TECHNICAL ONLY FILED TO HB: ICD-10-PCS | Mod: PBBFAC,,,

## 2023-12-21 PROCEDURE — 99213 PR OFFICE/OUTPT VISIT, EST, LEVL III, 20-29 MIN: ICD-10-PCS | Mod: 25,S$PBB,, | Performed by: ORTHOPAEDIC SURGERY

## 2023-12-21 PROCEDURE — 20600 DRAIN/INJ JOINT/BURSA W/O US: CPT | Mod: PBBFAC,PO,LT | Performed by: ORTHOPAEDIC SURGERY

## 2023-12-21 RX ORDER — TRIAMCINOLONE ACETONIDE 40 MG/ML
40 INJECTION, SUSPENSION INTRA-ARTICULAR; INTRAMUSCULAR
Status: DISCONTINUED | OUTPATIENT
Start: 2023-12-21 | End: 2023-12-21 | Stop reason: HOSPADM

## 2023-12-21 RX ADMIN — TRIAMCINOLONE ACETONIDE 40 MG: 40 INJECTION, SUSPENSION INTRA-ARTICULAR; INTRAMUSCULAR at 02:12

## 2023-12-21 RX ADMIN — Medication 88 MG: at 02:12

## 2023-12-21 NOTE — PROCEDURES
Large Joint Aspiration/Injection: R knee joint    Date/Time: 12/21/2023 2:30 PM    Performed by: Max Garcia MD  Authorized by: Max Garcia MD    Consent Done?:  Yes (Verbal)  Indications:  Pain  Site marked: the procedure site was marked    Timeout: prior to procedure the correct patient, procedure, and site was verified      Details:  Needle Size:  18 G  Approach:  Anterolateral  Location:  Knee  Site:  R knee joint  Medications:  88 mg hyaluronate sodium, stabilized 88 mg/4 mL  Patient tolerance:  Patient tolerated the procedure well with no immediate complications

## 2023-12-21 NOTE — PROCEDURES
Small Joint Aspiration/Injection: L thumb CMC    Date/Time: 12/21/2023 2:30 PM    Performed by: Max Garcia MD  Authorized by: Max Garcia MD    Consent Done?:  Yes (Verbal)  Indications:  Pain  Site marked: the procedure site was marked    Timeout: prior to procedure the correct patient, procedure, and site was verified    Prep: patient was prepped and draped in usual sterile fashion      Local anesthesia used?: Yes    Anesthesia:  Local infiltration  Local anesthetic:  Lidocaine 1% without epinephrine and bupivacaine 0.25% without epinephrine  Anesthetic total (ml):  2    Location:  Thumb  Site:  L thumb CMC  Needle size:  22 G  Medications:  40 mg triamcinolone acetonide 40 mg/mL  Patient tolerance:  Patient tolerated the procedure well with no immediate complications

## 2023-12-21 NOTE — PROGRESS NOTES
Past Medical History:   Diagnosis Date    Allergy     Arthritis     BPH with obstruction/lower urinary tract symptoms     Environmental allergies     H/O prostatitis     History of urinary retention     [place] (hard of hearing)     WEARS BILATERAL HEARING AIDS    [place] (hard of hearing)     wears hearing aids    Hypertension     Sleep apnea     Urinary tract infection        Past Surgical History:   Procedure Laterality Date    HERNIA REPAIR      INGUNAL     KNEE ARTHROPLASTY Left 8/25/2020    Procedure: ARTHROPLASTY, KNEE;  Surgeon: Max Garcia MD;  Location: [facility] OR;  Service: Orthopedics;  Laterality: Left;    TRANSRECTAL ULTRASOUND EXAMINATION N/A 8/12/2019    Procedure: TRANSRECTAL ULTRASOUND;  Surgeon: Latia Yoon MD;  [facility];  Service: Urology;  Laterality: N/A;    VASECTOMY      WISDOM TOOTH EXTRACTION         Current Outpatient Medications   Medication Sig    acetaminophen (TYLENOL) 325 MG tablet Take 325 mg by mouth every 6 (six) hours as needed for Pain.    ANDROGEL 20.25 mg/1.25 gram (1.62 %) GlPm     aspirin (ECOTRIN) 81 MG EC tablet Take 81 mg by mouth once daily.    azelastine (ASTELIN) 137 mcg nasal spray 2 sprays (274 mcg total) by Nasal route 2 (two) times daily.    diclofenac sodium (VOLTAREN) 1 % Gel     diphenhydrAMINE (BENADRYL) 50 MG capsule Take 50 mg by mouth every 6 (six) hours as needed for Itching.    EPIPEN 2-KIRIT 0.3 mg/0.3 mL AtIn     fluocinolone (DERMA-SMOOTHE) 0.01 % external oil Apply topically 3 (three) times daily. Apply to damp scalp at bedtime, wash off qam, avoid chronic use.    fluticasone propionate (FLONASE) 50 mcg/actuation nasal spray     hydrOXYzine HCL (ATARAX) 25 MG tablet TK 1 T PO Q 8 H PRN    losartan (COZAAR) 100 MG tablet     montelukast (SINGULAIR) 10 mg tablet TAKE 1 TABLET DAILY    omeprazole (PRILOSEC) 10 MG capsule Take 10 mg by mouth once daily.    simvastatin (ZOCOR) 10 MG tablet     tadalafiL (CIALIS) 5 MG tablet Take 1  tablet (5 mg total) by mouth once daily.    triamcinolone acetonide 0.1% (KENALOG) 0.1 % cream aaa bid x 2 weeks then prn, avoid chronic use    valACYclovir (VALTREX) 1000 MG tablet      No current facility-administered medications for this visit.       Review of patient's allergies indicates:   Allergen Reactions    Adhesive     Codeine Hives    Morphine Hives    Sulfa (sulfonamide antibiotics) Hives and Itching       Family History   Problem Relation Age of Onset    Dementia Mother     Cancer Father         skin    Heart disease Father     Allergic rhinitis Neg Hx     Allergies Neg Hx     Angioedema Neg Hx     Asthma Neg Hx     Atopy Neg Hx     Eczema Neg Hx     Immunodeficiency Neg Hx     Rhinitis Neg Hx     Urticaria Neg Hx        Social History     Socioeconomic History    Marital status:    Tobacco Use    Smoking status: Former     Current packs/day: 0.00     Average packs/day: 1 pack/day for 10.0 years (10.0 ttl pk-yrs)     Types: Cigarettes     Start date: 4/3/1964     Quit date: 4/3/1974     Years since quittin.7    Smokeless tobacco: Never   Substance and Sexual Activity    Alcohol use: No    Drug use: No       Chief Complaint:   No chief complaint on file.        Date of surgery:  2020    History of present illness: 78-year-old male underwent left total knee arthroplasty.  Right knee is been bothering him for the last few weeks. Been hurting for about a month or so now.  Also has some left CMC pain which we have been injecting periodically as well.    Review of Systems:    Musculoskeletal:  See HPI        Physical Examination:    Vital Signs:    There were no vitals filed for this visit.        There is no height or weight on file to calculate BMI.    This a well-developed, well nourished patient in no acute distress.  They are alert and oriented and cooperative to examination.  Pt. walks without an antalgic gait.      Examination of the right knee shows no rashes or erythema. There  are no masses ecchymosis or effusion. Patient has full range of motion from 0-130°. Patient is nontender to palpation over lateral joint line and moderately tender to palpation over the medial joint line. Patient has a - Lachman exam, - anterior drawer exam, and - posterior drawer exam. - Juliana's exam. Knee is stable to varus and valgus stress. 5 out of 5 motor strength. Palpable distal pulses. Intact light touch sensation. Negative Patellofemoral crepitus    Examination of the left hand and wrist shows no signs of rashes or erythema. Patient has no masses ecchymosis or effusions. Patient has full range of motion of the wrist in flexion and extension as well as ulnar and radial deviation. The patient also has full range of motion of all joints in the hand. There are 2+ radial pulse and intact light touch sensation in all 5 digits. Nontender over the anatomic snuffbox.  Pain over the CMC joint      X-rays:  Four views of the left knee are  reviewed which show well-aligned left total knee arthroplasty components without complication.  Severe medial compartment right knee arthritis     Assessment::  Status post left Evelia CR total knee arthroplasty  Severe right varus knee arthritis  Left CMC arthritis    Plan:   I injected his right knee with Monovisc today.  Also injected his left CMC joint.  Follow up as needed.      This note was created using M Modal voice recognition software that occasionally misinterpreted phrases or words.

## 2024-03-28 ENCOUNTER — TELEPHONE (OUTPATIENT)
Dept: HEPATOLOGY | Facility: CLINIC | Age: 79
End: 2024-03-28

## 2024-03-28 ENCOUNTER — OFFICE VISIT (OUTPATIENT)
Dept: HEPATOLOGY | Facility: CLINIC | Age: 79
End: 2024-03-28
Payer: MEDICARE

## 2024-03-28 VITALS
DIASTOLIC BLOOD PRESSURE: 74 MMHG | HEIGHT: 68 IN | BODY MASS INDEX: 35.46 KG/M2 | WEIGHT: 234 LBS | SYSTOLIC BLOOD PRESSURE: 110 MMHG | OXYGEN SATURATION: 96 % | HEART RATE: 73 BPM

## 2024-03-28 DIAGNOSIS — K76.89 HEPATIC CYST: ICD-10-CM

## 2024-03-28 DIAGNOSIS — R10.11 RUQ PAIN: Primary | ICD-10-CM

## 2024-03-28 PROCEDURE — 99999 PR PBB SHADOW E&M-EST. PATIENT-LVL V: CPT | Mod: PBBFAC,,, | Performed by: INTERNAL MEDICINE

## 2024-03-28 PROCEDURE — 99215 OFFICE O/P EST HI 40 MIN: CPT | Mod: PBBFAC,PN | Performed by: INTERNAL MEDICINE

## 2024-03-28 PROCEDURE — 99204 OFFICE O/P NEW MOD 45 MIN: CPT | Mod: S$PBB,,, | Performed by: INTERNAL MEDICINE

## 2024-03-28 RX ORDER — PRAVASTATIN SODIUM 20 MG/1
TABLET ORAL
COMMUNITY
Start: 2024-03-18 | End: 2025-03-18

## 2024-03-28 RX ORDER — UREA 200 MG/G
CREAM TOPICAL
COMMUNITY

## 2024-03-28 RX ORDER — PHENAZOPYRIDINE HYDROCHLORIDE 200 MG/1
TABLET, FILM COATED ORAL
COMMUNITY

## 2024-03-28 RX ORDER — TAMSULOSIN HYDROCHLORIDE 0.4 MG/1
CAPSULE ORAL
COMMUNITY
Start: 2023-09-25 | End: 2024-09-24

## 2024-03-28 RX ORDER — ERGOCALCIFEROL 1.25 MG/1
CAPSULE ORAL
COMMUNITY
Start: 2024-02-05 | End: 2025-02-04

## 2024-03-28 RX ORDER — PRENATAL VIT 91/IRON/FOLIC/DHA 28-975-200
COMBINATION PACKAGE (EA) ORAL 2 TIMES DAILY
COMMUNITY
Start: 2023-12-01

## 2024-03-28 RX ORDER — PREDNISONE 10 MG/1
10 TABLET ORAL EVERY 12 HOURS
COMMUNITY
Start: 2023-12-01

## 2024-03-28 RX ORDER — FOLIC ACID 1 MG/1
TABLET ORAL
COMMUNITY
Start: 2023-06-09 | End: 2024-10-31

## 2024-03-28 RX ORDER — ITRACONAZOLE 100 MG/1
CAPSULE ORAL
COMMUNITY
Start: 2023-12-20 | End: 2024-12-19

## 2024-03-28 RX ORDER — TADALAFIL 5 MG/1
TABLET ORAL
COMMUNITY
Start: 2023-11-28 | End: 2024-04-09 | Stop reason: SDUPTHER

## 2024-03-28 NOTE — PROGRESS NOTES
HEPATOLOGY CONSULTATION    Referring Physician: [facility]  Current Corresponding Physician: [facility], Sundeep Haskins MD     Reason for Consultation: Consultation for evaluation of transiently elevated liver tests and a large liver cyst. Records were not available to review today    History of Present Illness: Manpreet Kerr is a 78 y.o. male with a hx of HTN, BPH, recurrent UTI, elevated BMI and GERD who presents for evaluation of a large liver cyst and transient abnormal liver tests:     --hx RUQ pain- sporadic (4-5 times during his life- first episode after 1992)  --remembers pain in 2010 pain and then not until 1/2024; never saw MD for these episodes of pain  --saw PCP 1/24 but not for pain- saw PCP for annual check - had labs drawn:    01/24 (exact date unclear): , , ALKP 171, Tbil 1.4  --sent to ER the next and had labs and imaging (do not have-but notes suggest an 11 cm hepatic cyst and cholelithiasis)  --hx normal liver tests prior to this: Labs 3/6/2015: ALT 17, AST 16, ALKP 63, Tbil 0.5  --stopped simvastatin 01/24; stopped tylenol; testosterone  --saw general surgeon for possible cholecystectomy- deferred until w/u for other causes of elevated liver tests is completed    1. Hx Hepatic cyst   Large simple-appearing hepatic cyst which has increased gradually in size since at least 2018 now measuring 11 cm.   2. Elevated liver tests: serologic w/u negative including:  Acetaminophen Level, Actin (Smooth Muscle) Ab, AFP Tumor Marker, Alkaline Phosphatase Isoenzyme, Alpha-1-Antitrypsin, DEVORA Screen Panel, Antineutrophil Cytoplasmic Ab, Ceruloplasmin, Ferritin, IgG Subclasses 1-4 Panel, Iron Level and TIBC, Liver-Kidney Microsomal Ab, Mitochondrial (M2) Ab, Phosphatidylethanol (PEth), QuantiFERON-TB Plus, Soluble Liver Ag (IgG Ab)     CT urogram 7/31/19: The liver is of normal size and CT density. Identified in the central right lobe is a 8.9 cm cyst. This  appears increased in size from the prior ultrasound (done 8/4/2008)when it measured 6.7 cm. A 2nd small cyst measures 11 mm.     The patient denies any symptoms of decompensated cirrhosis, including no ascites or edema, cognitive problems that would suggest hepatic encephalopathy, or GI bleeding from varices.     Chief Complaint   Patient presents with    Abnormal Abdominal/Liver Imaging       Past Medical History:   Diagnosis Date    Allergy     Arthritis     BPH with obstruction/lower urinary tract symptoms     Environmental allergies     H/O prostatitis     History of urinary retention     [place] (hard of hearing)     WEARS BILATERAL HEARING AIDS    [place] (hard of hearing)     wears hearing aids    Hypertension     Sleep apnea     Urinary tract infection      Outpatient Encounter Medications as of 3/28/2024   Medication Sig Dispense Refill    aspirin (ECOTRIN) 81 MG EC tablet Take 81 mg by mouth once daily.      azelastine (ASTELIN) 137 mcg nasal spray 2 sprays (274 mcg total) by Nasal route 2 (two) times daily. 90 mL 4    diclofenac sodium (VOLTAREN) 1 % Gel       EPIPEN 2-KIRIT 0.3 mg/0.3 mL AtIn       fluocinolone (DERMA-SMOOTHE) 0.01 % external oil Apply topically 3 (three) times daily. Apply to damp scalp at bedtime, wash off qam, avoid chronic use. 118 Bottle 6    fluticasone propionate (FLONASE) 50 mcg/actuation nasal spray       hydrOXYzine HCL (ATARAX) 25 MG tablet TK 1 T PO Q 8 H PRN      losartan (COZAAR) 100 MG tablet       montelukast (SINGULAIR) 10 mg tablet TAKE 1 TABLET DAILY 90 tablet 1    omeprazole (PRILOSEC) 10 MG capsule Take 10 mg by mouth once daily.      tadalafiL (CIALIS) 5 MG tablet Take 1 tablet (5 mg total) by mouth once daily. 90 tablet 3    triamcinolone acetonide 0.1% (KENALOG) 0.1 % cream aaa bid x 2 weeks then prn, avoid chronic use 454 g 2    acetaminophen (TYLENOL) 325 MG tablet Take 325 mg by mouth every 6 (six) hours as needed for Pain.      ANDROGEL 20.25 mg/1.25 gram (1.62 %) GlPm        diphenhydrAMINE (BENADRYL) 50 MG capsule Take 50 mg by mouth every 6 (six) hours as needed for Itching.      simvastatin (ZOCOR) 10 MG tablet       valACYclovir (VALTREX) 1000 MG tablet        No facility-administered encounter medications on file as of 3/28/2024.     Review of patient's allergies indicates:   Allergen Reactions    Adhesive     Amlodipine benzoate     Codeine Hives    Morphine Hives    Sulfa (sulfonamide antibiotics) Hives and Itching     Family History   Problem Relation Age of Onset    Dementia Mother     Cancer Father         skin    Heart disease Father     Allergic rhinitis Neg Hx     Allergies Neg Hx     Angioedema Neg Hx     Asthma Neg Hx     Atopy Neg Hx     Eczema Neg Hx     Immunodeficiency Neg Hx     Rhinitis Neg Hx     Urticaria Neg Hx        Social History     Socioeconomic History    Marital status:    Tobacco Use    Smoking status: Former     Current packs/day: 0.00     Average packs/day: 1 pack/day for 10.0 years (10.0 ttl pk-yrs)     Types: Cigarettes     Start date: 4/3/1964     Quit date: 4/3/1974     Years since quittin.0    Smokeless tobacco: Never   Substance and Sexual Activity    Alcohol use: No    Drug use: No     Review of Systems   Constitutional: Negative.    HENT: Negative.     Eyes: Negative.    Respiratory: Negative.     Cardiovascular: Negative.    Gastrointestinal: Negative.    Genitourinary: Negative.    Musculoskeletal: Negative.    Skin: Negative.    Neurological: Negative.    Psychiatric/Behavioral: Negative.       Vitals:    24 1353   BP: 110/74   Pulse: 73        Physical Exam  Vitals reviewed.   Constitutional:       Appearance: He is well-developed.   HENT:      Head: Normocephalic and atraumatic.   Eyes:      General: No scleral icterus.     Conjunctiva/sclera: Conjunctivae normal.      Pupils: Pupils are equal, round, and reactive to light.   Neck:      Thyroid: No thyromegaly.   Cardiovascular:      Rate and Rhythm: Normal rate and  regular rhythm.      Heart sounds: Normal heart sounds.   Pulmonary:      Effort: Pulmonary effort is normal.      Breath sounds: Normal breath sounds. No rales.   Abdominal:      General: Bowel sounds are normal. There is no distension.      Palpations: Abdomen is soft. There is no mass.      Tenderness: There is no abdominal tenderness.   Musculoskeletal:         General: Normal range of motion.      Cervical back: Normal range of motion and neck supple.   Skin:     General: Skin is warm and dry.      Findings: No rash.   Neurological:      Mental Status: He is alert and oriented to person, place, and time.         Computed MELD 3.0 unavailable. Necessary lab results were not found in the last year.  Computed MELD-Na unavailable. Necessary lab results were not found in the last year.      Lab Results   Component Value Date     (H) 08/26/2020    BUN 12 08/26/2020    CREATININE 0.9 08/26/2020    CALCIUM 8.2 (L) 08/26/2020     08/26/2020    K 4.0 08/26/2020     08/26/2020    PROT 6.9 03/06/2015    CO2 22 (L) 08/26/2020    ANIONGAP 8 08/26/2020    WBC 8.56 08/26/2020    RBC 4.49 (L) 08/26/2020    HGB 14.0 08/26/2020    HCT 42.9 08/26/2020    MCV 96 08/26/2020    MCH 31.2 (H) 08/26/2020    MCHC 32.6 08/26/2020     Lab Results   Component Value Date    RDW 13.7 08/26/2020     (L) 08/26/2020    MPV 10.9 08/26/2020    GRAN 6.9 08/26/2020    GRAN 80.3 (H) 08/26/2020    LYMPH 1.1 08/26/2020    LYMPH 12.9 (L) 08/26/2020    MONO 0.6 08/26/2020    MONO 6.4 08/26/2020    EOSINOPHIL 0.0 08/26/2020    BASOPHIL 0.2 08/26/2020    EOS 0.0 08/26/2020    BASO 0.02 08/26/2020    GROUPTRH O POS 08/13/2020    CHOL 152 03/06/2015    TRIG 107 03/06/2015    HDL 32 (L) 03/06/2015    CHOLHDL 21.1 03/06/2015    TOTALCHOLEST 4.8 03/06/2015    ALBUMIN 3.4 (L) 03/06/2015    AST 16 03/06/2015    ALT 17 03/06/2015    ALKPHOS 63 03/06/2015    PSA 0.48 07/31/2019       Assessment and Plan:  Manpreet Kerr is a 78 y.o.  male with an enlarging liver cyst and transiently elevated liver tests. Liver tests were elevated and done shortly after an episode of recurrent RUQ pain. Liver tests prior to this and now are normal. Serologic w/u neg for other causes of elevated liver tests. Imaging shows in addition to a large simple cyst, cholelithiasis. Simvastation, testosterone and tylenol were discontinued. I think it likely that he had symptomatic cholelithiasis since he had associated RUQ pain, which has been episodic and has been shown to have cholelithiasis. In additon, liver tests were quite elevated and normalized quickly.The hepatic cyst, although could cause pain, would be unlikely to affect transiently liver enzymes. DILI unlikely since DILI generally is not associated with RUQ pain. For the time being I do recommend he continue to stay off simvastatin, tylenol and testosterone. I will review his films and will obtain full records. I will call the patient next week after radiology conference and make further recommendations.    F/u depends on review of radiology.  A total of 35 minutes was spent reviewing the patient's chart, examining the patient, reviewing labs and imaging and coordinating care with the patient's care team.

## 2024-03-28 NOTE — PATIENT INSTRUCTIONS
Records from [facility] starting 01/24 including labs, office visits radiology report  Will load films into our system and review April 9 vs April 16th  My initial thought is that you passed a gallstone; also possible is simvastatin and less likely is the liver cyst  [phone]  F/u will depend on review of films

## 2024-03-31 PROBLEM — K76.89 HEPATIC CYST: Status: ACTIVE | Noted: 2024-03-31

## 2024-03-31 PROBLEM — R10.11 RUQ PAIN: Status: ACTIVE | Noted: 2024-03-31

## 2024-04-01 ENCOUNTER — TELEPHONE (OUTPATIENT)
Dept: HEPATOLOGY | Facility: CLINIC | Age: 79
End: 2024-04-01
Payer: MEDICARE

## 2024-04-01 DIAGNOSIS — K76.89 HEPATIC CYST: Primary | ICD-10-CM

## 2024-04-01 NOTE — TELEPHONE ENCOUNTER
Patient: Manpreet Kerr       MRN: [id number]      : 1945     Age: 78 y.o.  [address]  [place] [address]    Presenting Radiologists:     Providers: Mone Alvarez MD    Priority of review: Benign disease    Patient Transplant Status: Other no need for transplant    Reason for presentation: Other determine source of episodic RUQ pain and transiently elevated liver tests    Clinical Summary: Manpreet Kerr is a 78 y.o. male with a hx of HTN, BPH, recurrent UTI, elevated BMI and GERD who presents for evaluation of a large liver cyst and transient abnormal liver tests:     --hx RUQ pain- sporadic (4-5 times during his life- first episode after )  --remembers pain in  pain and then not until 2024; never saw MD for these episodes of pain  --saw PCP  but not for pain- saw PCP for annual check - had labs drawn:     (exact date unclear): , , ALKP 171, Tbil 1.4  --sent to ER the next and had labs and imaging (do not have-but notes suggest an 11 cm hepatic cyst and cholelithiasis)  --hx normal liver tests prior to this: Labs 3/6/2015: ALT 17, AST 16, ALKP 63, Tbil 0.5  --stopped simvastatin ; stopped tylenol; testosterone  --saw general surgeon for possible cholecystectomy- deferred until w/u for other causes of elevated liver tests is completed    1. Hx Hepatic cyst   Large simple-appearing hepatic cyst which has increased gradually in size since at least 2018 now measuring 11 cm.   2. Elevated liver tests: serologic w/u negative including:  Acetaminophen Level, Actin (Smooth Muscle) Ab, AFP Tumor Marker, Alkaline Phosphatase Isoenzyme, Alpha-1-Antitrypsin, DEVORA Screen Panel, Antineutrophil Cytoplasmic Ab, Ceruloplasmin, Ferritin, IgG Subclasses 1-4 Panel, Iron Level and TIBC, Liver-Kidney Microsomal Ab, Mitochondrial (M2) Ab, Phosphatidylethanol (PEth), QuantiFERON-TB Plus, Soluble Liver Ag (IgG Ab)     CT urogram 19: The liver is of normal size and  "CT density. Identified in the central right lobe is a 8.9 cm cyst. This appears increased in size from the prior ultrasound (done 8/4/2008)when it measured 6.7 cm. A 2nd small cyst measures 11 mm.     The patient denies any symptoms of decompensated cirrhosis, including no ascites or edema, cognitive problems that would suggest hepatic encephalopathy, or GI bleeding from varices.     Imaging to be reviewed: imaging from OSH (abdo US and CT scan)    HCC Treatment History: n/a    Platelets:   Lab Results   Component Value Date/Time     (L) 08/26/2020 05:38 AM     Creatinine:   Lab Results   Component Value Date/Time    CREATININE 0.9 08/26/2020 05:38 AM     Bilirubin:   Lab Results   Component Value Date/Time    BILITOT 0.5 03/06/2015 09:01 AM     AFP Last 3 each if available: No results found for: "AFP", "EXTAFP"    MELD: Computed MELD 3.0 unavailable. Necessary lab results were not found in the last year.  Computed MELD-Na unavailable. Necessary lab results were not found in the last year.      Plan:     Follow-up Provider:   "

## 2024-04-02 ENCOUNTER — TELEPHONE (OUTPATIENT)
Dept: HEPATOLOGY | Facility: CLINIC | Age: 79
End: 2024-04-02
Payer: MEDICARE

## 2024-04-02 ENCOUNTER — TELEPHONE (OUTPATIENT)
Dept: TRANSPLANT | Facility: CLINIC | Age: 79
End: 2024-04-02
Payer: MEDICARE

## 2024-04-02 NOTE — TELEPHONE ENCOUNTER
Call to pt re radiology review. Has large simple cyst-unlikely responsible for episodic RUQ pain or elevated liver tests. More likely related to symptomatic cholelithiasis. Agree with considering cholecystectomy. No need to intervene on cyst unless develops constant pain/fevers.

## 2024-04-04 ENCOUNTER — HOSPITAL ENCOUNTER (OUTPATIENT)
Dept: RADIOLOGY | Facility: HOSPITAL | Age: 79
Discharge: HOME | End: 2024-04-04
Attending: ORTHOPAEDIC SURGERY
Payer: MEDICARE

## 2024-04-04 ENCOUNTER — OFFICE VISIT (OUTPATIENT)
Dept: ORTHOPEDICS | Facility: CLINIC | Age: 79
End: 2024-04-04
Payer: MEDICARE

## 2024-04-04 VITALS — WEIGHT: 233.94 LBS | BODY MASS INDEX: 35.45 KG/M2 | HEIGHT: 68 IN

## 2024-04-04 DIAGNOSIS — M18.0 ARTHRITIS OF CARPOMETACARPAL (CMC) JOINTS OF BOTH THUMBS: Primary | ICD-10-CM

## 2024-04-04 DIAGNOSIS — M79.646 THUMB PAIN, UNSPECIFIED LATERALITY: Primary | ICD-10-CM

## 2024-04-04 DIAGNOSIS — M79.646 THUMB PAIN, UNSPECIFIED LATERALITY: ICD-10-CM

## 2024-04-04 PROCEDURE — 20600 DRAIN/INJ JOINT/BURSA W/O US: CPT | Mod: 50,PBBFAC,PO | Performed by: ORTHOPAEDIC SURGERY

## 2024-04-04 PROCEDURE — 99212 OFFICE O/P EST SF 10 MIN: CPT | Mod: PBBFAC,25,PO | Performed by: ORTHOPAEDIC SURGERY

## 2024-04-04 PROCEDURE — 99999PBSHW PR PBB SHADOW TECHNICAL ONLY FILED TO HB: Mod: PBBFAC,,,

## 2024-04-04 PROCEDURE — 99214 OFFICE O/P EST MOD 30 MIN: CPT | Mod: 25,S$PBB,, | Performed by: ORTHOPAEDIC SURGERY

## 2024-04-04 PROCEDURE — 73130 X-RAY EXAM OF HAND: CPT | Mod: 26,50,, | Performed by: RADIOLOGY

## 2024-04-04 PROCEDURE — 73130 X-RAY EXAM OF HAND: CPT | Mod: TC,50,PO

## 2024-04-04 PROCEDURE — 99999 PR PBB SHADOW E&M-EST. PATIENT-LVL II: CPT | Mod: PBBFAC,,, | Performed by: ORTHOPAEDIC SURGERY

## 2024-04-04 RX ORDER — TRIAMCINOLONE ACETONIDE 40 MG/ML
40 INJECTION, SUSPENSION INTRA-ARTICULAR; INTRAMUSCULAR
Status: DISCONTINUED | OUTPATIENT
Start: 2024-04-04 | End: 2024-04-04 | Stop reason: HOSPADM

## 2024-04-04 RX ADMIN — TRIAMCINOLONE ACETONIDE 40 MG: 40 INJECTION, SUSPENSION INTRA-ARTICULAR; INTRAMUSCULAR at 03:04

## 2024-04-04 NOTE — PROGRESS NOTES
Past Medical History:   Diagnosis Date    Allergy     Arthritis     BPH with obstruction/lower urinary tract symptoms     Environmental allergies     H/O prostatitis     Hepatic cyst 03/31/2024    History of urinary retention     [place] (hard of hearing)     WEARS BILATERAL HEARING AIDS    [place] (hard of hearing)     wears hearing aids    Hypertension     RUQ pain 03/31/2024    Sleep apnea     Urinary tract infection        Past Surgical History:   Procedure Laterality Date    HERNIA REPAIR      INGUNAL     KNEE ARTHROPLASTY Left 8/25/2020    Procedure: ARTHROPLASTY, KNEE;  Surgeon: Max Garcia MD;  Location: [facility] OR;  Service: Orthopedics;  Laterality: Left;    TRANSRECTAL ULTRASOUND EXAMINATION N/A 8/12/2019    Procedure: TRANSRECTAL ULTRASOUND;  Surgeon: Latia Yoon MD;  Location: [facility] OR;  Service: Urology;  Laterality: N/A;    VASECTOMY      WISDOM TOOTH EXTRACTION         Current Outpatient Medications   Medication Sig    aspirin (ECOTRIN) 81 MG EC tablet Take 81 mg by mouth once daily.    azelastine (ASTELIN) 137 mcg nasal spray 2 sprays (274 mcg total) by Nasal route 2 (two) times daily.    diclofenac sodium (VOLTAREN) 1 % Gel     EPIPEN 2-KIRIT 0.3 mg/0.3 mL AtIn     ergocalciferol (ERGOCALCIFEROL) 50,000 unit Cap     fluocinolone (DERMA-SMOOTHE) 0.01 % external oil Apply topically 3 (three) times daily. Apply to damp scalp at bedtime, wash off qam, avoid chronic use.    fluticasone propionate (FLONASE) 50 mcg/actuation nasal spray     folic acid (FOLVITE) 1 MG tablet     hydrOXYzine HCL (ATARAX) 25 MG tablet TK 1 T PO Q 8 H PRN    itraconazole (SPORANOX) 100 mg Cap Finish the prescription.Take with food/milk.    losartan (COZAAR) 100 MG tablet     montelukast (SINGULAIR) 10 mg tablet TAKE 1 TABLET DAILY    omeprazole (PRILOSEC) 10 MG capsule Take 10 mg by mouth once daily.    phenazopyridine (PYRIDIUM) 200 MG tablet TAKE 1 TABLET BY MOUTH THREE TIMES DAILY AS NEEDED FOR FOR PAIN     pravastatin (PRAVACHOL) 20 MG tablet Take with food/milk.Take or use exactly as directed.Obtain advice for OTCs.Do not take if pregnant.    predniSONE (DELTASONE) 10 MG tablet Take 10 mg by mouth every 12 (twelve) hours.    tadalafiL (CIALIS) 5 MG tablet Obtain advice for OTCs.Avoid nitrates.    tamsulosin (FLOMAX) 0.4 mg Cap May cause drowsiness.Be careful if taking OTCs.Take or use exactly as directed.Swallow whole.    terbinafine HCL (LAMISIL) 1 % cream Apply topically 2 (two) times daily.    triamcinolone acetonide 0.1% (KENALOG) 0.1 % cream aaa bid x 2 weeks then prn, avoid chronic use    urea 20 % Crea      No current facility-administered medications for this visit.       Review of patient's allergies indicates:   Allergen Reactions    Adhesive     Amlodipine benzoate     Codeine Hives    Morphine Hives    Sulfa (sulfonamide antibiotics) Hives and Itching       Family History   Problem Relation Age of Onset    Dementia Mother     Cancer Father         skin    Heart disease Father     Allergic rhinitis Neg Hx     Allergies Neg Hx     Angioedema Neg Hx     Asthma Neg Hx     Atopy Neg Hx     Eczema Neg Hx     Immunodeficiency Neg Hx     Rhinitis Neg Hx     Urticaria Neg Hx        Social History     Socioeconomic History    Marital status:    Tobacco Use    Smoking status: Former     Current packs/day: 0.00     Average packs/day: 1 pack/day for 10.0 years (10.0 ttl pk-yrs)     Types: Cigarettes     Start date: 4/3/1964     Quit date: 4/3/1974     Years since quittin.0    Smokeless tobacco: Never   Substance and Sexual Activity    Alcohol use: No    Drug use: No       Chief Complaint:   No chief complaint on file.        Date of surgery:  2020    History of present illness: 78-year-old male underwent left total knee arthroplasty.  He is having some bilateral CMC pain which we have been injecting periodically as well.    Review of Systems:  Musculoskeletal:  See HPI        Physical  Examination:    Vital Signs:    There were no vitals filed for this visit.        There is no height or weight on file to calculate BMI.    This a well-developed, well nourished patient in no acute distress.  They are alert and oriented and cooperative to examination.  Pt. walks without an antalgic gait.      Examination of the right knee shows no rashes or erythema. There are no masses ecchymosis or effusion. Patient has full range of motion from 0-130°. Patient is nontender to palpation over lateral joint line and moderately tender to palpation over the medial joint line. Patient has a - Lachman exam, - anterior drawer exam, and - posterior drawer exam. - Juliana's exam. Knee is stable to varus and valgus stress. 5 out of 5 motor strength. Palpable distal pulses. Intact light touch sensation. Negative Patellofemoral crepitus    Examination of both hands and wrist shows no signs of rashes or erythema. Patient has no masses ecchymosis or effusions. Patient has full range of motion of the wrist in flexion and extension as well as ulnar and radial deviation. The patient also has full range of motion of all joints in the hand. There are 2+ radial pulse and intact light touch sensation in all 5 digits. Nontender over the anatomic snuffbox.  Pain over the CMC joint      X-rays:  Four views of the left knee are  reviewed which show well-aligned left total knee arthroplasty components without complication.  Severe medial compartment right knee arthritis  X-rays of both hands are ordered and reviewed which show bilateral CMC arthritis.  Left worse than right.     Assessment::  Status post left [place] CR total knee arthroplasty  Severe right varus knee arthritis  Bilateral CMC arthritis    Plan:   I injected both CMC joints with steroid today.  Got him a CMC brace.      This note was created using [employer] voice recognition software that occasionally misinterpreted phrases or words.

## 2024-04-04 NOTE — PROCEDURES
Small Joint Aspiration/Injection: R thumb CMC, L thumb CMC    Date/Time: 4/4/2024 3:30 PM    Performed by: Max Garcia MD  Authorized by: Max Garcia MD    Consent Done?:  Yes (Verbal)  Indications:  Pain  Site marked: the procedure site was marked    Timeout: prior to procedure the correct patient, procedure, and site was verified    Prep: patient was prepped and draped in usual sterile fashion      Local anesthesia used?: Yes    Anesthesia:  Local infiltration  Local anesthetic:  Lidocaine 1% without epinephrine and bupivacaine 0.25% without epinephrine  Anesthetic total (ml):  2    Location:  Thumb  Site:  R thumb CMC and L thumb CMC  Needle size:  22 G  Medications:  40 mg triamcinolone acetonide 40 mg/mL; 40 mg triamcinolone acetonide 40 mg/mL  Patient tolerance:  Patient tolerated the procedure well with no immediate complications    
detailed exam

## 2024-04-05 ENCOUNTER — TELEPHONE (OUTPATIENT)
Dept: HEPATOLOGY | Facility: CLINIC | Age: 79
End: 2024-04-05
Payer: MEDICARE

## 2024-04-05 NOTE — TELEPHONE ENCOUNTER
"----- Message from Josh Zavala sent at 4/5/2024 12:01 PM CDT -----  Regarding: call back  Consult/Advisory:        Name Of Caller: Self     Contact Preference?:[phone]     What is the nature of the call?: Calling to speak w/ someone in regards to a fax he sent over requesting call back      Additional Notes:  "Thank you for all that you do for our patients"         "

## 2024-04-05 NOTE — TELEPHONE ENCOUNTER
Call returned to the patient at [phone].  Patient asked if we received a fax from him.  Patient stated when he came to the Office Visit with Dr Alvarez he was suppose to bring those papers in.    Patient informed we did receive the fax.  The fax is a referral from [facility] and for records from the visit to be faxed to Dr Lennox Bailey.    Do you have papers you wanted Dr Alvarez to sign? Pt stated No. I just wanted my records to be sent to Dr Lennox Bailey at [facility].    Patient informed that you had your Office Visit with Dr Alvarez on 3/28 and Dr Alvarez  [facility] 3/31/24.    Any additional info after the IR Conference can be sent upon completion to Dr Bailey.  Patient voiced understanding.    Referral scanned into media.  I will send a message to Radha to let her know it is there.

## 2024-04-08 ENCOUNTER — TELEPHONE (OUTPATIENT)
Dept: HEPATOLOGY | Facility: CLINIC | Age: 79
End: 2024-04-08
Payer: MEDICARE

## 2024-04-08 NOTE — TELEPHONE ENCOUNTER
Faxed over request for more lab and testing and hx information from Dr. Bailey and sent Mr Kerr office visit info

## 2024-04-09 ENCOUNTER — OFFICE VISIT (OUTPATIENT)
Dept: UROLOGY | Facility: CLINIC | Age: 79
End: 2024-04-09
Payer: MEDICARE

## 2024-04-09 DIAGNOSIS — N30.10 INTERSTITIAL CYSTITIS: ICD-10-CM

## 2024-04-09 DIAGNOSIS — N40.0 BENIGN PROSTATIC HYPERPLASIA, UNSPECIFIED WHETHER LOWER URINARY TRACT SYMPTOMS PRESENT: Primary | ICD-10-CM

## 2024-04-09 DIAGNOSIS — N52.9 ERECTILE DYSFUNCTION, UNSPECIFIED ERECTILE DYSFUNCTION TYPE: ICD-10-CM

## 2024-04-09 PROCEDURE — 99214 OFFICE O/P EST MOD 30 MIN: CPT | Mod: S$PBB,,, | Performed by: NURSE PRACTITIONER

## 2024-04-09 PROCEDURE — 99213 OFFICE O/P EST LOW 20 MIN: CPT | Mod: PBBFAC,PO | Performed by: NURSE PRACTITIONER

## 2024-04-09 PROCEDURE — 99999 PR PBB SHADOW E&M-EST. PATIENT-LVL III: CPT | Mod: PBBFAC,,, | Performed by: NURSE PRACTITIONER

## 2024-04-09 RX ORDER — TADALAFIL 5 MG/1
TABLET ORAL
Qty: 90 TABLET | Refills: 3 | Status: SHIPPED | OUTPATIENT
Start: 2024-04-09

## 2024-04-09 NOTE — PROGRESS NOTES
[facility] Note  Staff: FUNMI Avila    PCP: None  Urologist:  MD Karrie    Chief Complaint: Annual medication refills    Subjective:        HPI: Manpreet Kerr is a 78 y.o. male presents today for annual f/up and medication refill.     HX:  Interval history 3/23/21:  He has a h/o elevated PSA, prostate biopsy and US in in 2008, urinary retention x 2  In 2016, hypogonadism, requesting vas reversal, recurrent uti's.  Cysto trus on 8/12/19 showed b lobe hypertrophy, no stricture. 22g prostate. No IV median lobe. Ctu showed no obvious cause for uti's. Put him on cialis 5mg daily. Residuals improved to 30-50cc with better stream. 10/15/19 UF on tadalafil 5mg daily : avg flow 11, voided vol: 320, pvr by scan: 56. Repeat UF 2/20/20 peak flow 14.3, avg flow 7.9, voided vol: 257, pvr by scan: 25  At last visit 9/2020 he was found to have retention post knee surgery. Asked him to start flomax 0.4mg nightly again and then return for fill and pull. pvr was 25cc  Returns today and states urinating with issues. Took flomax for a few days to urinate last time and it worked but he feels bad when he takes it. Taking tadalafil 5mg once daily. Still on testosterone. No vasovasostomy. He's been using 20mg as needed for ED in addition to the 5mg daily (15mg additional).   AUA ssx:(0 incomplete emptying, 0 frequency, 0 intermittency, 0 urgency, 1 weak stream, 0 straining, 1 sleeping). 2. QOL: delighted  No psa since 11/2019, 0.48 then and he was 72 yo. ua with no blood or eluk No uti's since I last saw him     Interval history 3/28/22:  In 6/2021 and 12/2021 he had some itching, frequency and dysuria. He went to urgent care both times. He was told he had uti each time and he was given antibiotics. He thinks it's due to not drinking a enough. ua today: neg  He says his main issue is his oversensitivity to paprika and bladder which causes frequency, burning and incontinence.  He feels like he  has a good urine flow. He is on low dose cialis 5mg for bph. pvr by scan:42. He uses additional cialis for Ed (will take 20 as needed)  On androgel 3 pumps a day/his pcp from [facility] writes for this.      Interval history 4/24/23:  He returns to see me for BPH and ED which is managed with Cialis 5 mg daily with additional bumps up to 15 or 20 mg.  [facility]. he is happy on this dose.  His AUA symptom score is 5.  AUA ssx:(1 incomplete emptying, 2 frequency, 0 intermittency, 1 urgency, 0 weak stream, 0 straining, 0 sleeping). 5. QOL: pleased  He is also still on AndroGel 3 pumps a day from primary care at [facility].  And he says his IC is well-controlled with Prelief which he takes every day, 2 a day.  Has not tried any other supplements but did try elimination diet and it showed that paprika, dark sodas and honey all irritate his bladder.  He also had 2 cystoscopies 1 of which showed erythematous bladder and at 1st was going to get a biopsy but a follow-up cystoscopy showed a normalized after using Prelief daily.  However voided urine today does show nitrites and leukocytes.  He denies any urinary frequency urgency cloudy urine or other UTI symptoms.  He has not had a UTI in a while.  I have not seen him in year and no symptoms any year.     Interval history 4/9/2024:  He returns today for annual f/up and medication refills.  Pt with hx of BPH and ED which is managed with Cialis 5 mg daily with additional bumps up to 15 or 20 mg.  He [facility]. he is happy on this dose.  His AUA symptom score is 5.  AUA ssx:(1 incomplete emptying, 2 frequency, 0 intermittency, 1 urgency, 0 weak stream, 0 straining, 0 sleeping). 5. QOL: pleased  He is also still on AndroGel 3 pumps a day from primary care at [facility].  And he says his IC is well-controlled with Prelief and Aloe-Vera at this time which he takes every day, 2 a day.  Has not tried any other  supplements but did try elimination diet and it showed that paprika, dark sodas and honey all irritate his bladder.  He also had 2 cystoscopies 1 of which showed erythematous bladder and at 1st was going to get a biopsy but a follow-up cystoscopy showed a normalized after using Prelief daily.    Urine history:   4/24/23            Nit+/tr leuk, pvr: 10  3/29/22            Neg, pvr by scan: 42  2/6/20              Tr leuk  10/15/19          neg/100 glucose  8/12/19            Ng, void: n/a  7/30/19            No cx, void:neg,   7/18/19            No cx, void:  tr leuk - on cipro 3rd day  10/16/18          No cx, void: neg, , mycoplasma and urea neg   9/1/18              Multiple org, void: sml leuk, 30-50 wbc  3/8/18              Pvr: 21 (fill a nd pull)   3/2018             Pvr by I&O: 700cc   10/29/17          No cx, void:  Neg  6/2/16              E.coli  6/7/16              Pvr: 11cc  5/31/16            pvr by I&O:  460cc   2/24/15            No cx, void:neg  1/7/15              E.coli res to amp, cipro, lev, tetra, void: neg  10/9/14            No cx, void: neg  4/9/14              No c,x void: neg  3/15/10            Ng, void:  neg  11/6/08            Multiple org     psa history: MGF - had prostate cancer at a.84  3/23/21            DARRYL: 30g no nodules  8/12/19            Cysto trus 22.2g prostate with mod b lobe hypertrophy and no IV median lobe. pvr by aspiration: 60cc. Recommended urolift.   7/31/19            0.48  7/30/19            DARRYL: 30g, firm on right, pvr by scan: 30cc  7/18/19            pvr: 55cc. UF avg flow 2.9mL/s, voided volume: 37. Pvr: 63cc   10/16/18           pvr: 0  12/15/17          0.458  2016                0.31  2015                0.38  2014                0.5  1/16/12            0.56       1/5/11              0.36  1/6/10              0.30  2008                Negative prostate biopsy         REVIEW OF SYSTEMS:  As above     Allergies:  Adhesive, Codeine, Morphine, and Sulfa  (sulfonamide antibiotics)   Sulfa      Anticoagulation: Yes - asa 81mg daily    PMHx:  Past Medical History:   Diagnosis Date    Allergy     Arthritis     BPH with obstruction/lower urinary tract symptoms     Environmental allergies     H/O prostatitis     Hepatic cyst 03/31/2024    History of urinary retention     [place] (hard of hearing)     WEARS BILATERAL HEARING AIDS    [place] (hard of hearing)     wears hearing aids    Hypertension     RUQ pain 03/31/2024    Sleep apnea     Urinary tract infection        PSHx:  Past Surgical History:   Procedure Laterality Date    HERNIA REPAIR      INGUNAL     KNEE ARTHROPLASTY Left 8/25/2020    Procedure: ARTHROPLASTY, KNEE;  Surgeon: Max Garcia MD;  Location: [facility] OR;  Service: Orthopedics;  Laterality: Left;    TRANSRECTAL ULTRASOUND EXAMINATION N/A 8/12/2019    Procedure: TRANSRECTAL ULTRASOUND;  Surgeon: Latia Yoon MD;  Location: [facility] OR;  Service: Urology;  Laterality: N/A;    VASECTOMY      WISDOM TOOTH EXTRACTION         Allergies:  Adhesive, Amlodipine benzoate, Codeine, Morphine, and Sulfa (sulfonamide antibiotics)    Medications: reviewed     Objective:   There were no vitals filed for this visit.    General:WDWN in NAD  Eyes: PERRLA, normal conjunctiva  Respiratory: no increased work on breathing, clear to auscultation  Cardiovascular: regular rate and rhythm. No obvious extremity edema.  GI: palpation of masses. No tenderness. No hepatosplenomegaly to palpation.  Musculoskeletal: normal range of motion of bilateral upper extremities. Normal muscle strength and tone.  Skin: no obvious rashes or lesions. No tightening of skin noted.  Neurologic: CN grossly normal. Normal sensation.   Psychiatric: awake, alert and oriented x 3. Mood and affect normal. Cooperative.        Assessment:       1. Benign prostatic hyperplasia, unspecified whether lower urinary tract symptoms present    2. Interstitial cystitis    3. Erectile dysfunction, unspecified  erectile dysfunction type          Plan:     --Continue Cialis 5 mg daily for BPH.  Refilled through [facility].  Okay to use additional up to 20 mg total no more than this 20 mg the 36 hour.  For ED.    --Continue Prelief and Aloe Vera at this time.  Preferable to use [place] and he could just start at the maintenance dose since he is maintained with diet and Prelief currently.  If he does end up using aloe vera and Q10 would discontinue the daily Prelief.    --He can follow up in 1 year, bear Hensley, TRAVISP-C

## 2024-07-23 DIAGNOSIS — M17.11 PRIMARY OSTEOARTHRITIS OF RIGHT KNEE: Primary | ICD-10-CM

## 2024-07-29 ENCOUNTER — OFFICE VISIT (OUTPATIENT)
Dept: ORTHOPEDICS | Facility: CLINIC | Age: 79
End: 2024-07-29
Payer: MEDICARE

## 2024-07-29 VITALS — HEIGHT: 68 IN | BODY MASS INDEX: 35.42 KG/M2 | RESPIRATION RATE: 18 BRPM | WEIGHT: 233.69 LBS

## 2024-07-29 DIAGNOSIS — M17.11 PRIMARY OSTEOARTHRITIS OF RIGHT KNEE: Primary | ICD-10-CM

## 2024-07-29 PROCEDURE — 99999PBSHW PR PBB SHADOW TECHNICAL ONLY FILED TO HB: Mod: JZ,PBBFAC,,

## 2024-07-29 PROCEDURE — 99999 PR PBB SHADOW E&M-EST. PATIENT-LVL II: CPT | Mod: PBBFAC,,, | Performed by: ORTHOPAEDIC SURGERY

## 2024-07-29 PROCEDURE — 99212 OFFICE O/P EST SF 10 MIN: CPT | Mod: PBBFAC,PO | Performed by: ORTHOPAEDIC SURGERY

## 2024-07-29 RX ADMIN — Medication 88 MG: at 10:07

## 2024-07-29 NOTE — PROCEDURES
Large Joint Aspiration/Injection: R knee joint    Date/Time: 7/29/2024 10:15 AM    Performed by: Max Garcia MD  Authorized by: Max Garcia MD    Consent Done?:  Yes (Verbal)  Indications:  Pain  Site marked: the procedure site was marked    Timeout: prior to procedure the correct patient, procedure, and site was verified      Details:  Needle Size:  18 G  Approach:  Anterolateral  Location:  Knee  Site:  R knee joint  Medications:  88 mg hyaluronate sodium, stabilized 88 mg/4 mL  Patient tolerance:  Patient tolerated the procedure well with no immediate complications

## 2024-07-29 NOTE — PROGRESS NOTES
Past Medical History:   Diagnosis Date    Allergy     Arthritis     BPH with obstruction/lower urinary tract symptoms     Environmental allergies     H/O prostatitis     Hepatic cyst 03/31/2024    History of urinary retention     [place] (hard of hearing)     WEARS BILATERAL HEARING AIDS    [place] (hard of hearing)     wears hearing aids    Hypertension     RUQ pain 03/31/2024    Sleep apnea     Urinary tract infection        Past Surgical History:   Procedure Laterality Date    HERNIA REPAIR      INGUNAL     KNEE ARTHROPLASTY Left 8/25/2020    Procedure: ARTHROPLASTY, KNEE;  Surgeon: Max Garcia MD;  Location: [facility] OR;  Service: Orthopedics;  Laterality: Left;    TRANSRECTAL ULTRASOUND EXAMINATION N/A 8/12/2019    Procedure: TRANSRECTAL ULTRASOUND;  Surgeon: Latia Yoon MD;  Location: [facility] OR;  Service: Urology;  Laterality: N/A;    VASECTOMY      WISDOM TOOTH EXTRACTION         Current Outpatient Medications   Medication Sig    aspirin (ECOTRIN) 81 MG EC tablet Take 81 mg by mouth once daily.    azelastine (ASTELIN) 137 mcg nasal spray 2 sprays (274 mcg total) by Nasal route 2 (two) times daily.    diclofenac sodium (VOLTAREN) 1 % Gel     EPIPEN 2-KIRIT 0.3 mg/0.3 mL AtIn     ergocalciferol (ERGOCALCIFEROL) 50,000 unit Cap     fluocinolone (DERMA-SMOOTHE) 0.01 % external oil Apply topically 3 (three) times daily. Apply to damp scalp at bedtime, wash off qam, avoid chronic use.    fluticasone propionate (FLONASE) 50 mcg/actuation nasal spray     folic acid (FOLVITE) 1 MG tablet     hydrOXYzine HCL (ATARAX) 25 MG tablet TK 1 T PO Q 8 H PRN    itraconazole (SPORANOX) 100 mg Cap Finish the prescription.Take with food/milk.    losartan (COZAAR) 100 MG tablet     montelukast (SINGULAIR) 10 mg tablet TAKE 1 TABLET DAILY    omeprazole (PRILOSEC) 10 MG capsule Take 10 mg by mouth once daily.    phenazopyridine (PYRIDIUM) 200 MG tablet TAKE 1 TABLET BY MOUTH THREE TIMES DAILY AS NEEDED FOR FOR PAIN     pravastatin (PRAVACHOL) 20 MG tablet Take with food/milk.Take or use exactly as directed.Obtain advice for OTCs.Do not take if pregnant.    predniSONE (DELTASONE) 10 MG tablet Take 10 mg by mouth every 12 (twelve) hours.    tadalafiL (CIALIS) 5 MG tablet Obtain advice for OTCs.Avoid nitrates.    tamsulosin (FLOMAX) 0.4 mg Cap May cause drowsiness.Be careful if taking OTCs.Take or use exactly as directed.Swallow whole.    terbinafine HCL (LAMISIL) 1 % cream Apply topically 2 (two) times daily.    triamcinolone acetonide 0.1% (KENALOG) 0.1 % cream aaa bid x 2 weeks then prn, avoid chronic use    urea 20 % Crea      No current facility-administered medications for this visit.       Review of patient's allergies indicates:   Allergen Reactions    Adhesive     Amlodipine benzoate     Codeine Hives    Morphine Hives    Sulfa (sulfonamide antibiotics) Hives and Itching       Family History   Problem Relation Name Age of Onset    Dementia Mother      Cancer Father          skin    Heart disease Father      Allergic rhinitis Neg Hx      Allergies Neg Hx      Angioedema Neg Hx      Asthma Neg Hx      Atopy Neg Hx      Eczema Neg Hx      Immunodeficiency Neg Hx      Rhinitis Neg Hx      Urticaria Neg Hx         Social History     Socioeconomic History    Marital status:    Tobacco Use    Smoking status: Former     Current packs/day: 0.00     Average packs/day: 1 pack/day for 10.0 years (10.0 ttl pk-yrs)     Types: Cigarettes     Start date: 4/3/1964     Quit date: 4/3/1974     Years since quittin.3    Smokeless tobacco: Never   Substance and Sexual Activity    Alcohol use: No    Drug use: No     Social Determinants of Health     Financial Resource Strain: Low Risk  (2024)    Overall Financial Resource Strain (CARDIA)     Difficulty of Paying Living Expenses: Not hard at all   Food Insecurity: No Food Insecurity (2024)    Hunger Vital Sign     Worried About Running Out of Food in the Last Year: Never true      Ran Out of Food in the Last Year: Never true   Transportation Needs: No Transportation Needs (4/8/2024)    PRAPARE - Transportation     Lack of Transportation (Medical): No     Lack of Transportation (Non-Medical): No   Physical Activity: Insufficiently Active (4/8/2024)    Exercise Vital Sign     Days of Exercise per Week: 4 days     Minutes of Exercise per Session: 20 min   Stress: No Stress Concern Present (4/8/2024)    [employer] [place] [employer] - Occupational Stress Questionnaire     Feeling of Stress : Not at all   Housing Stability: Low Risk  (4/8/2024)    Housing Stability Vital Sign     Unable to Pay for Housing in the Last Year: No     Number of Places Lived in the Last Year: 1     Unstable Housing in the Last Year: No       Chief Complaint:   Chief Complaint   Patient presents with    Right Knee - Pain         Date of surgery:  August 25, 2020 left total knee    History of present illness: 78-year-old male underwent left total knee arthroplasty.  Right knee is been bothering him for the last few weeks. Been hurting for about a month or so now.  He has been getting periodic viscosupplementation injections.  Pain is a 1/10 today.    Review of Systems:    Musculoskeletal:  See HPI        Physical Examination:    Vital Signs:    Vitals:    07/29/24 0949   Resp: 18           Body mass index is 35.53 kg/m².    This a well-developed, well nourished patient in no acute distress.  They are alert and oriented and cooperative to examination.  Pt. walks without an antalgic gait.      Examination of the right knee shows no rashes or erythema. There are no masses ecchymosis or effusion. Patient has full range of motion from 0-130°. Patient is nontender to palpation over lateral joint line and moderately tender to palpation over the medial joint line. Patient has a - Lachman exam, - anterior drawer exam, and - posterior drawer exam. - Juliana's exam. Knee is stable to varus and valgus stress. 5 out of 5  motor strength. Palpable distal pulses. Intact light touch sensation. Negative Patellofemoral crepitus          X-rays:  Four views of the left knee are reviewed which show well-aligned left total knee arthroplasty components without complication.  Severe medial compartment right knee arthritis     Assessment::  Status post left [place] CR total knee arthroplasty  Severe right varus knee arthritis      Plan:   I injected his right knee with Monovisc today. Follow up as needed.      This note was created using M Mod[employer] voice recognition software that occasionally misinterpreted phrases or words.

## 2025-02-10 ENCOUNTER — OFFICE VISIT (OUTPATIENT)
Dept: UROLOGY | Facility: CLINIC | Age: 80
End: 2025-02-10
Payer: MEDICARE

## 2025-02-10 DIAGNOSIS — N30.10 INTERSTITIAL CYSTITIS: ICD-10-CM

## 2025-02-10 DIAGNOSIS — N52.9 ERECTILE DYSFUNCTION, UNSPECIFIED ERECTILE DYSFUNCTION TYPE: ICD-10-CM

## 2025-02-10 DIAGNOSIS — N40.0 BENIGN PROSTATIC HYPERPLASIA, UNSPECIFIED WHETHER LOWER URINARY TRACT SYMPTOMS PRESENT: Primary | ICD-10-CM

## 2025-02-10 LAB
BILIRUBIN, UA POC OHS: NEGATIVE
BLOOD, UA POC OHS: NEGATIVE
CLARITY, UA POC OHS: CLEAR
COLOR, UA POC OHS: YELLOW
GLUCOSE, UA POC OHS: NEGATIVE
KETONES, UA POC OHS: NEGATIVE
LEUKOCYTES, UA POC OHS: NEGATIVE
NITRITE, UA POC OHS: NEGATIVE
PH, UA POC OHS: 5.5
POC RESIDUAL URINE VOLUME: 0 ML (ref 0–100)
PROTEIN, UA POC OHS: ABNORMAL
SPECIFIC GRAVITY, UA POC OHS: 1.02
UROBILINOGEN, UA POC OHS: 0.2

## 2025-02-10 PROCEDURE — 99214 OFFICE O/P EST MOD 30 MIN: CPT | Mod: S$PBB,,, | Performed by: NURSE PRACTITIONER

## 2025-02-10 PROCEDURE — 51798 US URINE CAPACITY MEASURE: CPT | Mod: PBBFAC,PO | Performed by: NURSE PRACTITIONER

## 2025-02-10 PROCEDURE — G2211 COMPLEX E/M VISIT ADD ON: HCPCS | Mod: S$PBB,,, | Performed by: NURSE PRACTITIONER

## 2025-02-10 PROCEDURE — 99999 PR PBB SHADOW E&M-EST. PATIENT-LVL IV: CPT | Mod: PBBFAC,,, | Performed by: NURSE PRACTITIONER

## 2025-02-10 PROCEDURE — 99999PBSHW POCT URINALYSIS(INSTRUMENT): Mod: PBBFAC,,,

## 2025-02-10 PROCEDURE — 99214 OFFICE O/P EST MOD 30 MIN: CPT | Mod: PBBFAC,PO | Performed by: NURSE PRACTITIONER

## 2025-02-10 PROCEDURE — 81003 URINALYSIS AUTO W/O SCOPE: CPT | Mod: PBBFAC,PO | Performed by: NURSE PRACTITIONER

## 2025-02-10 PROCEDURE — 99999PBSHW POCT BLADDER SCAN: Mod: PBBFAC,,,

## 2025-02-10 RX ORDER — PHENAZOPYRIDINE HYDROCHLORIDE 200 MG/1
200 TABLET, FILM COATED ORAL
Qty: 90 TABLET | Refills: 2 | Status: SHIPPED | OUTPATIENT
Start: 2025-02-10 | End: 2025-05-11

## 2025-02-10 RX ORDER — TADALAFIL 5 MG/1
TABLET ORAL
Qty: 90 TABLET | Refills: 3 | Status: SHIPPED | OUTPATIENT
Start: 2025-02-10

## 2025-02-10 NOTE — PATIENT INSTRUCTIONS
Discussed conservative measures to control urgency and frequency including   1. Avoiding/minimizing bladder irritants (see below), especially in afternoon and evening hours     Discussed bladder irritants include coffee (even decaf), tea, alcohol, soda, spicy foods, acidic juices (orange, tomato), vinegar, and artificial sweeteners/sugary beverages.     2. Do Timed Voiding - empty on a schedule (approx ~2-3 hours) in spite of need to urinate, to get ahead of urge     3. Do not postponing voiding - dont hold it on purpose      4. Bowel regimen as distended bowel has extrinsic compressive effect on bladder.   - any or all of the following in any combination, titrate to soft daily bowel movement without pushing or straining  - colace/stool softener capsule - once to twice daily  - miralax - 1 capful daily to start, can increase to 2x daily (or decrease to 1/2 cap daily)  - increase dietary fibery  - fiber supplements, such as metamucil  - prunes, prune juice     5. INCREASE water intake     6. Stop fluids 2 hours before bed, and urinate just before bed

## 2025-02-10 NOTE — PROGRESS NOTES
[facility] Note  Staff: FUNMI Avila    PCP: None  :  MD Karrie    Chief Complaint: Annual f/up-BPH, Chronic IC, ED.    Subjective:        HPI: Manpreet Kerr is a 79 y.o. male presents today for annual f/up and medication refill.  He is an Established pt of Dr. Latia Yoon.      HX:  Interval history 3/23/21:  He has a h/o elevated PSA, prostate biopsy and US in in 2008, urinary retention x 2  In 2016, hypogonadism, requesting vas reversal, recurrent uti's.  Cysto trus on 8/12/19 showed b lobe hypertrophy, no stricture. 22g prostate. No IV median lobe. Ctu showed no obvious cause for uti's. Put him on cialis 5mg daily. Residuals improved to 30-50cc with better stream. 10/15/19 UF on tadalafil 5mg daily : avg flow 11, voided vol: 320, pvr by scan: 56. Repeat UF 2/20/20 peak flow 14.3, avg flow 7.9, voided vol: 257, pvr by scan: 25  At last visit 9/2020 he was found to have retention post knee surgery. Asked him to start flomax 0.4mg nightly again and then return for fill and pull. pvr was 25cc  Returns today and states urinating with issues. Took flomax for a few days to urinate last time and it worked but he feels bad when he takes it. Taking tadalafil 5mg once daily. Still on testosterone. No vasovasostomy. He's been using 20mg as needed for ED in addition to the 5mg daily (15mg additional).   AUA ssx:(0 incomplete emptying, 0 frequency, 0 intermittency, 0 urgency, 1 weak stream, 0 straining, 1 sleeping). 2. QOL: delighted  No psa since 11/2019, 0.48 then and he was 74 yo. ua with no blood or eluk No uti's since I last saw him     Interval history 3/28/22:  In 6/2021 and 12/2021 he had some itching, frequency and dysuria. He went to urgent care both times. He was told he had uti each time and he was given antibiotics. He thinks it's due to not drinking a enough. ua today: neg  He says his main issue is his oversensitivity to paprika and bladder which causes  frequency, burning and incontinence.  He feels like he has a good urine flow. He is on low dose cialis 5mg for bph. pvr by scan:42. He uses additional cialis for Ed (will take 20 as needed)  On androgel 3 pumps a day/his pcp from [facility] writes for this.      Interval history 4/24/23:  He returns to see me for BPH and ED which is managed with Cialis 5 mg daily with additional bumps up to 15 or 20 mg.  [facility]. he is happy on this dose.  His AUA symptom score is 5.  AUA ssx:(1 incomplete emptying, 2 frequency, 0 intermittency, 1 urgency, 0 weak stream, 0 straining, 0 sleeping). 5. QOL: pleased  He is also still on AndroGel 3 pumps a day from primary care at [facility].  And he says his IC is well-controlled with Prelief which he takes every day, 2 a day.  Has not tried any other supplements but did try elimination diet and it showed that paprika, dark sodas and honey all irritate his bladder.  He also had 2 cystoscopies 1 of which showed erythematous bladder and at 1st was going to get a biopsy but a follow-up cystoscopy showed a normalized after using Prelief daily.  However voided urine today does show nitrites and leukocytes.  He denies any urinary frequency urgency cloudy urine or other UTI symptoms.  He has not had a UTI in a while.  I have not seen him in year and no symptoms any year.     Interval history 4/9/2024:  He returns today for annual f/up and medication refills.  Pt with hx of BPH and ED which is managed with Cialis 5 mg daily with additional bumps up to 15 or 20 mg.  [facility]. he is happy on this dose.  His AUA symptom score is 5.  AUA ssx:(1 incomplete emptying, 2 frequency, 0 intermittency, 1 urgency, 0 weak stream, 0 straining, 0 sleeping). 5. QOL: pleased  He is also still on AndroGel 3 pumps a day from primary care at [facility].  And he says his IC is well-controlled with Prelief and Aloe-Vera at this time which  "he takes every day, 2 a day.  Has not tried any other supplements but did try elimination diet and it showed that paprika, dark sodas and honey all irritate his bladder.  He also had 2 cystoscopies 1 of which showed erythematous bladder and at 1st was going to get a biopsy but a follow-up cystoscopy showed a normalized after using Prelief daily.    Interval hx NP-O'Winnie 2/10/25:  Pt returns today for annual f/up and medication refills.  Pt with hx of BPH and ED which is managed with Cialis 5 mg PO Daily with additional "bumps" up to 15 or 20 mg prn per MD's original orders.  He is also still on AndroGel 3 pumps a day from primary care at [facility].  Hx IC:  Pt states his IC is well-controlled with Prelief and Aloe-Vera at this time which he takes every day, 2 a day.  But does still remain with irritative symptoms more often now if he eats anything spicy.   He also had 2 cystoscopies 1 of which showed erythematous bladder and at 1st was going to get a biopsy but a follow-up cystoscopy showed a normalized after using Prelief daily.  PT denies dysuria and gross hematuria symptoms today.  AUA SS: 3/1 Pleased (Urinary Frequency, Weak Stream, Nocturia-1)     Urine history:   2/10/25 Trace protein; PVR-0 mL  4/24/23            Nit+/tr leuk, pvr: 10  3/29/22            Neg, pvr by scan: 42  2/6/20              Tr leuk  10/15/19          neg/100 glucose  8/12/19            Ng, void: n/a  7/30/19            No cx, void:neg,   7/18/19            No cx, void:  tr leuk - on cipro 3rd day  10/16/18          No cx, void: neg, , mycoplasma and urea neg   9/1/18              Multiple org, void: sml leuk, 30-50 wbc  3/8/18              Pvr: 21 (fill a nd pull)   3/2018             Pvr by I&O: 700cc   10/29/17          No cx, void:  Neg  6/2/16              E.coli  6/7/16              Pvr: 11cc  5/31/16            pvr by I&O:  460cc   2/24/15            No cx, void:neg  1/7/15              E.coli res to amp, cipro, lev, tetra, void: " neg  10/9/14            No cx, void: neg  4/9/14              No c,x void: neg  3/15/10            Ng, void:  neg  11/6/08            Multiple org     psa history: MGF - had prostate cancer at a.84  3/23/21            DARRYL: 30g no nodules  8/12/19            Cysto trus 22.2g prostate with mod b lobe hypertrophy and no IV median lobe. pvr by aspiration: 60cc. Recommended urolift.   7/31/19            0.48  7/30/19            DARRYL: 30g, firm on right, pvr by scan: 30cc  7/18/19            pvr: 55cc. UF avg flow 2.9mL/s, voided volume: 37. Pvr: 63cc   10/16/18           pvr: 0  12/15/17          0.458  2016                0.31  2015                0.38  2014                0.5  1/16/12            0.56       1/5/11              0.36  1/6/10              0.30  2008                Negative prostate biopsy      REVIEW OF SYSTEMS:  As above     Allergies:  Adhesive, Codeine, Morphine, and Sulfa (sulfonamide antibiotics)   Sulfa      Anticoagulation: Yes - asa 81mg daily    PMHx:  Past Medical History:   Diagnosis Date    Allergy     Arthritis     BPH with obstruction/lower urinary tract symptoms     Environmental allergies     H/O prostatitis     Hepatic cyst 03/31/2024    History of urinary retention     [place] (hard of hearing)     WEARS BILATERAL HEARING AIDS    [place] (hard of hearing)     wears hearing aids    Hypertension     RUQ pain 03/31/2024    Sleep apnea     Urinary tract infection      PSHx:  Past Surgical History:   Procedure Laterality Date    HERNIA REPAIR      INGUNAL     KNEE ARTHROPLASTY Left 8/25/2020    Procedure: ARTHROPLASTY, KNEE;  Surgeon: Max Garcia MD;  Location: [facility] OR;  Service: Orthopedics;  Laterality: Left;    TRANSRECTAL ULTRASOUND EXAMINATION N/A 8/12/2019    Procedure: TRANSRECTAL ULTRASOUND;  Surgeon: Latia Yoon MD;  Location: [facility] OR;  Service: Urology;  Laterality: N/A;    VASECTOMY      WISDOM TOOTH EXTRACTION       Allergies:  Adhesive, Amlodipine benzoate, Codeine,  Morphine, and Sulfa (sulfonamide antibiotics)    Medications: reviewed     Objective:   There were no vitals filed for this visit.    General:WDWN in NAD  Eyes: PERRLA, normal conjunctiva  Respiratory: no increased work on breathing, clear to auscultation  Cardiovascular: regular rate and rhythm. No obvious extremity edema.  GI: palpation of masses. No tenderness. No hepatosplenomegaly to palpation.  Musculoskeletal: normal range of motion of bilateral upper extremities. Normal muscle strength and tone.  Skin: no obvious rashes or lesions. No tightening of skin noted.  Neurologic: CN grossly normal. Normal sensation.   Psychiatric: awake, alert and oriented x 3. Mood and affect normal. Cooperative.    Assessment:       1. Benign prostatic hyperplasia, unspecified whether lower urinary tract symptoms present    2. Interstitial cystitis    3. Erectile dysfunction, unspecified erectile dysfunction type          Plan:     BPH with LUTS:  Cialis 5 mg PO Daily refilled for this pt on conclusion of ov today.  Pyridium 200 mg PO PRN bladder pain refilled today.  Both meds were sent to [facility] order pharmacy per pt request.    Chronic IC symptoms:  Ambulatory referral to Pelvic Floor PT was entered upon conclusion into ov today.  Pt requesting information to see if PTNS would help with his chronic IC/irritative symptoms.    F/u with Dr. Latia Yoon to discuss further treatment options for his chronic IC symptoms including PTNS therapy that he has researched in the past.      Pepper Hensley, EMMETT-BUDDY  Visit today is associated with current or anticipated ongoing medical care related to this patient's single serious condition/complex condition.

## 2025-02-17 ENCOUNTER — CLINICAL SUPPORT (OUTPATIENT)
Dept: REHABILITATION | Facility: HOSPITAL | Age: 80
End: 2025-02-17
Payer: MEDICARE

## 2025-02-17 DIAGNOSIS — N30.10 INTERSTITIAL CYSTITIS: ICD-10-CM

## 2025-02-17 DIAGNOSIS — M62.89 PELVIC FLOOR DYSFUNCTION: Primary | ICD-10-CM

## 2025-02-17 PROCEDURE — 97530 THERAPEUTIC ACTIVITIES: CPT | Mod: PO

## 2025-02-17 PROCEDURE — 97161 PT EVAL LOW COMPLEX 20 MIN: CPT | Mod: PO

## 2025-02-17 NOTE — PROGRESS NOTES
Outpatient Rehab    Physical Therapy Evaluation    Patient Name: Manpreet Kerr  MRN: [id number]  YOB: 1945  Today's Date: 2/17/2025    Therapy Diagnosis:   Encounter Diagnoses   Name Primary?    Interstitial cystitis     Pelvic floor dysfunction Yes     Physician: Pepper Hensley FNP    Physician Orders: Eval and Treat  Medical Diagnosis: Interstitial cystitis    Visit # / Visits Authorized:  1 / 1   Date of Evaluation:  2/17/2025   Insurance Authorization Period: 2/10/2025 to 2/10/2026  Plan of Care Certification:  2/17/2025 to 5/12/2025      Time In: 1000   Time Out: 1105  Total Time: 65   Total Billable Time: 65    Intake Outcome Measure for FOTO Survey    Therapist reviewed FOTO scores for Manpreet Kerr on 2/17/2025.   FOTO report - see Media section or FOTO account episode details.     Intake Score:  %         Subjective   History of Present Illness  Manpreet is a 79 y.o. male who reports to physical therapy with a chief concern of his symptoms have been going on for about 10 years now. Has a history of UTIs. States that he gets dehydrated and does not urinate enough. This bladder acidity causes increased irritation and bacteria. Started preleaf that helps with the acidity. This seems to have helped a lot. Ordered aloevera to manage his symptoms. Takes four preleaf tablets per morning and 3 aloe vera. This seems to help a lot- has been taking for 3 or 4 years. Now, spicy foods do bother him (so does cheetos, Paprika and chili) with a more intense reaction. Does drink half of his jug of water before bed, uses a CPAP each night. Flomax makes him feel sick and was prescribed medication for ED. Taking Cialis (5mg daily). Had a right knee replacment in December.. According to the patient's chart, Manpreet has a past medical history of Allergy, Arthritis, BPH with obstruction/lower urinary tract symptoms, Environmental allergies, H/O prostatitis, Hepatic cyst, History of urinary retention, [place] (hard of  hearing), [place] (hard of hearing), Hypertension, RUQ pain, Sleep apnea, and Urinary tract infection. Manpreet has a past surgical history that includes Hernia repair; Vasectomy; [place] tooth extraction; Transrectal ultrasound examination (N/A, 8/12/2019); and Knee Arthroplasty (Left, 8/25/2020).                     Bladder/Bowel Habits and Symptoms  Bladder Habits  Urine Stream: Other (Comment)  Other Urine Stream Comment: normal to start; dribbly towards the end  Bladder Emptying: Complete  Frequency of Bladder Urgency: Occasionally  Bladder Urge Triggers: Walking to bathroom  Urinary Urge/Sensation: Normal  Ability to Delay Urination: 30 minutes  Daytime Frequency of Urination: going maybe 5-6x/day- going for every 2 hours  Nighttime Frequency of Urination: 1-2x/night (drinks right before bed)  Just in Case Voiding: Yes  Subjective Urine Volume: Medium  Estimated Fluid Intake: chugs a few ounces before bed. In the morning, drinks 16 ounces of coffee in the morning- then fills mug up with milk or a juice. Small amount with medications. Juice again with dinner, sometimes this is water. Not enough pure water.    Urinary Symptoms  Urine Leakage Amount: Few drops  Frequency of Urinary Incontinence: Other (Comment)  Other Frequency of Urinary Incontinence: only seems to happen when he eats Paprika    Bowel Habits  [place] Stool Form Scale: type 1  Frequency of Bowel Movements: 1 time per day  Frequency of Bowel Urgency: Frequently  Bowel Straining/Pushing: Occasionally  Bowel Incomplete Emptying: Never  Pain with Bowel Movements: Never  Constipation: Frequently  Attempts to Manage Constipation: Fiber  Hemorrhoids: Internal    Bowel Symptoms  Bowel Incontinence Issues: Fecal - solid  Frequency of Fecal Leakage: Multiple times per week  Amount of Fecal Leakage: Small    Bladder or Bowel Leakage Protection  Protection for Leakage: Other (Comment)  Other Leakage Protection Comments: if he has eaten something that makes his lips  or urine burn, he will wear a depends as a safety net.        Sexual Function  Pain with Sexual Function: None  Able to Achieve Orgasm: Yes    Review of Systems  Patient reports: Bladder Incontinence and Bowel Incontinence        Personal Factors  Details on the patient's current diet include: tries to intake a decent amount of fiber and protein. Does enjoy cooking The patient's physical activity or sport participation includes: is in PT for his right knee. Set up his exercycle with a computer so that he can cycle more often. Takes his dogs out. On average, the number of days per week the patient engages in moderate to strenuous exercise, like a brisk walk, is 7 days. On average, the length of time per day that the patient engages in moderate to strenuous exercise is 60 min.             Living Arrangements  Living Situation  Living Arrangements: Alone        Employment  Patient does not report that: Does the patient's condition impact their ability to work?  Employment Status: Retired          Past Medical History/Physical Systems Review:   Manpreet Kerr  has a past medical history of Allergy, Arthritis, BPH with obstruction/lower urinary tract symptoms, Environmental allergies, H/O prostatitis, Hepatic cyst, History of urinary retention, [place] of hearing[place] (hard of hearing), Hypertension, RUQ pain, Sleep apnea, and Urinary tract infection.    Manpreet Kerr  has a past surgical history that includes Hernia repair; Vasectomy; [place] tooth extraction; Transrectal ultrasound examination (N/A, 8/12/2019); and Knee Arthroplasty (Left, 8/25/2020).    Manpreet Christianson has a current medication list which includes the following prescription(s): aspirin, azelastine, diclofenac sodium, epipen 2-sunshine, fluocinolone, fluticasone propionate, hydroxyzine hcl, losartan, montelukast, omeprazole, phenazopyridine, prednisone, tadalafil, terbinafine hcl, and triamcinolone acetonide 0.1%.    Review of patient's  allergies indicates:   Allergen Reactions    Adhesive     Amlodipine benzoate     Codeine Hives    Morphine Hives    Sulfa (sulfonamide antibiotics) Hives and Itching        Objective   Pelvic External Observations       Abdominal Assessment  Abdominal Scarring: Yes  Transversus Abdominis Contraction: Weak  Additional Abdominal Assessment Details: RUSI assessment performed with great filling of bladder observed. Great lift of bladder base with cues to perform Kegel like he is stopping urine/gas. Poor endurance hold with notable compensation with repetitions. Delayed relaxation present with poor pelvic drop upon inspiration. Patient tends to contract pelvic floor with attempted inhale.                 Treatment:     Therapeutic Activity Patient participated in dynamic functional therapeutic activities to improve functional performance for 30 minutes. Including: Education as described below.     [x] bladder irritants, anatomy/physiology of pelvic floor, posture/body mechanices, bladder retraining, diaphragmatic breathing, double voiding techniques, kegels, proper bearing down techniques, fluid intake/dietary modifications, and behavior modifications   [x] Instruction on diaphragmatic breathing   [x] Education on visual cues/mental imagery for pelvic floor relaxation  [x] Education on visual cues/mental imagery for pelvic floor activation   [x] Pelvic anatomy edu and impact on current level of function   [x] HEP building      Assessment & Plan   Assessment  Manpreet presents with a condition of Low complexity.   Presentation of Symptoms: Stable       Functional Limitations: Activity tolerance, Ambulating on uneven surfaces, Decreased ambulation distance/endurance, Functional mobility, Gait limitations, Getting off the floor, Gross motor coordination, Increased risk of fall  Impairments: Abnormal gait, Abnormal coordination, Abnormal muscle tone, Abnormal or restricted range of motion, Activity intolerance, Impaired  balance, Impaired physical strength  Personal Factors Affecting Prognosis: Physical limitations    Patient Goal for Therapy (PT): Improve bladder symptoms (IC diagnosis) with transcutaneous tibial nerve stimulation  Prognosis: Good  Assessment Details:  Manpreet Christianson is a 79 y.o. male referred to outpatient Physical Therapy with a medical diagnosis of  Interstitial cystitis. Pt presents with altered posture, pelvic asymmetry, poor knowledge of body mechanics and posture, poor trunk stability, decreased pelvic muscle strength, decreased endurance of the pelvic muscles, decreased phasic ability of the pelvic muscles, increased tension of the pelvic muscles, poor coordination of pelvic floor muscles during ADL's leading to urinary or fecal leakage, poor fluid intake, poor diet, dysfunctional voiding, unable to co-contract or co-relax abdominal wall and pelvic floor muscles, and chronic UTI due to dysfunctional voiding. Upon rehabilitative ultrasound assessment, great filling of bladder observed. Great lift of bladder base with cues to perform Kegel like he is stopping urine/gas. Poor endurance hold with notable compensation with repetitions. Delayed relaxation present with poor pelvic drop upon inspiration. Patient tends to contract pelvic floor with attempted inhale. Overall, patient will benefit from transcutaneous tibial nerve stimulation to address urinary urgency, urge incontinence, and bowel incontinence, utilizing mostly urgency parameters. Patient will also require modifications secondary to right knee replacement in December that limits mobility partially.     Plan  From a physical therapy perspective, the patient would benefit from: Skilled Rehab Services    Planned therapy interventions include: Therapeutic activities, Therapeutic exercise, Neuromuscular re-education, Manual therapy, and ADLs/IADLs.    Planned modalities to include: Biofeedback, Electrical stimulation - attended, Electrical stimulation -  passive/unattended, Ama/anal/urethral biofeedback, Ultrasound, and Other (Comment). Functional dry needling      Visit Frequency: 1 times Per Week for 6 Weeks.  Other/tapered frequency details: Followed by every other week x 3 visits    This plan was discussed with Patient.   Discussion participants: Agreed Upon Plan of Care             Patient's spiritual, cultural, and educational needs considered and patient agreeable to plan of care and goals.     Education  Education was done with Patient. The patient's learning style includes Demonstration, Listening, and Pictures/video. The patient Verbalizes understanding.                 Goals:   Active       Physical Therapy       Physical Therapy Goal       Start:  02/17/25    Expected End:  05/12/25       Goals:  Short Term Goals: 6 weeks   - Pt will demonstrate excellent knowledge and adherence to HEP to facilitate optimal recovery.  - Pt will demonstrate proper PFM contraction, relaxation, and lengthening coordinated with TA and breath for improved muscle coordination needed for functional activity.    Long Term Goals: 12 weeks   - Pt will demonstrate excellent knowledge and adherence to HEP for continued self-maintenance of symptoms.  - Pt will report voiding interval of 2-3 hours for improved ADL tolerance.  - Pt will report no incidence of urinary or fecal  incontinence 7/7 days for improved hygiene and ADL/IADL tolerance.   - Pt will report needing </= 0-1 pad/day indicating improved PFM function needed to maintain continence  - Pt will demonstrate PFM strength of at least 3/5 MMT for improved strength needed to maintain continence.   - Pt will report bearing down appropriately 100% of the time for improved bowel function and decreased stress on adjacent pelvic structures.   - Pt will demonstrate independence with pressure-management strategies to decreased stress on adjacent pelvic structures.                Tonia Mcnair, PT

## 2025-02-17 NOTE — PATIENT INSTRUCTIONS
1) Increase your pure water intake. Consider small sips throughout the day- goal is to get to half your body weight in ounces     Remember that 50/50 method that allows for decreased bladder irritation and increase pure water intake    Home Exercise Program: 02/17/2025    DOUBLE VOIDING - Double voiding is a technique that may assist the bladder to empty more effectively when  urine is left in the bladder. It involves passing  urine more than once each time that you go to  the toilet. This makes sure that the bladder is  completely empty.    Here are 3 strategies you can try to fully empty your bladder.  You do not have to do all of these things every single time. Find which ones work best for you.     Check to make sure your pelvic floor is relaxed   Do a body scan - make sure your legs, buttocks, and abdominals are relaxed.  Take a couple deep, slow breaths to encourage your pelvic floor muscles to DROP (ie. Try Diaphragmatic Breathing).  Gently apply pressure over your bladder.  Change your pelvic position: lean forward, rock your pelvis forward and backward, stand up then sit back down.     Wait at least 30 seconds to 1 minute to see if a second urine stream begins.     Do not stop your urine stream or push/strain!      Home Exercise Program: 02/17/2025    DIAPHRAGMATIC BREATHING     The diaphragm is a dome shaped muscle that forms the floor of the rib cage. It is the most efficient muscle for breathing and relaxation, although most people are not used to using the diaphragm. Diaphragmatic or belly breathing is an important technique to learn because it helps settle down or relax the autonomic nervous system. The correct use of diaphragmatic breathing can help to quiet brain activity resulting in the relaxation of all the muscles and organs of the body. This is accomplished by slow rhythmic breathing concentrated in the diaphragm muscle rather than the chest.    How to do proper relaxation breathing:    Start by  lying on your back or reclining in a chair in a relaxed position. Place one hand on your chest and the other on your abdomen.  Relax your jaw by placing your tongue on the bottom of your mouth and keeping your teeth slightly apart.   Take a deep breath in, letting the abdomen expand and rise while you keep your upper chest, neck and shoulders relaxed.   As you breathe out, allow your abdomen and chest to fall. Exhale completely.  It doesn't matter if you breathe in/out through your nose and/or mouth. Do whichever feels comfortable.  Remember to breathe slowly.  Do not force your breathing. Do not hold your breath.  Repeat for 5-10 minutes every day.        Souble Fiber Supplement (ex: Metamucil, Benefiber):     Used to bulk stool so it is solid but easy to pass  Start with 1 tsp per day  Every 3-5 days, add an additional tsp   Stop when you reach a dose that gives stools that are soft but formed  Do not exceed 6 tsp/day   Make sure to keep up with your water intake to avoid constipation          Long Holds  Hold each squeeze for 5 seconds. Rest 5 seconds.   Repeat 10 times. Perform 2 sets/day.     The goal is for COMPLETE muscle relaxation between squeezes. Utilize that breathing technique above to fully relax.     Quick Flicks  Hold 1 second. Rest 1 second (or to complete relaxation)  Repeat 10 times. Perform 2 sets/day.

## 2025-02-25 ENCOUNTER — CLINICAL SUPPORT (OUTPATIENT)
Dept: REHABILITATION | Facility: HOSPITAL | Age: 80
End: 2025-02-25
Payer: MEDICARE

## 2025-02-25 DIAGNOSIS — M62.89 PELVIC FLOOR DYSFUNCTION: Primary | ICD-10-CM

## 2025-02-25 PROCEDURE — 97530 THERAPEUTIC ACTIVITIES: CPT | Mod: PO,CQ

## 2025-02-25 NOTE — PROGRESS NOTES
Outpatient Rehab    Physical Therapy Visit    Patient Name: Manpreet Kerr  MRN: [id number]  YOB: 1945  Encounter Date: 2/25/2025    Therapy Diagnosis:   Encounter Diagnosis   Name Primary?    Pelvic floor dysfunction Yes     Physician: Pepper Hensley FNP    Physician Orders: Eval and Treat    Medical Diagnosis: Interstitial cystitis     Visit # / Visits Authorized:  1 / 10   Date of Evaluation:  2/17/2025   Insurance Authorization Period: 2/10/2025 to 2/10/2026  Plan of Care Certification:  2/17/2025 to 5/12/2025                 Time In: 1300   Time Out: 1355  Total Time: 55   Total Billable Time: 55     Intake Outcome Measure for FOTO Survey     Therapist reviewed FOTO scores for Manpreet Kerr on 2/17/2025.   FOTO report - see Media section or FOTO account episode details.      Intake Score:  %    FOTO:  Intake Score:  %  Survey Score 1:  %  Survey Score 2:  %         Subjective   He manages his symptoms well but is interested if subcutaneous tibal nerve stimulation will help him..  Pain reported as 0/10.      Objective            Treatment:  Therapeutic Activity  Therapeutic Activity 1: Anatomy and physiology of PF.  Therapeutic Activity 2: urge suppression including roll for control  Therapeutic Activity 3: bladder irritants  Therapeutic Activity 4: toileting mechancis  Therapeutic Activity 5: relaxation techniques  Therapeutic Activity 6: pressure management  Therapeutic Activity 7: sit to stand with kegel and exhale  Therapeutic Activity 8: Indepth discussion on subcutaneous tibial nerve stimulation for independent use.    Assessment & Plan   Assessment: Today's treatment focused on education. Although pt has been very proactive managing his symptoms he will benefit from additiona education provided today. Indepth discussion on subcutaneous tibial nerve stimulation for urge suppression and bladder pain. Pt was encouraged to purchase a TENS unit that he can use indepentently       Patient will  continue to benefit from skilled outpatient physical therapy to address the deficits listed in the problem list box on initial evaluation, provide pt/family education and to maximize pt's level of independence in the home and community environment.     Patient's spiritual, cultural, and educational needs considered and patient agreeable to plan of care and goals.           Plan: Continue established POC     Goals:   Active       Physical Therapy       Physical Therapy Goal       Start:  02/17/25    Expected End:  05/12/25       Goals:  Short Term Goals: 6 weeks   - Pt will demonstrate excellent knowledge and adherence to HEP to facilitate optimal recovery.  - Pt will demonstrate proper PFM contraction, relaxation, and lengthening coordinated with TA and breath for improved muscle coordination needed for functional activity.    Long Term Goals: 12 weeks   - Pt will demonstrate excellent knowledge and adherence to HEP for continued self-maintenance of symptoms.  - Pt will report voiding interval of 2-3 hours for improved ADL tolerance.  - Pt will report no incidence of urinary or fecal  incontinence 7/7 days for improved hygiene and ADL/IADL tolerance.   - Pt will report needing </= 0-1 pad/day indicating improved PFM function needed to maintain continence  - Pt will demonstrate PFM strength of at least 3/5 MMT for improved strength needed to maintain continence.   - Pt will report bearing down appropriately 100% of the time for improved bowel function and decreased stress on adjacent pelvic structures.   - Pt will demonstrate independence with pressure-management strategies to decreased stress on adjacent pelvic structures.                Jessica Hoffmann, PTA

## 2025-02-26 ENCOUNTER — DOCUMENTATION ONLY (OUTPATIENT)
Dept: REHABILITATION | Facility: HOSPITAL | Age: 80
End: 2025-02-26
Payer: MEDICARE

## 2025-03-05 ENCOUNTER — CLINICAL SUPPORT (OUTPATIENT)
Dept: REHABILITATION | Facility: HOSPITAL | Age: 80
End: 2025-03-05
Payer: MEDICARE

## 2025-03-05 DIAGNOSIS — M62.89 PELVIC FLOOR DYSFUNCTION: Primary | ICD-10-CM

## 2025-03-05 PROCEDURE — 97112 NEUROMUSCULAR REEDUCATION: CPT | Mod: PO

## 2025-03-05 PROCEDURE — 97530 THERAPEUTIC ACTIVITIES: CPT | Mod: PO

## 2025-03-05 NOTE — PROGRESS NOTES
Outpatient Rehab    Physical Therapy Visit    Patient Name: Manpreet Kerr  MRN: [id number]  YOB: 1945  Encounter Date: 3/5/2025    Therapy Diagnosis:   Encounter Diagnosis   Name Primary?    Pelvic floor dysfunction Yes     Physician: Pepper Hensley FNP    Physician Orders: Eval and Treat    Medical Diagnosis: Interstitial cystitis     Visit # / Visits Authorized:  2 / 10   Date of Evaluation:  2/17/2025   Insurance Authorization Period: 2/10/2025 to 2/10/2026  Plan of Care Certification:  2/17/2025 to 5/12/2025                 Time In: 1050 am  Time Out: 1130  Total Time: 40  Total Billable Time: 40     Intake Outcome Measure for FOTO Survey     Therapist reviewed FOTO scores for Manpreet Kerr on 2/17/2025.   FOTO report - see Media section or FOTO account episode details.      Intake Score:  %         Subjective   doing good, he brought his TENS unit with him today..         Objective            Treatment:  Therapeutic Activity  Therapeutic Activity 1: Anatomy and physiology of PF.  Therapeutic Activity 2: urge suppression including roll for control  Therapeutic Activity 7: sit to stand with kegel and exhale 2 x 10  Therapeutic Activity 8: In depth discussion on subcutaneous tibial nerve stimulation for independent use- set up today and went through urgency parameters    Assessment & Plan   Assessment: Tibial nerve stimulation for urgency was set up and reviewed today. Utilized with pelvic floor activation tasks for further benefit in reduction of urgency symptoms. Pt able to set up and take off unit safely before end of today's visit.  Evaluation/Treatment Tolerance: Patient tolerated treatment well    Patient will continue to benefit from skilled outpatient physical therapy to address the deficits listed in the problem list box on initial evaluation, provide pt/family education and to maximize pt's level of independence in the home and community environment.     Patient's spiritual, cultural,  and educational needs considered and patient agreeable to plan of care and goals.           Plan: Continue established POC     Goals:   Active       Physical Therapy       Physical Therapy Goal (Progressing)       Start:  02/17/25    Expected End:  05/12/25       Goals:  Short Term Goals: 6 weeks   - Pt will demonstrate excellent knowledge and adherence to HEP to facilitate optimal recovery.  - Pt will demonstrate proper PFM contraction, relaxation, and lengthening coordinated with TA and breath for improved muscle coordination needed for functional activity.    Long Term Goals: 12 weeks   - Pt will demonstrate excellent knowledge and adherence to HEP for continued self-maintenance of symptoms.  - Pt will report voiding interval of 2-3 hours for improved ADL tolerance.  - Pt will report no incidence of urinary or fecal  incontinence 7/7 days for improved hygiene and ADL/IADL tolerance.   - Pt will report needing </= 0-1 pad/day indicating improved PFM function needed to maintain continence  - Pt will demonstrate PFM strength of at least 3/5 MMT for improved strength needed to maintain continence.   - Pt will report bearing down appropriately 100% of the time for improved bowel function and decreased stress on adjacent pelvic structures.   - Pt will demonstrate independence with pressure-management strategies to decreased stress on adjacent pelvic structures.                Tonia Mcnair, PT

## 2025-03-11 ENCOUNTER — CLINICAL SUPPORT (OUTPATIENT)
Dept: REHABILITATION | Facility: HOSPITAL | Age: 80
End: 2025-03-11
Payer: MEDICARE

## 2025-03-11 DIAGNOSIS — M62.89 PELVIC FLOOR DYSFUNCTION: Primary | ICD-10-CM

## 2025-03-11 PROCEDURE — 97112 NEUROMUSCULAR REEDUCATION: CPT | Mod: PO,CQ

## 2025-03-11 NOTE — PROGRESS NOTES
Outpatient Rehab    Physical Therapy Visit    Patient Name: Manpreet Kerr  MRN: [id number]  YOB: 1945  Encounter Date: 3/11/2025    Therapy Diagnosis:   Encounter Diagnosis   Name Primary?    Pelvic floor dysfunction Yes     Physician: Pepper Hensley FNP    Physician Orders: Eval and Treat    Medical Diagnosis: Interstitial cystitis     Visit # / Visits Authorized:  2 / 10   Date of Evaluation:  2/17/2025   Insurance Authorization Period: 2/10/2025 to 2/10/2026  Plan of Care Certification:  2/17/2025 to 5/12/2025                 Time In: 1045 am  Time Out: 1130  Total Time: 45  Total Billable Time: 45     Intake Outcome Measure for FOTO Survey     Therapist reviewed FOTO scores for Manpreet Kerr on 2/17/2025.   FOTO report - see Media section or FOTO account episode details.      Intake Score:  %         Subjective   Pt reports overall reduction of tension in pelvic region..  Pain reported as 0/10.      Objective            Treatment:  Balance/Neuromuscular Re-Education  Balance/Neuromuscular Re-Education Activity 1: modified child's pose performed seated with stool or ball in front for support 3 x 30 sec  Balance/Neuromuscular Re-Education Activity 2: Piriformis stretch performed seated for LLE and supine for RLE 3 x 30 each  Balance/Neuromuscular Re-Education Activity 3: Butterfly stretch 3 x 30  Balance/Neuromuscular Re-Education Activity 4: SKTC with towel 3 x 30    Assessment & Plan   Assessment: Today's treatment focused on establishing HEP that focused on stretching and lengthening PF musculature. HEP will likely be advanced at future visits to include strengthening exericses as well.  Evaluation/Treatment Tolerance: Patient tolerated treatment well    Patient will continue to benefit from skilled outpatient physical therapy to address the deficits listed in the problem list box on initial evaluation, provide pt/family education and to maximize pt's level of independence in the home and  community environment.     Patient's spiritual, cultural, and educational needs considered and patient agreeable to plan of care and goals.           Plan: Continue established POCContinue established POC.    Goals:   Active       Physical Therapy       Physical Therapy Goal (Progressing)       Start:  02/17/25    Expected End:  05/12/25       Goals:  Short Term Goals: 6 weeks   - Pt will demonstrate excellent knowledge and adherence to HEP to facilitate optimal recovery.  - Pt will demonstrate proper PFM contraction, relaxation, and lengthening coordinated with TA and breath for improved muscle coordination needed for functional activity.    Long Term Goals: 12 weeks   - Pt will demonstrate excellent knowledge and adherence to HEP for continued self-maintenance of symptoms.  - Pt will report voiding interval of 2-3 hours for improved ADL tolerance.  - Pt will report no incidence of urinary or fecal  incontinence 7/7 days for improved hygiene and ADL/IADL tolerance.   - Pt will report needing </= 0-1 pad/day indicating improved PFM function needed to maintain continence  - Pt will demonstrate PFM strength of at least 3/5 MMT for improved strength needed to maintain continence.   - Pt will report bearing down appropriately 100% of the time for improved bowel function and decreased stress on adjacent pelvic structures.   - Pt will demonstrate independence with pressure-management strategies to decreased stress on adjacent pelvic structures.                Jessica Hoffmann, PTA

## 2025-03-18 ENCOUNTER — CLINICAL SUPPORT (OUTPATIENT)
Dept: REHABILITATION | Facility: HOSPITAL | Age: 80
End: 2025-03-18
Payer: MEDICARE

## 2025-03-18 DIAGNOSIS — N30.10 INTERSTITIAL CYSTITIS: ICD-10-CM

## 2025-03-18 DIAGNOSIS — M62.89 PELVIC FLOOR DYSFUNCTION: Primary | ICD-10-CM

## 2025-03-18 PROCEDURE — 97112 NEUROMUSCULAR REEDUCATION: CPT | Mod: PO,CQ

## 2025-03-18 PROCEDURE — 97530 THERAPEUTIC ACTIVITIES: CPT | Mod: PO,CQ

## 2025-03-18 NOTE — PROGRESS NOTES
"    Outpatient Rehab    Physical Therapy Visit    Patient Name: Manpreet Kerr  MRN: [id number]  YOB: 1945  Encounter Date: 3/18/2025    Therapy Diagnosis:   Encounter Diagnoses   Name Primary?    Pelvic floor dysfunction Yes    Interstitial cystitis        Physician: Pepper Hensley FNP    Physician Orders: Eval and Treat    Medical Diagnosis: Interstitial cystitis     Visit # / Visits Authorized: 3/ 10   Date of Evaluation:  2/17/2025   Insurance Authorization Period: 2/10/2025 to 2/10/2026  Plan of Care Certification:  2/17/2025 to 5/12/2025                 Time In: 1345 am  Time Out: 1430  Total Time: 45  Total Billable Time: 45     Intake Outcome Measure for FOTO Survey     Therapist reviewed FOTO scores for Manpreet Kerr on 2/17/2025.   FOTO report - see Media section or FOTO account episode details.      Intake Score:  %         Subjective   Pt reports he is sleeping much better these days. He has been compliant with stretching at home. "My bladder is much happier these days.".  Pain reported as 0/10.      Objective            Treatment:       Assessment & Plan   Assessment:         Patient will continue to benefit from skilled outpatient physical therapy to address the deficits listed in the problem list box on initial evaluation, provide pt/family education and to maximize pt's level of independence in the home and community environment.     Patient's spiritual, cultural, and educational needs considered and patient agreeable to plan of care and goals.           Plan: Continue established POC     Goals:   Active       Physical Therapy       Physical Therapy Goal (Progressing)       Start:  02/17/25    Expected End:  05/12/25       Goals:  Short Term Goals: 6 weeks   - Pt will demonstrate excellent knowledge and adherence to HEP to facilitate optimal recovery.  - Pt will demonstrate proper PFM contraction, relaxation, and lengthening coordinated with TA and breath for improved muscle coordination " needed for functional activity.    Long Term Goals: 12 weeks   - Pt will demonstrate excellent knowledge and adherence to HEP for continued self-maintenance of symptoms.  - Pt will report voiding interval of 2-3 hours for improved ADL tolerance.  - Pt will report no incidence of urinary or fecal  incontinence 7/7 days for improved hygiene and ADL/IADL tolerance.   - Pt will report needing </= 0-1 pad/day indicating improved PFM function needed to maintain continence  - Pt will demonstrate PFM strength of at least 3/5 MMT for improved strength needed to maintain continence.   - Pt will report bearing down appropriately 100% of the time for improved bowel function and decreased stress on adjacent pelvic structures.   - Pt will demonstrate independence with pressure-management strategies to decreased stress on adjacent pelvic structures.                Jessica Hoffmann, PTA

## 2025-04-03 ENCOUNTER — PATIENT MESSAGE (OUTPATIENT)
Dept: UROLOGY | Facility: CLINIC | Age: 80
End: 2025-04-03

## 2025-04-03 ENCOUNTER — TELEPHONE (OUTPATIENT)
Dept: UROLOGY | Facility: CLINIC | Age: 80
End: 2025-04-03

## 2025-04-03 ENCOUNTER — OFFICE VISIT (OUTPATIENT)
Dept: UROLOGY | Facility: CLINIC | Age: 80
End: 2025-04-03
Payer: MEDICARE

## 2025-04-03 DIAGNOSIS — N40.0 BENIGN PROSTATIC HYPERPLASIA, UNSPECIFIED WHETHER LOWER URINARY TRACT SYMPTOMS PRESENT: ICD-10-CM

## 2025-04-03 DIAGNOSIS — N30.10 INTERSTITIAL CYSTITIS: ICD-10-CM

## 2025-04-03 DIAGNOSIS — N52.9 ERECTILE DYSFUNCTION, UNSPECIFIED ERECTILE DYSFUNCTION TYPE: ICD-10-CM

## 2025-04-03 LAB
BILIRUBIN, UA POC OHS: NEGATIVE
BLOOD, UA POC OHS: NEGATIVE
CLARITY, UA POC OHS: CLEAR
COLOR, UA POC OHS: YELLOW
GLUCOSE, UA POC OHS: NEGATIVE
KETONES, UA POC OHS: NEGATIVE
LEUKOCYTES, UA POC OHS: NEGATIVE
NITRITE, UA POC OHS: NEGATIVE
PH, UA POC OHS: 6.5
POC RESIDUAL URINE VOLUME: 32 ML (ref 0–100)
PROTEIN, UA POC OHS: NEGATIVE
SPECIFIC GRAVITY, UA POC OHS: 1.01
UROBILINOGEN, UA POC OHS: 0.2

## 2025-04-03 PROCEDURE — 99999PBSHW POCT BLADDER SCAN: Mod: PBBFAC,,,

## 2025-04-03 PROCEDURE — 99999 PR PBB SHADOW E&M-EST. PATIENT-LVL III: CPT | Mod: PBBFAC,,, | Performed by: UROLOGY

## 2025-04-03 PROCEDURE — 51798 US URINE CAPACITY MEASURE: CPT | Mod: PBBFAC,PO | Performed by: UROLOGY

## 2025-04-03 PROCEDURE — 99214 OFFICE O/P EST MOD 30 MIN: CPT | Mod: S$PBB,,, | Performed by: UROLOGY

## 2025-04-03 PROCEDURE — 99999PBSHW POCT URINALYSIS(INSTRUMENT): Mod: PBBFAC,,,

## 2025-04-03 PROCEDURE — 99213 OFFICE O/P EST LOW 20 MIN: CPT | Mod: PBBFAC,PO | Performed by: UROLOGY

## 2025-04-03 PROCEDURE — G2211 COMPLEX E/M VISIT ADD ON: HCPCS | Mod: S$PBB,,, | Performed by: UROLOGY

## 2025-04-03 PROCEDURE — 81003 URINALYSIS AUTO W/O SCOPE: CPT | Mod: PBBFAC,PO | Performed by: UROLOGY

## 2025-04-03 RX ORDER — TADALAFIL 5 MG/1
TABLET ORAL
Qty: 90 TABLET | Refills: 3 | Status: SHIPPED | OUTPATIENT
Start: 2025-04-03 | End: 2025-04-03

## 2025-04-03 RX ORDER — TADALAFIL 5 MG/1
TABLET ORAL
Qty: 90 TABLET | Refills: 3 | Status: SHIPPED | OUTPATIENT
Start: 2025-04-03

## 2025-04-03 RX ORDER — INCLISIRAN 284 MG/1.5ML
284 INJECTION, SOLUTION SUBCUTANEOUS
COMMUNITY

## 2025-04-03 RX ORDER — ITRACONAZOLE 100 MG/1
100 CAPSULE ORAL
COMMUNITY
Start: 2025-03-31

## 2025-04-03 NOTE — TELEPHONE ENCOUNTER
----- Message from Sandrita sent at 4/3/2025  2:58 PM CDT -----  Contact: Sandie [phone]  Type:  Pharmacy Calling to Clarify an RXName of Caller:  [facility]Ph[facility]  Tadalafil What do they need to clarify?:  directions Best Call Back Number:  [phone] Information:  Pls call back

## 2025-04-03 NOTE — PROGRESS NOTES
[facility] Note  Staff: MD Karrie  Referring: TONY Meadows, Primary Doctor      My chart: active    Chief Complaint: Follow-up      Subjective:        HPI: Manpreet Kerr is a 79 y.o. male     Interval history 3/23/21:  He has a h/o elevated PSA, prostate biopsy and US in in 2008, urinary retention x 2  In 2016, hypogonadism, requesting vas reversal, recurrent uti's.  Cysto trus on 8/12/19 showed b lobe hypertrophy, no stricture. 22g prostate. No IV median lobe. Ctu showed no obvious cause for uti's. Put him on cialis 5mg daily. Residuals improved to 30-50cc with better stream. 10/15/19 UF on tadalafil 5mg daily : avg flow 11, voided vol: 320, pvr by scan: 56. Repeat UF 2/20/20 peak flow 14.3, avg flow 7.9, voided vol: 257, pvr by scan: 25  At last visit 9/2020 he was found to have retention post knee surgery. Asked him to start flomax 0.4mg nightly again and then return for fill and pull. pvr was 25cc  Returns today and states urinating with issues. Took flomax for a few days to urinate last time and it worked but he feels bad when he takes it. Taking tadalafil 5mg once daily. Still on testosterone. No vasovasostomy. He's been using 20mg as needed for ED in addition to the 5mg daily (15mg additional).   AUA ssx:(0 incomplete emptying, 0 frequency, 0 intermittency, 0 urgency, 1 weak stream, 0 straining, 1 sleeping). 2. QOL: delighted  No psa since 11/2019, 0.48 then and he was 74 yo. ua with no blood or eluk No uti's since I last saw him    Interval history 3/28/22:  In 6/2021 and 12/2021 he had some itching, frequency and dysuria. He went to urgent care both times. He was told he had uti each time and he was given antibiotics. He thinks it's due to not drinking a enough. ua today: neg  He says his main issue is his oversensitivity to paprika and bladder which causes frequency, burning and incontinence.  He feels like he has a good urine flow. He is on low dose cialis 5mg  for bph. pvr by scan:42. He uses additional cialis for Ed (will take 20 as needed)  On androgel 3 pumps a day/his pcp from [facility] writes for this.     Interval history 4/24/23:  He returns to see me for BPH and ED which is managed with Cialis 5 mg daily with additional bumps up to 15 or 20 mg.  [facility]. he is happy on this dose.  His AUA symptom score is 5.  AUA ssx:(1 incomplete emptying, 2 frequency, 0 intermittency, 1 urgency, 0 weak stream, 0 straining, 0 sleeping). 5. QOL: pleased  He is also still on AndroGel 3 pumps a day from primary care at [facility].  And he says his IC is well-controlled with Prelief which he takes every day, 2 a day.  Has not tried any other supplements but did try elimination diet and it showed that paprika, dark sodas and honey all irritate his bladder.  He also had 2 cystoscopies 1 of which showed erythematous bladder and at 1st was going to get a biopsy but a follow-up cystoscopy showed a normalized after using Prelief daily.  However voided urine today does show nitrites and leukocytes.  He denies any urinary frequency urgency cloudy urine or other UTI symptoms.  He has not had a UTI in a while.  I have not seen him in year and no symptoms any year.    Interval history 4/9/2024:  He returns today for annual f/up and medication refills.  Pt with hx of BPH and ED which is managed with Cialis 5 mg daily with additional bumps up to 15 or 20 mg.  [facility]. he is happy on this dose.  His AUA symptom score is 5.  AUA ssx:(1 incomplete emptying, 2 frequency, 0 intermittency, 1 urgency, 0 weak stream, 0 straining, 0 sleeping). 5. QOL: pleased  He is also still on AndroGel 3 pumps a day from primary care at [facility].  And he says his IC is well-controlled with Prelief and Aloe-Vera at this time which he takes every day, 2 a day.  Has not tried any other supplements but did try elimination diet and it showed  "that paprika, dark sodas and honey all irritate his bladder.  He also had 2 cystoscopies 1 of which showed erythematous bladder and at 1st was going to get a biopsy but a follow-up cystoscopy showed a normalized after using Prelief daily.    Interval hx NP-O'Winnie 2/10/25:  Pt returns today for annual f/up and medication refills.  Pt with hx of BPH and ED which is managed with Cialis 5 mg PO Daily with additional "bumps" up to 15 or 20 mg prn per MD's original orders.  He is also still on AndroGel 3 pumps a day from primary care at [facility].  Hx IC:  Pt states his IC is well-controlled with Prelief and Aloe-Vera at this time which he takes every day, 2 a day.  But does still remain with irritative symptoms more often now if he eats anything spicy.   He also had 2 cystoscopies 1 of which showed erythematous bladder and at 1st was going to get a biopsy but a follow-up cystoscopy showed a normalized after using Prelief daily.  PT denies dysuria and gross hematuria symptoms today.  AUA SS: 3/1 Pleased (Urinary Frequency, Weak Stream, Nocturia-1)    Plan:  BPH with LUTS:  Cialis 5 mg PO Daily refilled for this pt on conclusion of ov today.  Pyridium 200 mg PO PRN bladder pain refilled today.  Both meds were sent to [facility] order pharmacy per pt request.     Chronic IC symptoms:  Ambulatory referral to Pelvic Floor PT was entered upon conclusion into ov today.  Pt requesting information to see if PTNS would help with his chronic IC/irritative symptoms.      Went to pelvic floor PT with [place] [facility] 2/26/25  Assessment: Tibial nerve stimulation for urgency was set up and reviewed today. Utilized with pelvic floor activation tasks for further benefit in reduction of urgency symptoms. Pt able to set up and take off unit safely before end of today's visit.      Interval history by ME/ in CLINIC (In-person) on 4/3/25:  History of Present Illness    CHIEF COMPLAINT:  Mr. Kerr presents for a " follow-up visit to discuss the effectiveness of posterior tibial nerve stimulation (PTNS) for his interstitial cystitis (IC) symptoms and to obtain a prescription for Cialis.    HPI:  Mr. Kerr with a history of interstitial cystitis (IC) returns for a follow-up visit after his last appointment with [facility] on 02/10/2025. He reports significant improvement in his IC symptoms following posterior tibial nerve stimulation (PTNS) therapy. He had been sensitive to spicy foods, particularly pepper and paprika, which triggered bladder irritation and frequent urination. He underwent PTNS therapy at [facility] in [place], completing 3-4 sessions over the past month.    After the first PTNS session, he noticed a marked improvement in his ability to tolerate spicy foods without bladder irritation. He can now consume foods containing paprika or pepper without symptoms. Prior to the treatment, he had flare-ups of bladder irritation and leakage a few times per week, depending on his diet. He had been avoiding trigger foods and carefully reading food labels to prevent symptom triggers.    In addition to PTNS, he underwent pelvic floor physical therapy, which included exercises and relaxation techniques. While he found these helpful for overall relaxation, he attributes the most significant improvement to the PTNS treatments. He was taught how to use a TENS unit at home to stimulate the posterior tibial nerve, which he did for 30 minutes about once a week.    He continues to take Prelief, which has helped with acidic foods and has reduced the occurrence of UTIs. He now only seems to get UTIs when traveling, which he attributes to dehydration during air travel.    He also mentions that he uses Cialis for erectile dysfunction (ED), which he obtains through a prescription from this provider as his  healthcare will not cover it for ED.    He denies chronic urinary frequency or urgency when not consuming spicy  foods.    PERTINENT MEDICATIONS:  Cialis, for ED (erectile dysfunction)  Peritum (mentioned but rarely used, only used twice)  Preleaf, for IC (interstitial cystitis) symptoms and UTI prevention  Aloe vera, for IC symptoms  AndroGel, prescribed by [facility]    PERTINENT MEDICAL HISTORY:  Interstitial cystitis (IC)  Erectile dysfunction (ED)    PERTINENT SURGICAL HISTORY:  Knee replacement: ~15 years ago, indication was knee trouble    PERTINENT TEST RESULTS:  Posterior Tibial Nerve Stimulation (PTNS): Started 2/26/2025, patient reported significant improvement in bladder sensitivity and response to trigger foods after 3-4 sessions       My notes:  H/o IC previously managed with prelief. Only had frequency, urgency and leakage during IC flares. Had flare ups if he ate something spicy. Sometimes a few x a week and some weeks once. Still taking prelief  He went for 2 months to pelvic floor pelvic floor.  Went for a month and a half. Asked about ptns.  They taught him how to use tens unit on his posterior tibial nerve.  He did at home once a week using tens pads for 30 minutes x 3.  He has had a signficinat improvement in his IC sx.   Taking cialis 5mg daily for ed.   Still on androgel by [facility]  Ua today neg  Pvr by scan: 32        Urine history:   4/3/25  neg  4/24/23 Nit+/tr leuk, pvr: 10  3/29/22 Neg, pvr by scan: 42  2/6/20  Tr leuk  10/15/19 neg/100 glucose  8/12/19 Ng, void: n/a  7/30/19 No cx, void:neg,   7/18/19 No cx, void:  tr leuk - on cipro 3rd day  10/16/18 No cx, void: neg, , mycoplasma and urea neg   9/1/18  Multiple org, void: sml leuk, 30-50 wbc  3/8/18  Pvr: 21 (fill a nd pull)   3/2018  Pvr by I&O: 700cc   10/29/17 No cx, void:  Neg  6/2/16  E.coli  6/7/16  Pvr: 11cc  5/31/16 pvr by I&O:  460cc   2/24/15 No cx, void:neg  1/7/15  E.coli res to amp, cipro, lev, tetra, void: neg  10/9/14 No cx, void: neg  4/9/14  No c,x void: neg  3/15/10 Ng, void:  neg  11/6/08 Multiple org    psa  history: MGF - had prostate cancer at a.84  3/23/21 DARRYL: 30g no nodules  8/12/19 Cysto trus 22.2g prostate with mod b lobe hypertrophy and no IV median lobe. pvr by aspiration: 60cc. Recommended urolift.   7/31/19 0.48  7/30/19 DARRYL: 30g, firm on right, pvr by scan: 30cc  7/18/19  pvr: 55cc. UF avg flow 2.9mL/s, voided volume: 37. Pvr: 63cc   10/16/18  pvr: 0  12/15/17 0.458  2016  0.31  2015  0.38  2014  0.5  1/16/12 0.56   1/5/11  0.36  1/6/10  0.30  2008  Negative prostate biopsy       REVIEW OF SYSTEMS:  As above    Allergies:  Adhesive, Amlodipine benzoate, Codeine, Morphine, and Sulfa (sulfonamide antibiotics)   Sulfa     Anticoagulation: Yes - asa 81mg daily    Objective:     There were no vitals filed for this visit.          Assessment:         Manpreet Kerr is a 79 y.o. male       1. Benign prostatic hyperplasia, unspecified whether lower urinary tract symptoms present    2. Interstitial cystitis    3. Erectile dysfunction, unspecified erectile dysfunction type           Plan:     's typed/written- Abbreviated/Short Plan:  Continue prelief and aloe as needed  Continue tens unit. Once a week for 12 weeks then once a month to maintain.   Fu in 3 months - want to see how he's doing on tens unit      The following assessment plan was created by [employer] via ambient listening:  Assessment & Plan    N40.0 Benign prostatic hyperplasia, unspecified whether lower urinary tract symptoms present  N30.10 Interstitial cystitis  N52.9 Erectile dysfunction, unspecified erectile dysfunction type    IMPRESSION:   Significant improvement in IC symptoms after using PTNS with a TENS unit at home, particularly in response to trigger foods.    Please review the short plan as above for concise and accurate plan. The dictated/AI generated plan may have some inaccuracies .    PLAN SUMMARY:   Continue aloe vera for IC symptom management   Continue TENS unit for PTNS at home: weekly for 12 weeks, then monthly  "for maintenance (30-minute sessions)   Continue AndroGel treatment managed by [facility]   Started Cialis prescription to be filled at [facility]   Continue Prelief for managing acidic foods, reducing UTI occurrences, and IC symptom management   Follow up in 3 months to assess efficacy of home PTNS treatment    INTERSTITIAL CYSTITIS:   Discussed how PTNS works to "distract" the bladder from trigger foods.   Mr. Kerr to continue using TENS unit for PTNS at home once weekly for 12 weeks, then monthly for maintenance, with 30-minute sessions.   Continued Prelief for managing acidic foods, reducing UTI occurrences, and IC symptom management.   Continued aloe vera for IC symptom management.   Follow up in 3 months to assess efficacy of home PTNS treatment.    ERECTILE DYSFUNCTION, UNSPECIFIED ERECTILE DYSFUNCTION TYPE:   Continued AndroGel treatment managed by [facility].   Started Cialis prescription to be filled at [facility].             Portions of this note was generated with the assistance of ambient listening technology. Verbal consent was obtained by the patient and accompanying visitor(s) for the recording of patient appointment to facilitate this note. I attest to having reviewed and edited the generated note for accuracy, though some syntax or spelling errors may persist. Please contact the author of this note for any clarification.    Visit today included increased complexity associated with the care of the episodic problem addressed and managing the longitudinal care of the patient due to the serious and/or complex managed problem(s)           Latia Yoon MD      "

## 2025-04-03 NOTE — PATIENT INSTRUCTIONS
"  1. Benign prostatic hyperplasia, unspecified whether lower urinary tract symptoms present    2. Interstitial cystitis    3. Erectile dysfunction, unspecified erectile dysfunction type           Plan:     's typed/written- Abbreviated/Short Plan:  Continue prelief and aloe as needed  Continue tens unit. Once a week for 12 weeks then once a month to maintain.   Fu in 3 months - want to see how he's doing on tens unit      The following assessment plan was created by [employer] via ambient listening:  Assessment & Plan    N40.0 Benign prostatic hyperplasia, unspecified whether lower urinary tract symptoms present  N30.10 Interstitial cystitis  N52.9 Erectile dysfunction, unspecified erectile dysfunction type    IMPRESSION:   Significant improvement in IC symptoms after using PTNS with a TENS unit at home, particularly in response to trigger foods.    Please review the short plan as above for concise and accurate plan. The dictated/AI generated plan may have some inaccuracies .    PLAN SUMMARY:   Continue aloe vera for IC symptom management   Continue TENS unit for PTNS at home: weekly for 12 weeks, then monthly for maintenance (30-minute sessions)   Continue And[facility]Gel treatment managed by [facility]   Started Cialis prescription to be filled at [facility]   Continue Prelief for managing acidic foods, reducing UTI occurrences, and IC symptom management   Follow up in 3 months to assess efficacy of home PTNS treatment    INTERSTITIAL CYSTITIS:   Discussed how PTNS works to "distract" the bladder from trigger foods.   Mr. Kerr to continue using TENS unit for PTNS at home once weekly for 12 weeks, then monthly for maintenance, with 30-minute sessions.   Continued Prelief for managing acidic foods, reducing UTI occurrences, and IC symptom management.   Continued aloe vera for IC symptom management.   Follow up in 3 months to assess efficacy of home PTNS treatment.    ERECTILE " DYSFUNCTION, UNSPECIFIED ERECTILE DYSFUNCTION TYPE:   Continued AndroGel treatment managed by [facility].   Started Cialis prescription to be filled at [facility].             Portions of this note was generated with the assistance of ambient listening technology. Verbal consent was obtained by the patient and accompanying visitor(s) for the recording of patient appointment to facilitate this note. I attest to having reviewed and edited the generated note for accuracy, though some syntax or spelling errors may persist. Please contact the author of this note for any clarification.

## 2025-06-18 ENCOUNTER — TELEPHONE (OUTPATIENT)
Dept: SPINE | Facility: CLINIC | Age: 80
End: 2025-06-18
Payer: MEDICARE

## 2025-06-25 ENCOUNTER — OFFICE VISIT (OUTPATIENT)
Dept: SPINE | Facility: CLINIC | Age: 80
End: 2025-06-25
Payer: MEDICARE

## 2025-06-25 ENCOUNTER — TELEPHONE (OUTPATIENT)
Dept: SPINE | Facility: CLINIC | Age: 80
End: 2025-06-25

## 2025-06-25 VITALS — BODY MASS INDEX: 35.42 KG/M2 | HEIGHT: 68 IN | WEIGHT: 233.69 LBS

## 2025-06-25 DIAGNOSIS — M54.12 CERVICAL RADICULITIS: ICD-10-CM

## 2025-06-25 DIAGNOSIS — G56.03 BILATERAL CARPAL TUNNEL SYNDROME: ICD-10-CM

## 2025-06-25 DIAGNOSIS — M54.2 CERVICALGIA: Primary | ICD-10-CM

## 2025-06-25 DIAGNOSIS — M54.12 CERVICAL RADICULITIS: Primary | ICD-10-CM

## 2025-06-25 PROCEDURE — 99999 PR PBB SHADOW E&M-EST. PATIENT-LVL III: CPT | Mod: PBBFAC,,, | Performed by: PHYSICAL MEDICINE & REHABILITATION

## 2025-06-25 PROCEDURE — 99213 OFFICE O/P EST LOW 20 MIN: CPT | Mod: PBBFAC,PN | Performed by: PHYSICAL MEDICINE & REHABILITATION

## 2025-06-25 PROCEDURE — 99204 OFFICE O/P NEW MOD 45 MIN: CPT | Mod: S$PBB,,, | Performed by: PHYSICAL MEDICINE & REHABILITATION

## 2025-06-25 NOTE — PROGRESS NOTES
SUBJECTIVE:    Patient ID: Manpreet Kerr is a 79 y.o. male.    Chief Complaint: Neck Pain    This is a 79-year-old man who sees Dr. Gilmore at [facility] for his primary care.  [employer].  History of hypertension and hyperlipidemia otherwise denies any chronic major medical problems.  No cancer history.  Presents with a primary complaint of numbness and tingling in all of the digits of the right greater than left hands.  He has a known history of carpal tunnel syndrome.  He did bring some medical records with him but I was not able to find that report.  At least not yet.  He also is complaining of some posterior neck discomfort.  Some difficulty with fine motor skills with prolonged use of his hands but nothing persistent.  No bowel or bladder dysfunction fever chills sweats or unexpected weight loss.  He has tried braces for his hand which did not help.  He saw a hand specialist who suggested that some of his symptoms may be related to his cervical spine so that is why he is here.  I only reviewed an MRI of the cervical spine done outside of [facility] on 05/13/2025 which shows multilevel degenerative disc disease with multilevel foraminal narrowing but no significant central canal stenosis.  Note the report is scanned into the media section of epic in its entirety along with an x-ray report of the cervical spine.  Current pain level is 0/10 but at times as high as 5/10 and interferes with his quality of life in terms of activities of daily living recreation and social activities.        Past Medical History:   Diagnosis Date    Allergy     Arthritis     BPH with obstruction/lower urinary tract symptoms     Environmental allergies     H/O prostatitis     Hepatic cyst 03/31/2024    History of urinary retention     [place] (hard of hearing)     WEARS BILATERAL HEARING AIDS    [place] (hard of hearing)     wears hearing aids    Hypertension     RUQ pain 03/31/2024    Sleep apnea     Urinary  "tract infection      Social History[1]  Past Surgical History:   Procedure Laterality Date    HERNIA REPAIR      INGUNAL     KNEE ARTHROPLASTY Left 8/25/2020    Procedure: ARTHROPLASTY, KNEE;  Surgeon: Max Garcia MD;  Location: [facility] OR;  Service: Orthopedics;  Laterality: Left;    TRANSRECTAL ULTRASOUND EXAMINATION N/A 8/12/2019    Procedure: TRANSRECTAL ULTRASOUND;  Surgeon: Latia Yoon MD;  [facility];  Service: Urology;  Laterality: N/A;    VASECTOMY      WISDOM TOOTH EXTRACTION       Family History   Problem Relation Name Age of Onset    Dementia Mother      Cancer Father          skin    Heart disease Father      Allergic rhinitis Neg Hx      Allergies Neg Hx      Angioedema Neg Hx      Asthma Neg Hx      Atopy Neg Hx      Eczema Neg Hx      Immunodeficiency Neg Hx      Rhinitis Neg Hx      Urticaria Neg Hx       Vitals:    06/25/25 1021   Weight: 106 kg (233 lb 11 oz)   Height: 5' 8" (1.727 m)       Review of Systems   Constitutional:  Negative for chills, diaphoresis, fatigue, fever and unexpected weight change.   HENT:  Negative for trouble swallowing.    Eyes:  Negative for visual disturbance.   Respiratory:  Negative for shortness of breath.    Cardiovascular:  Negative for chest pain.   Gastrointestinal:  Negative for abdominal pain, constipation, diarrhea, nausea and vomiting.   Genitourinary:  Negative for difficulty urinating.   Musculoskeletal:  Negative for arthralgias, back pain, gait problem, joint swelling, myalgias, neck pain and neck stiffness.   Neurological:  Negative for dizziness, speech difficulty, weakness, light-headedness, numbness and headaches.          Objective:      Physical Exam  Neurological:      Mental Status: He is alert and oriented to person, place, and time.      Comments: He is awake and in no acute distress  No point tenderness or palpable masses about cervical spine  Cervical range of motion is restricted in all planes with " mild discomfort at the endpoints of his range in all planes  Reflexes- +1-+2 reflexes at the following:   C5-Biceps   C6-Brachioradialis   C7-Triceps   L3/4-Patellar   S1-Achilles   Renny sign negative bilaterally  Tinel sign negative at both wrists  Phalen sign negative bilaterally  Strength testing- 5/5 strength in the following muscle groups:  C5-Elbow flexion  C6-Wrist extension  C7-Elbow extension  C8-Finger flexion  T1-Finger abduction  L2-Hip flexion  L3-Knee extension  L4-Ankle dorsiflexion  L5-Great toe extension  S1-Ankle plantar flexion                  Assessment:       1. Cervicalgia    2. Cervical radiculitis    3. Bilateral carpal tunnel syndrome           Plan:     He has a nonfocal neurological examination and no historical red flags.  I reassured him there are no worrisome findings on his MRI.  I think his clinical symptoms are most consistent with carpal tunnel syndrome plus or minus ulnar neuropathy.  Interestingly I am not able to reproduce his symptoms by examination.  Possible radicular component to the symptoms as well.  I am recommending a trial of interlaminar injections at C7-T1.  If he fails that consider updated EMG/nerve conduction studies.  Follow up with me after the procedure      Cervicalgia    Cervical radiculitis    Bilateral carpal tunnel syndrome                 [1]  Social History  Socioeconomic History    Marital status:    Tobacco Use    Smoking status: Former     Current packs/day: 0.00     Average packs/day: 1 pack/day for 10.0 years (10.0 ttl pk-yrs)     Types: Cigarettes     Start date: 4/3/1964     Quit date: 4/3/1974     Years since quittin.2    Smokeless tobacco: Never   Substance and Sexual Activity    Alcohol use: No    Drug use: No     Social Drivers of Health     Financial Resource Strain: Low Risk  (2024)    Overall Financial Resource Strain ([facility])     Difficulty of Paying Living Expenses: Not hard at all   Food Insecurity: No Food  Insecurity (4/8/2024)    Hunger Vital Sign     Worried About Running Out of Food in the Last Year: Never true     Ran Out of Food in the Last Year: Never true   Transportation Needs: No Transportation Needs (4/8/2024)    PRAPARE - Transportation     Lack of Transportation (Medical): No     Lack of Transportation (Non-Medical): No   Physical Activity: Sufficiently Active (2/17/2025)    Exercise Vital Sign     Days of Exercise per Week: 7 days     Minutes of Exercise per Session: 60 min   Stress: No Stress Concern Present (4/8/2024)    [employer] [place] [employer] - Occupational Stress Questionnaire     Feeling of Stress : Not at all   Housing Stability: Low Risk  (4/8/2024)    Housing Stability Vital Sign     Unable to Pay for Housing in the Last Year: No     Number of Places Lived in the Last Year: 1     Unstable Housing in the Last Year: No

## 2025-06-25 NOTE — H&P (VIEW-ONLY)
SUBJECTIVE:    Patient ID: Manpreet Kerr is a 79 y.o. male.    Chief Complaint: Neck Pain    This is a 79-year-old man who sees Dr. Gilmore at [facility] for his primary care.  [employer].  History of hypertension and hyperlipidemia otherwise denies any chronic major medical problems.  No cancer history.  Presents with a primary complaint of numbness and tingling in all of the digits of the right greater than left hands.  He has a known history of carpal tunnel syndrome.  He did bring some medical records with him but I was not able to find that report.  At least not yet.  He also is complaining of some posterior neck discomfort.  Some difficulty with fine motor skills with prolonged use of his hands but nothing persistent.  No bowel or bladder dysfunction fever chills sweats or unexpected weight loss.  He has tried braces for his hand which did not help.  He saw a hand specialist who suggested that some of his symptoms may be related to his cervical spine so that is why he is here.  I only reviewed an MRI of the cervical spine done outside of [facility] on 05/13/2025 which shows multilevel degenerative disc disease with multilevel foraminal narrowing but no significant central canal stenosis.  Note the report is scanned into the media section of epic in its entirety along with an x-ray report of the cervical spine.  Current pain level is 0/10 but at times as high as 5/10 and interferes with his quality of life in terms of activities of daily living recreation and social activities.        Past Medical History:   Diagnosis Date    Allergy     Arthritis     BPH with obstruction/lower urinary tract symptoms     Environmental allergies     H/O prostatitis     Hepatic cyst 03/31/2024    History of urinary retention     [place] (hard of hearing)     WEARS BILATERAL HEARING AIDS    [place] (hard of hearing)     wears hearing aids    Hypertension     RUQ pain 03/31/2024    Sleep apnea     Urinary  "tract infection      Social History[1]  Past Surgical History:   Procedure Laterality Date    HERNIA REPAIR      INGUNAL     KNEE ARTHROPLASTY Left 8/25/2020    Procedure: ARTHROPLASTY, KNEE;  Surgeon: Max Garcia MD;  [facility];  Service: Orthopedics;  Laterality: Left;    TRANSRECTAL ULTRASOUND EXAMINATION N/A 8/12/2019    Procedure: TRANSRECTAL ULTRASOUND;  Surgeon: Latia Yoon MD;  Location: [facility] OR;  Service: Urology;  Laterality: N/A;    VASECTOMY      WISDOM TOOTH EXTRACTION       Family History   Problem Relation Name Age of Onset    Dementia Mother      Cancer Father          skin    Heart disease Father      Allergic rhinitis Neg Hx      Allergies Neg Hx      Angioedema Neg Hx      Asthma Neg Hx      Atopy Neg Hx      Eczema Neg Hx      Immunodeficiency Neg Hx      Rhinitis Neg Hx      Urticaria Neg Hx       Vitals:    06/25/25 1021   Weight: 106 kg (233 lb 11 oz)   Height: 5' 8" (1.727 m)       Review of Systems   Constitutional:  Negative for chills, diaphoresis, fatigue, fever and unexpected weight change.   HENT:  Negative for trouble swallowing.    Eyes:  Negative for visual disturbance.   Respiratory:  Negative for shortness of breath.    Cardiovascular:  Negative for chest pain.   Gastrointestinal:  Negative for abdominal pain, constipation, diarrhea, nausea and vomiting.   Genitourinary:  Negative for difficulty urinating.   Musculoskeletal:  Negative for arthralgias, back pain, gait problem, joint swelling, myalgias, neck pain and neck stiffness.   Neurological:  Negative for dizziness, speech difficulty, weakness, light-headedness, numbness and headaches.          Objective:      Physical Exam  Neurological:      Mental Status: He is alert and oriented to person, place, and time.      Comments: He is awake and in no acute distress  No point tenderness or palpable masses about cervical spine  Cervical range of motion is restricted in all planes with " mild discomfort at the endpoints of his range in all planes  Reflexes- +1-+2 reflexes at the following:   C5-Biceps   C6-Brachioradialis   C7-Triceps   L3/4-Patellar   S1-Achilles   Renny sign negative bilaterally  Tinel sign negative at both wrists  Phalen sign negative bilaterally  Strength testing- 5/5 strength in the following muscle groups:  C5-Elbow flexion  C6-Wrist extension  C7-Elbow extension  C8-Finger flexion  T1-Finger abduction  L2-Hip flexion  L3-Knee extension  L4-Ankle dorsiflexion  L5-Great toe extension  S1-Ankle plantar flexion                  Assessment:       1. Cervicalgia    2. Cervical radiculitis    3. Bilateral carpal tunnel syndrome           Plan:     He has a nonfocal neurological examination and no historical red flags.  I reassured him there are no worrisome findings on his MRI.  I think his clinical symptoms are most consistent with carpal tunnel syndrome plus or minus ulnar neuropathy.  Interestingly I am not able to reproduce his symptoms by examination.  Possible radicular component to the symptoms as well.  I am recommending a trial of interlaminar injections at C7-T1.  If he fails that consider updated EMG/nerve conduction studies.  Follow up with me after the procedure      Cervicalgia    Cervical radiculitis    Bilateral carpal tunnel syndrome                 [1]  Social History  Socioeconomic History    Marital status:    Tobacco Use    Smoking status: Former     Current packs/day: 0.00     Average packs/day: 1 pack/day for 10.0 years (10.0 ttl pk-yrs)     Types: Cigarettes     Start date: 4/3/1964     Quit date: 4/3/1974     Years since quittin.2    Smokeless tobacco: Never   Substance and Sexual Activity    Alcohol use: No    Drug use: No     Social Drivers of Health     Financial Resource Strain: Low Risk  (2024)    Overall Financial Resource Strain ([facility])     Difficulty of Paying Living Expenses: Not hard at all   Food Insecurity: No Food  Insecurity (4/8/2024)    Hunger Vital Sign     Worried About Running Out of Food in the Last Year: Never true     Ran Out of Food in the Last Year: Never true   Transportation Needs: No Transportation Needs (4/8/2024)    PRAPARE - Transportation     Lack of Transportation (Medical): No     Lack of Transportation (Non-Medical): No   Physical Activity: Sufficiently Active (2/17/2025)    Exercise Vital Sign     Days of Exercise per Week: 7 days     Minutes of Exercise per Session: 60 min   Stress: No Stress Concern Present (4/8/2024)    [employer] - Occupational Stress Questionnaire     Feeling of Stress : Not at all   Housing Stability: Low Risk  (4/8/2024)    Housing Stability Vital Sign     Unable to Pay for Housing in the Last Year: No     Number of Places Lived in the Last Year: 1     Unstable Housing in the Last Year: No

## 2025-06-25 NOTE — TELEPHONE ENCOUNTER
----- Message from Chester Cheney MD sent at 6/25/2025 10:42 AM CDT -----  Please schedule for interlaminar injection C7-T1 any provider

## 2025-07-21 ENCOUNTER — TELEPHONE (OUTPATIENT)
Dept: PAIN MEDICINE | Facility: CLINIC | Age: 80
End: 2025-07-21
Payer: MEDICARE

## 2025-07-21 NOTE — TELEPHONE ENCOUNTER
Patient informed of MD's response and advised not to take medication today. Patient voiced understanding.

## 2025-07-21 NOTE — TELEPHONE ENCOUNTER
Type:  Needs Medical Advice    Who Called: patient  Symptoms (please be specific):    How long has patient had these symptoms:    Pharmacy name and phone #:    Would the patient rather a call back or a response via [facility]? call  Best Call Back Number: [phone]    Additional Information: please call patient as he took a medication he was not suppose to take due to surgery. Please call and advise. thanks

## 2025-07-22 ENCOUNTER — HOSPITAL ENCOUNTER (OUTPATIENT)
Facility: HOSPITAL | Age: 80
Discharge: HOME | End: 2025-07-22
Attending: ANESTHESIOLOGY | Admitting: ANESTHESIOLOGY
Payer: MEDICARE

## 2025-07-22 DIAGNOSIS — M54.12 CERVICAL RADICULITIS: ICD-10-CM

## 2025-07-22 PROCEDURE — A4216 STERILE WATER/SALINE, 10 ML: HCPCS | Performed by: ANESTHESIOLOGY

## 2025-07-22 PROCEDURE — 25500020 PHARM REV CODE 255: Performed by: ANESTHESIOLOGY

## 2025-07-22 PROCEDURE — 62321 NJX INTERLAMINAR CRV/THRC: CPT | Mod: ,,, | Performed by: ANESTHESIOLOGY

## 2025-07-22 PROCEDURE — 63600175 PHARM REV CODE 636 W HCPCS: Performed by: ANESTHESIOLOGY

## 2025-07-22 PROCEDURE — 62321 NJX INTERLAMINAR CRV/THRC: CPT | Performed by: ANESTHESIOLOGY

## 2025-07-22 PROCEDURE — 25000003 PHARM REV CODE 250: Performed by: ANESTHESIOLOGY

## 2025-07-22 RX ORDER — DEXAMETHASONE SODIUM PHOSPHATE 10 MG/ML
INJECTION, SOLUTION INTRA-ARTICULAR; INTRALESIONAL; INTRAMUSCULAR; INTRAVENOUS; SOFT TISSUE
Status: DISCONTINUED | OUTPATIENT
Start: 2025-07-22 | End: 2025-07-22 | Stop reason: HOSPADM

## 2025-07-22 RX ORDER — LIDOCAINE HYDROCHLORIDE 10 MG/ML
1 INJECTION, SOLUTION EPIDURAL; INFILTRATION; INTRACAUDAL; PERINEURAL ONCE
Status: COMPLETED | OUTPATIENT
Start: 2025-07-22 | End: 2025-07-22

## 2025-07-22 RX ORDER — FENTANYL CITRATE 50 UG/ML
INJECTION, SOLUTION INTRAMUSCULAR; INTRAVENOUS
Status: DISCONTINUED | OUTPATIENT
Start: 2025-07-22 | End: 2025-07-22 | Stop reason: HOSPADM

## 2025-07-22 RX ORDER — MIDAZOLAM HYDROCHLORIDE 1 MG/ML
INJECTION INTRAMUSCULAR; INTRAVENOUS
Status: DISCONTINUED | OUTPATIENT
Start: 2025-07-22 | End: 2025-07-22 | Stop reason: HOSPADM

## 2025-07-22 RX ORDER — SODIUM CHLORIDE 9 MG/ML
INJECTION, SOLUTION INTRAMUSCULAR; INTRAVENOUS; SUBCUTANEOUS
Status: DISCONTINUED | OUTPATIENT
Start: 2025-07-22 | End: 2025-07-22 | Stop reason: HOSPADM

## 2025-07-22 RX ORDER — SODIUM CHLORIDE, SODIUM LACTATE, POTASSIUM CHLORIDE, CALCIUM CHLORIDE 600; 310; 30; 20 MG/100ML; MG/100ML; MG/100ML; MG/100ML
INJECTION, SOLUTION INTRAVENOUS CONTINUOUS
Status: DISCONTINUED | OUTPATIENT
Start: 2025-07-22 | End: 2025-07-22 | Stop reason: HOSPADM

## 2025-07-22 RX ORDER — LIDOCAINE HYDROCHLORIDE 10 MG/ML
INJECTION, SOLUTION EPIDURAL; INFILTRATION; INTRACAUDAL; PERINEURAL
Status: DISCONTINUED | OUTPATIENT
Start: 2025-07-22 | End: 2025-07-22 | Stop reason: HOSPADM

## 2025-07-22 RX ADMIN — LIDOCAINE HYDROCHLORIDE 10 MG: 10 INJECTION, SOLUTION EPIDURAL; INFILTRATION; INTRACAUDAL at 09:07

## 2025-07-22 NOTE — OP NOTE
PROCEDURE DATE: 7/22/2025    Procedure: C7-T1 cervical interlaminar epidural steroid injection under utilizing fluoroscopy.    Diagnosis: Cervical Degenerative Disc Diease; Cervical Radiculitis  POSTOP DIAGNOSIS: SAME    Physician: Manpreet Aguilar MD    Medications injected:  Dexamethasone 10mg followed by a slow injection of 4mL sterile, preservative-free normal saline.    Local anesthetic used: Lidocaine 1%, 2 ml.    Sedation Medications: RN IV sedation    Complications:  None     Estimated blood loss: None    Technique:  A time-out was taken to identify patient and procedure prior to starting the procedure.  With the patient laying in a prone position with the neck in a mid-flexed forward position, the area was prepped and draped in the usual sterile fashion using ChloraPrep and a fenestrated drape.  The area was determined under AP fluoroscopic guidance.  Local anesthetic was given using a 25-gauge 1.5 inch needle by raising a wheal and then infiltrating ventrally.  A 3.5 inch 20-gauge Touhy needle was introduced under fluoroscopic guidance to meet the lamina of C7.  The needle was then hinged under the lamina then advanced using loss of resistance technique.  Once the tip of the needle was in the desired position, the 1ml contrast dye  was injected to determine placement and no uptake.  The steroid was then injected slowly followed by a slow injection of 4 mL of the sterile preservative-free normal saline.  The patient tolerated the procedure well.    The patient was monitored after the procedure and was given post-procedure and discharge instructions to follow at home. The patient was discharged in a stable condition.

## 2025-07-22 NOTE — DISCHARGE SUMMARY
[facility] - Periop Services  Discharge Note  Short Stay    Procedure(s) (LRB):  Injection-steroid-epidural-thoracic C7-T1 (N/A)      OUTCOME: Patient tolerated treatment/procedure well without complication and is now ready for discharge.    DISPOSITION: Home or Self Care    FINAL DIAGNOSIS:  <principal problem not specified>    FOLLOWUP: In clinic    DISCHARGE INSTRUCTIONS:    Discharge Procedure Orders   Notify your health care provider if you experience any of the following:  temperature >100.4     Notify your health care provider if you experience any of the following:  severe uncontrolled pain     Notify your health care provider if you experience any of the following:  redness, tenderness, or signs of infection (pain, swelling, redness, odor or green/yellow discharge around incision site)     Activity as tolerated        TIME SPENT ON DISCHARGE: 30 minutes

## 2025-07-24 VITALS
SYSTOLIC BLOOD PRESSURE: 118 MMHG | DIASTOLIC BLOOD PRESSURE: 67 MMHG | BODY MASS INDEX: 35.42 KG/M2 | HEART RATE: 53 BPM | OXYGEN SATURATION: 95 % | TEMPERATURE: 98 F | WEIGHT: 233.69 LBS | RESPIRATION RATE: 20 BRPM | HEIGHT: 68 IN

## 2025-08-05 ENCOUNTER — OFFICE VISIT (OUTPATIENT)
Dept: UROLOGY | Facility: CLINIC | Age: 80
End: 2025-08-05
Payer: MEDICARE

## 2025-08-05 DIAGNOSIS — N52.9 ERECTILE DYSFUNCTION, UNSPECIFIED ERECTILE DYSFUNCTION TYPE: ICD-10-CM

## 2025-08-05 DIAGNOSIS — N30.10 INTERSTITIAL CYSTITIS: Primary | ICD-10-CM

## 2025-08-05 LAB
BILIRUBIN, UA POC OHS: NEGATIVE
BLOOD, UA POC OHS: NEGATIVE
CLARITY, UA POC OHS: CLEAR
COLOR, UA POC OHS: YELLOW
GLUCOSE, UA POC OHS: NEGATIVE
KETONES, UA POC OHS: NEGATIVE
LEUKOCYTES, UA POC OHS: NEGATIVE
NITRITE, UA POC OHS: NEGATIVE
PH, UA POC OHS: 5.5
POC RESIDUAL URINE VOLUME: 5 ML (ref 0–100)
PROTEIN, UA POC OHS: NEGATIVE
SPECIFIC GRAVITY, UA POC OHS: 1.01
UROBILINOGEN, UA POC OHS: 0.2

## 2025-08-05 PROCEDURE — 99213 OFFICE O/P EST LOW 20 MIN: CPT | Mod: PBBFAC,PO,25 | Performed by: UROLOGY

## 2025-08-05 PROCEDURE — 99999 PR PBB SHADOW E&M-EST. PATIENT-LVL III: CPT | Mod: PBBFAC,,, | Performed by: UROLOGY

## 2025-08-05 PROCEDURE — 99999PBSHW POCT BLADDER SCAN: Mod: PBBFAC,,,

## 2025-08-05 PROCEDURE — 99999PBSHW POCT URINALYSIS(INSTRUMENT): Mod: PBBFAC,,,

## 2025-08-05 PROCEDURE — 99214 OFFICE O/P EST MOD 30 MIN: CPT | Mod: S$PBB,,, | Performed by: UROLOGY

## 2025-08-05 PROCEDURE — 51798 US URINE CAPACITY MEASURE: CPT | Mod: PBBFAC,PO | Performed by: UROLOGY

## 2025-08-05 PROCEDURE — 81003 URINALYSIS AUTO W/O SCOPE: CPT | Mod: PBBFAC,PO | Performed by: UROLOGY

## 2025-08-05 PROCEDURE — G2211 COMPLEX E/M VISIT ADD ON: HCPCS | Mod: ,,, | Performed by: UROLOGY

## 2025-08-05 NOTE — PROGRESS NOTES
[facility] Note  Staff: MD Karrie  Referring: TONY Meadows, Primary Doctor      My chart: active    Chief Complaint: Follow-up      Subjective:        HPI: Manpreet Kerr is a 79 y.o. male     Interval history 3/23/21:  He has a h/o elevated PSA, prostate biopsy and US in in 2008, urinary retention x 2  In 2016, hypogonadism, requesting vas reversal, recurrent uti's.  Cysto trus on 8/12/19 showed b lobe hypertrophy, no stricture. 22g prostate. No IV median lobe. Ctu showed no obvious cause for uti's. Put him on cialis 5mg daily. Residuals improved to 30-50cc with better stream. 10/15/19 UF on tadalafil 5mg daily : avg flow 11, voided vol: 320, pvr by scan: 56. Repeat UF 2/20/20 peak flow 14.3, avg flow 7.9, voided vol: 257, pvr by scan: 25  At last visit 9/2020 he was found to have retention post knee surgery. Asked him to start flomax 0.4mg nightly again and then return for fill and pull. pvr was 25cc  Returns today and states urinating with issues. Took flomax for a few days to urinate last time and it worked but he feels bad when he takes it. Taking tadalafil 5mg once daily. Still on testosterone. No vasovasostomy. He's been using 20mg as needed for ED in addition to the 5mg daily (15mg additional).   AUA ssx:(0 incomplete emptying, 0 frequency, 0 intermittency, 0 urgency, 1 weak stream, 0 straining, 1 sleeping). 2. QOL: delighted  No psa since 11/2019, 0.48 then and he was 72 yo. ua with no blood or eluk No uti's since I last saw him    Interval history 3/28/22:  In 6/2021 and 12/2021 he had some itching, frequency and dysuria. He went to urgent care both times. He was told he had uti each time and he was given antibiotics. He thinks it's due to not drinking a enough. ua today: neg  He says his main issue is his oversensitivity to paprika and bladder which causes frequency, burning and incontinence.  He feels like he has a good urine flow. He is on low dose cialis 5mg  for bph. pvr by scan:42. He uses additional cialis for Ed (will take 20 as needed)  On androgel 3 pumps a day/his pcp from [facility] writes for this.     Interval history 4/24/23:  He returns to see me for BPH and ED which is managed with Cialis 5 mg daily with additional bumps up to 15 or 20 mg.  [facility]. he is happy on this dose.  His AUA symptom score is 5.  AUA ssx:(1 incomplete emptying, 2 frequency, 0 intermittency, 1 urgency, 0 weak stream, 0 straining, 0 sleeping). 5. QOL: pleased  He is also still on AndroGel 3 pumps a day from primary care at [facility].  And he says his IC is well-controlled with Prelief which he takes every day, 2 a day.  Has not tried any other supplements but did try elimination diet and it showed that paprika, dark sodas and honey all irritate his bladder.  He also had 2 cystoscopies 1 of which showed erythematous bladder and at 1st was going to get a biopsy but a follow-up cystoscopy showed a normalized after using Prelief daily.  However voided urine today does show nitrites and leukocytes.  He denies any urinary frequency urgency cloudy urine or other UTI symptoms.  He has not had a UTI in a while.  I have not seen him in year and no symptoms any year.    Interval history 4/9/2024:  He returns today for annual f/up and medication refills.  Pt with hx of BPH and ED which is managed with Cialis 5 mg daily with additional bumps up to 15 or 20 mg.  He received a [facility]. he is happy on this dose.  His AUA symptom score is 5.  AUA ssx:(1 incomplete emptying, 2 frequency, 0 intermittency, 1 urgency, 0 weak stream, 0 straining, 0 sleeping). 5. QOL: pleased  He is also still on AndroGel 3 pumps a day from primary care at [facility].  And he says his IC is well-controlled with Prelief and Aloe-Vera at this time which he takes every day, 2 a day.  Has not tried any other supplements but did try elimination diet and it showed  "that paprika, dark sodas and honey all irritate his bladder.  He also had 2 cystoscopies 1 of which showed erythematous bladder and at 1st was going to get a biopsy but a follow-up cystoscopy showed a normalized after using Prelief daily.    Interval hx NP-O'Winnie 2/10/25:  Pt returns today for annual f/up and medication refills.  Pt with hx of BPH and ED which is managed with Cialis 5 mg PO Daily with additional "bumps" up to 15 or 20 mg prn per MD's original orders.  He is also still on AndroGel 3 pumps a day from primary care at [facility].  Hx IC:  Pt states his IC is well-controlled with Prelief and Aloe-Vera at this time which he takes every day, 2 a day.  But does still remain with irritative symptoms more often now if he eats anything spicy.   He also had 2 cystoscopies 1 of which showed erythematous bladder and at 1st was going to get a biopsy but a follow-up cystoscopy showed a normalized after using Prelief daily.  PT denies dysuria and gross hematuria symptoms today.  AUA SS: 3/1 Pleased (Urinary Frequency, Weak Stream, Nocturia-1)    Plan:  BPH with LUTS:  Cialis 5 mg PO Daily refilled for this pt on conclusion of ov today.  Pyridium 200 mg PO PRN bladder pain refilled today.  Both meds were sent to [place] order pharmacy per pt request.     Chronic IC symptoms:  Ambulatory referral to Pelvic Floor PT was entered upon conclusion into ov today.  Pt requesting information to see if PTNS would help with his chronic IC/irritative symptoms.      Went to pelvic floor PT with [facility] 2/26/25  Assessment: Tibial nerve stimulation for urgency was set up and reviewed today. Utilized with pelvic floor activation tasks for further benefit in reduction of urgency symptoms. Pt able to set up and take off unit safely before end of today's visit.      Interval history by ME/ in CLINIC (In-person) on 4/3/25:  Mr. Kerr with a history of interstitial cystitis (IC) returns for a follow-up " visit after his last appointment with [facility] on 02/10/2025. He reports significant improvement in his IC symptoms following posterior tibial nerve stimulation (PTNS) therapy. He had been sensitive to spicy foods, particularly pepper and paprika, which triggered bladder irritation and frequent urination. He underwent PTNS therapy at [facility] in [place], completing 3-4 sessions over the past month.  After the first PTNS session, he noticed a marked improvement in his ability to tolerate spicy foods without bladder irritation. He can now consume foods containing paprika or pepper without symptoms. Prior to the treatment, he had flare-ups of bladder irritation and leakage a few times per week, depending on his diet. He had been avoiding trigger foods and carefully reading food labels to prevent symptom triggers.  In addition to PTNS, he underwent pelvic floor physical therapy, which included exercises and relaxation techniques. While he found these helpful for overall relaxation, he attributes the most significant improvement to the PTNS treatments. He was taught how to use a TENS unit at home to stimulate the posterior tibial nerve, which he did for 30 minutes about once a week.  He continues to take Prelief, which has helped with acidic foods and has reduced the occurrence of UTIs. He now only seems to get UTIs when traveling, which he attributes to dehydration during air travel.  He also mentions that he uses Cialis for erectile dysfunction (ED), which he obtains through a prescription from this provider as his  healthcare will not cover it for ED.  He denies chronic urinary frequency or urgency when not consuming spicy foods.    My notes:  H/o IC previously managed with prelief. Only had frequency, urgency and leakage during IC flares. Had flare ups if he ate something spicy. Sometimes a few x a week and some weeks once. Still taking prelief  He went for 2 months to pelvic floor pelvic floor.  Went for a  month and a half. Asked about ptns.  They taught him how to use tens unit on his posterior tibial nerve.  He did at home once a week using tens pads for 30 minutes x 3.  He has had a signficinat improvement in his IC sx.   Taking cialis 5mg daily for ed.   Still on androgel by esteban Hopkins today neg  Pvr by scan: 32    Interval history by ME/ in CLINIC (In-person) on 8/5/25:  History of Present Illness    CHIEF COMPLAINT:  Mr. Kerr presents for follow-up of urinary symptoms and management of erectile dysfunction (ED).    HPI:  Mr. Kerr has a history of urinary symptoms, including overactive bladder and leakage. He had an exacerbation of bladder symptoms following knee surgery, resulting in urinary leakage for a month. Mr. Kerr is currently taking 10 units of medication once a week for his urinary symptoms, which has been effective in preventing exacerbations.    Two weeks ago, patient underwent a spinal injection in the lower cervical region due to nerve irritation and compression causing problems with his hands, including tingling. Following this procedure, which involved sedation, patient had some urinary leakage. He self-treated with Flomax for a week, which resolved the leakage but caused side effects that made him feel unwell.    Mr. Kerr currently urinates every 2-3 hours. He follows an interstitial cystitis (IC) diet and has identified several dietary triggers that exacerbate his bladder symptoms, including carbonation, spicy foods, caffeine, and sulfites. He notes issues with grapes, especially those imported from [place], which often contain sulfites that aggravate his symptoms.    For symptom management, patient takes PreLeaf daily to lower urine acidity and [place] aloe vera (3 capsules daily) for inflammation. These supplements have significantly improved his urinary symptoms.    Mr. Kerr also has erectile dysfunction (ED) and takes Cialis 5mg daily, with an  additional 3mg as needed, up to 2 times per week.    Mr. Kerr is under the care of a urologist at [facility], though this doctor may be leaving soon due to rotations and changes in the [employer] policies.    Mr. Kerr denies any current exacerbations or pain episodes related to his urinary symptoms.    PERTINENT MEDICATIONS:  Cialis 5 mg, daily, for ED  Cialis 20 mg, PRN up to 2 times per week, for ED  PreLeaf, daily, to lower urine acidity  [place] Aloe Vera, 3 capsules daily, for bladder inflammation  Discontinued Flomax after using it for 1 week due to side effects    PERTINENT MEDICAL HISTORY:  Overactive bladder  Erectile dysfunction (ED)  Carpal tunnel syndrome  Interstitial cystitis (IC)    PERTINENT SURGICAL HISTORY:  Knee surgery: Date not specified, indication not specified. Complication: exacerbation of bladder symptoms and urinary leakage for 1 month after.  Spinal injection: 2 weeks prior to visit, in lower cervical region for irritated/compressed nerves. Mr. Kerr was sedated and had some leaking afterwards.    PERTINENT TEST RESULTS:  Urinalysis: Recent, negative MRI Cervical Spine: Recent, showed nerve irritation/compression in lower part of neck             My notes:  Still using tens unit once a week. Hasn't had any recurrence of oab except after his spinal procedure when he had some leaking. Took flomax for a few days which helped no longer taking flomax and no leaking.  Otherwise urinating every 2 hours without urgency.   Taking aloe vera [place] 4 a day and prelief daily   Still seeing urologist at [facility].   Urine today is neg. Pvr by scan: 5  Still taking cialis 5mg daily for Ed and 20mg total prn up to 2x a week in addition  which works for him.  Following ic diet- carbonation, caffeine and spicy foods  AUA ssx:(1 incomplete emptying, 1 frequency, 0 intermittency, 3 urgency, 1 weak stream, 0 straining, 1 sleeping). 7. QOL: mostly satisfie      Urine history:    8/5/25  neg  4/3/25  neg  4/24/23 Nit+/tr leuk, pvr: 10  3/29/22 Neg, pvr by scan: 42  2/6/20  Tr leuk  10/15/19 neg/100 glucose  8/12/19 Ng, void: n/a  7/30/19 No cx, void:neg,   7/18/19 No cx, void:  tr leuk - on cipro 3rd day  10/16/18 No cx, void: neg, , mycoplasma and urea neg   9/1/18  Multiple org, void: sml leuk, 30-50 wbc  3/8/18  Pvr: 21 (fill a nd pull)   3/2018  Pvr by I&O: 700cc   10/29/17 No cx, void:  Neg  6/2/16  E.coli  6/7/16  Pvr: 11cc  5/31/16 pvr by I&O:  460cc   2/24/15 No cx, void:neg  1/7/15  E.coli res to amp, cipro, lev, tetra, void: neg  10/9/14 No cx, void: neg  4/9/14  No c,x void: neg  3/15/10 Ng, void:  neg  11/6/08 Multiple org    psa history: MGF - had prostate cancer at a.84  3/23/21 DARRYL: 30g no nodules  8/12/19 Cysto trus 22.2g prostate with mod b lobe hypertrophy and no IV median lobe. pvr by aspiration: 60cc. Recommended urolift.   7/31/19 0.48  7/30/19 DARRYL: 30g, firm on right, pvr by scan: 30cc  7/18/19  pvr: 55cc. UF avg flow 2.9mL/s, voided volume: 37. Pvr: 63cc   10/16/18  pvr: 0  12/15/17 0.458  2016  0.31  2015  0.38  2014  0.5  1/16/12 0.56   1/5/11  0.36  1/6/10  0.30  2008  Negative prostate biopsy       REVIEW OF SYSTEMS:  As above    Allergies:  Adhesive, Amlodipine benzoate, Codeine, Morphine, and Sulfa (sulfonamide antibiotics)   Sulfa     Anticoagulation: Yes - asa 81mg daily    Objective:     There were no vitals filed for this visit.          Assessment:         Manpreet Kerr is a 79 y.o. male       1. Interstitial cystitis    2. Erectile dysfunction, unspecified erectile dysfunction type           Plan:     's typed/written- Abbreviated/Short Plan:  IC - flareups (frequency, spasms, incontinence and pain)   managed with tens unit once a week, aloe vera and [place] aloe vera  Cont prelief as needed prior to certain foods and [place] aloe vera daily.   ED- continue tadalafil 5mg daily and 20mg in addition as needed.    Fu in 6  months with urology np for sx check and fu with me if having worsening pain.     The following assessment plan was created by [employer] via ambient listening:  Assessment & Plan    N30.10 Interstitial cystitis  N52.9 Erectile dysfunction, unspecified erectile dysfunction type    IMPRESSION:   Reviewed current medication regimen, including Cialis for ED and intermittent use of Flomax for urinary symptoms.   Assessed effectiveness of current IC management strategy, including use of PreLeaf and [place] aloe vera supplements.   Considered history of urinary symptoms, including recent episode related to spinal injection and sedation.   Evaluated adherence to IC diet and identified specific dietary triggers.    Please review the short plan as above for concise and accurate plan. The dictated/AI generated plan may have some inaccuracies .    PLAN SUMMARY:   Continue Cialis 5 mg daily for ED   Use Cialis 20 mg PRN up to 2 times per week for ED   Follow up in 6 months for symptom check   Contact office if pain worsens    INTERSTITIAL CYSTITIS:   Follow up in 6 months for symptom check.   Contact the office if experiencing worsening pain.    ERECTILE DYSFUNCTION, UNSPECIFIED ERECTILE DYSFUNCTION TYPE:   Continue Cialis 5 mg daily and 20 mg total PRN up to 2 times per week for ED.         This note was generated with the assistance of ambient listening technology. Verbal consent was obtained by the patient and accompanying visitor(s) for the recording of patient appointment to facilitate this note. I attest to having reviewed and edited the generated note for accuracy, though some syntax or spelling errors may persist. Please contact the author of this note for any clarification.      Visit today included increased complexity associated with the care of the episodic problem addressed and managing the longitudinal care of the patient due to the serious and/or complex managed problem(s)               Latia GOODWIN  MD Karrie

## 2025-08-08 ENCOUNTER — OFFICE VISIT (OUTPATIENT)
Dept: SPINE | Facility: CLINIC | Age: 80
End: 2025-08-08
Payer: MEDICARE

## 2025-08-08 VITALS — HEIGHT: 68 IN | BODY MASS INDEX: 35.42 KG/M2 | WEIGHT: 233.69 LBS

## 2025-08-08 DIAGNOSIS — M54.12 CERVICAL RADICULITIS: Primary | ICD-10-CM

## 2025-08-08 DIAGNOSIS — M54.2 CERVICALGIA: ICD-10-CM

## 2025-08-08 PROCEDURE — 99999 PR PBB SHADOW E&M-EST. PATIENT-LVL III: CPT | Mod: PBBFAC,,, | Performed by: PHYSICAL MEDICINE & REHABILITATION

## 2025-08-08 PROCEDURE — 99213 OFFICE O/P EST LOW 20 MIN: CPT | Mod: PBBFAC,PN | Performed by: PHYSICAL MEDICINE & REHABILITATION

## 2025-08-08 NOTE — PROGRESS NOTES
SUBJECTIVE:    Patient ID: Manpreet Kerr is a 79 y.o. male.    Chief Complaint: Follow-up    He is here for follow-up status post interlaminar injection C7-T1 on 07/22/2025 with Dr. Aguilar.  An excellent response to that procedure.  He describes 90% improvement in his neck pain and bilateral hand tingling with improved functional mobility.  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  Pain level currently is 2/10.          Past Medical History:   Diagnosis Date    Allergy     Arthritis     BPH with obstruction/lower urinary tract symptoms     Environmental allergies     H/O prostatitis     Hepatic cyst 03/31/2024    History of urinary retention     [place] (hard of hearing)     WEARS BILATERAL HEARING AIDS    [place] (hard of hearing)     wears hearing aids    Hypertension     RUQ pain 03/31/2024    Sleep apnea     Urinary tract infection      Social History[1]  Past Surgical History:   Procedure Laterality Date    EPIDURAL STEROID INJECTION INTO THORACIC SPINE N/A 7/22/2025    Procedure: Injection-steroid-epidural-thoracic;  Surgeon: Manpreet Aguilar MD;  Location: [facility];  Service: Pain Management;  Laterality: N/A;    HERNIA REPAIR      INGUNAL     KNEE ARTHROPLASTY Left 8/25/2020    Procedure: ARTHROPLASTY, KNEE;  Surgeon: Max Garcia MD;  Location: [facility] OR;  Service: Orthopedics;  Laterality: Left;    TRANSRECTAL ULTRASOUND EXAMINATION N/A 8/12/2019    Procedure: TRANSRECTAL ULTRASOUND;  Surgeon: Latia Yoon MD;  Location: [facility] OR;  Service: Urology;  Laterality: N/A;    VASECTOMY      WISDOM TOOTH EXTRACTION       Family History   Problem Relation Name Age of Onset    Dementia Mother      Cancer Father          skin    Heart disease Father      Allergic rhinitis Neg Hx      Allergies Neg Hx      Angioedema Neg Hx      Asthma Neg Hx      Atopy Neg Hx      Eczema Neg Hx      Immunodeficiency Neg Hx      Rhinitis Neg Hx      Urticaria Neg Hx    "    Vitals:    25 1111   Weight: 106 kg (233 lb 11 oz)   Height: 5' 8" (1.727 m)       Review of Systems   Constitutional:  Negative for chills, diaphoresis, fatigue, fever and unexpected weight change.   HENT:  Negative for trouble swallowing.    Eyes:  Negative for visual disturbance.   Respiratory:  Negative for shortness of breath.    Cardiovascular:  Negative for chest pain.   Gastrointestinal:  Negative for abdominal pain, constipation, diarrhea, nausea and vomiting.   Genitourinary:  Negative for difficulty urinating.   Musculoskeletal:  Negative for arthralgias, back pain, gait problem, joint swelling, myalgias, neck pain and neck stiffness.   Neurological:  Negative for dizziness, speech difficulty, weakness, light-headedness, numbness and headaches.          Objective:      Physical Exam  Neurological:      Mental Status: He is alert and oriented to person, place, and time.             Assessment:       1. Cervical radiculitis    2. Cervicalgia           Plan:     Improved to his satisfaction status post interlaminar injection C7-T1.  We can repeat that procedure as needed.  Follow up here as needed.  Activity as tolerated      Cervical radiculitis    Cervicalgia               [1]   Social History  Socioeconomic History    Marital status:    Tobacco Use    Smoking status: Former     Current packs/day: 0.00     Average packs/day: 1 pack/day for 10.0 years (10.0 ttl pk-yrs)     Types: Cigarettes     Start date: 4/3/1964     Quit date: 4/3/1977     Years since quittin.3    Smokeless tobacco: Never   Substance and Sexual Activity    Alcohol use: No    Drug use: No     Social Drivers of Health     Financial Resource Strain: Low Risk  (2024)    Overall Financial Resource Strain (CARDIA)     Difficulty of Paying Living Expenses: Not hard at all   Food Insecurity: No Food Insecurity (2024)    Hunger Vital Sign     Worried About Running Out of Food in the Last Year: Never true     Ran Out " of Food in the Last Year: Never true   Transportation Needs: No Transportation Needs (4/8/2024)    PRAPARE - Transportation     Lack of Transportation (Medical): No     Lack of Transportation (Non-Medical): No   Physical Activity: Sufficiently Active (2/17/2025)    Exercise Vital Sign     Days of Exercise per Week: 7 days     Minutes of Exercise per Session: 60 min   Stress: No Stress Concern Present (4/8/2024)    [employer] [place] [employer] - Occupational Stress Questionnaire     Feeling of Stress : Not at all   Housing Stability: Low Risk  (4/8/2024)    Housing Stability Vital Sign     Unable to Pay for Housing in the Last Year: No     Number of Places Lived in the Last Year: 1     Unstable Housing in the Last Year: No

## (undated) DEVICE — SLEEVE SCD EXPRESS CALF MEDIUM

## (undated) DEVICE — SEE ITEM #152308

## (undated) DEVICE — LINER SUCTION 3000CC

## (undated) DEVICE — COVER TRANSDUCER LATEX N/STERI

## (undated) DEVICE — CHLORAPREP 10.5 ML APPLICATOR

## (undated) DEVICE — SEE MEDLINE ITEM 146231

## (undated) DEVICE — MANIFOLD 4 PORT

## (undated) DEVICE — SYR GLASS 5CC LUER LOK

## (undated) DEVICE — DRESSING AQUACEL AG ADV 3.5X12

## (undated) DEVICE — SUT 2/0 18IN COATED VICRYL

## (undated) DEVICE — CUBE COLD THERAPY POLAR CARE

## (undated) DEVICE — SPONGE LAP 18X18 PREWASHED

## (undated) DEVICE — GLOVE SURG ULTRA TOUCH 8

## (undated) DEVICE — BLADE SAG DUAL 18MMX1.27MMX90M

## (undated) DEVICE — BLADE SURG CARBON STEEL SZ11

## (undated) DEVICE — SEE MEDLINE ITEM 152651

## (undated) DEVICE — GLOVE SURG ULTRA TOUCH 7

## (undated) DEVICE — SOL IRR NACL .9% 3000ML

## (undated) DEVICE — PACK CUSTOM UNIV BASIN SLI

## (undated) DEVICE — GLOVE SENSICARE PI GRN 7.5

## (undated) DEVICE — ALCOHOL 70% ISOP RUBBING 4OZ

## (undated) DEVICE — DRAPE STERI INSTRUMENT 1018

## (undated) DEVICE — DRESSING AQUACEL AG 3.5X10IN

## (undated) DEVICE — TOURNIQUET SB QC DP 34X4IN

## (undated) DEVICE — SCRUB 10% POVIDONE IODINE 4OZ

## (undated) DEVICE — INTERPULSE SET

## (undated) DEVICE — SUT STRATAFIX PDS 2 CT-1 14IN

## (undated) DEVICE — SEE MEDLINE ITEM 152622

## (undated) DEVICE — SYS CLSR DERMABOND PRINEO 22CM

## (undated) DEVICE — SET DECANTER MEDICHOICE

## (undated) DEVICE — TUBE SUCTION YANKAUER HI CAP

## (undated) DEVICE — NDL SPINAL 18GX3.5 SPINOCAN

## (undated) DEVICE — TUBING MINIBORE EXTENSION

## (undated) DEVICE — BLADE SURG CARBON STEEL #10

## (undated) DEVICE — SEE MEDLINE ITEM 157166

## (undated) DEVICE — DRAPE INCISE IOBAN 2 23X17IN

## (undated) DEVICE — SUT STRATAFIX PDS 1 CTX 18IN

## (undated) DEVICE — ELECTRODE REM PLYHSV RETURN 9

## (undated) DEVICE — CONTAINER SPECIMEN STRL 4OZ

## (undated) DEVICE — SHEET DRAPE MEDIUM

## (undated) DEVICE — NDL TUOHY EPIDURAL 20G X 3.5

## (undated) DEVICE — BLADE SAW SGTL DL STR 25X1.27X

## (undated) DEVICE — APPLICATOR CHLORAPREP ORN 26ML

## (undated) DEVICE — SPONGE SUPER KERLIX 6X6.75IN

## (undated) DEVICE — PACK LOWER EXTREMITY

## (undated) DEVICE — STRAP OR TABLE 5IN X 72IN

## (undated) DEVICE — GLOVE SURG ULTRA TOUCH 7.5

## (undated) DEVICE — GUIDE BIOPSY BIPLANAR 18G

## (undated) DEVICE — UNDERGLOVES BIOGEL PI SIZE 7.5

## (undated) DEVICE — WRAP PROTECTIVE LEG POS STRL

## (undated) DEVICE — TOGA FLYTE PEEL AWAY XLARGE

## (undated) DEVICE — PACK BASIC

## (undated) DEVICE — PIN PINABALL HEADLESS
Type: IMPLANTABLE DEVICE | Site: KNEE | Status: NON-FUNCTIONAL
Removed: 2020-08-25

## (undated) DEVICE — SOL 9P NACL IRR PIC IL

## (undated) DEVICE — DRAPE STERI U-SHAPED 47X51IN

## (undated) DEVICE — GOWN TOGA SYS PEELWY ZIP 2 XL

## (undated) DEVICE — BANDAGE ESMARK 6X12

## (undated) DEVICE — PAD ABD 8X10 STERILE

## (undated) DEVICE — SYR 50CC LL

## (undated) DEVICE — PADDING CAST SPECIALIST 6X4YD

## (undated) DEVICE — SEE MEDLINE ITEM 152530

## (undated) DEVICE — GLOVE SURG ULTRA TOUCH 6

## (undated) DEVICE — PAD CAST SPECIALIST STRL 6

## (undated) DEVICE — SYS LABEL CORRECT MED

## (undated) DEVICE — SEE MEDLINE ITEM 157131

## (undated) DEVICE — JELLY KY LUBRICATING 5G PACKET

## (undated) DEVICE — SEE MEDLINE ITEM 107746

## (undated) DEVICE — WATER STERILE INJ 500ML BAG

## (undated) DEVICE — SET CYSTO IRRIGATION UNIV SPIK